# Patient Record
Sex: MALE | Race: WHITE | NOT HISPANIC OR LATINO | Employment: FULL TIME | URBAN - METROPOLITAN AREA
[De-identification: names, ages, dates, MRNs, and addresses within clinical notes are randomized per-mention and may not be internally consistent; named-entity substitution may affect disease eponyms.]

---
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
## 2018-03-17 ENCOUNTER — HOSPITAL ENCOUNTER (EMERGENCY)
Facility: CLINIC | Age: 18
Discharge: HOME | End: 2018-03-17
Attending: PHYSICIAN ASSISTANT | Admitting: PHYSICIAN ASSISTANT
Payer: COMMERCIAL

## 2018-03-17 VITALS
WEIGHT: 150 LBS | RESPIRATION RATE: 18 BRPM | DIASTOLIC BLOOD PRESSURE: 72 MMHG | BODY MASS INDEX: 21.22 KG/M2 | SYSTOLIC BLOOD PRESSURE: 123 MMHG | TEMPERATURE: 97.6 F | OXYGEN SATURATION: 100 %

## 2018-03-17 DIAGNOSIS — R51.9 NONINTRACTABLE HEADACHE, UNSPECIFIED CHRONICITY PATTERN, UNSPECIFIED HEADACHE TYPE: ICD-10-CM

## 2018-03-17 DIAGNOSIS — M25.50 MULTIPLE JOINT PAIN: ICD-10-CM

## 2018-03-17 DIAGNOSIS — B02.9 HERPES ZOSTER WITHOUT COMPLICATION: ICD-10-CM

## 2018-03-17 PROCEDURE — 86666 EHRLICHIA ANTIBODY: CPT | Performed by: PHYSICIAN ASSISTANT

## 2018-03-17 PROCEDURE — 86618 LYME DISEASE ANTIBODY: CPT | Performed by: PHYSICIAN ASSISTANT

## 2018-03-17 PROCEDURE — G0463 HOSPITAL OUTPT CLINIC VISIT: HCPCS

## 2018-03-17 PROCEDURE — 36415 COLL VENOUS BLD VENIPUNCTURE: CPT

## 2018-03-17 PROCEDURE — 99213 OFFICE O/P EST LOW 20 MIN: CPT | Performed by: PHYSICIAN ASSISTANT

## 2018-03-17 RX ORDER — VALACYCLOVIR HYDROCHLORIDE 1 G/1
1000 TABLET, FILM COATED ORAL 3 TIMES DAILY
Qty: 21 TABLET | Refills: 0 | Status: SHIPPED | OUTPATIENT
Start: 2018-03-17 | End: 2018-07-25

## 2018-03-17 NOTE — ED PROVIDER NOTES
Chief Complaint:     Chief Complaint   Patient presents with     Rash     rash on trunk of body with hot/cold sweats       HPI: Carlyle Archibald is a 17 year old male who presents with a rash on the upper back and trunk of body. The rash was first noticed  2 days ago, and has spread. The rash is itchy with a burning prickly sensation.       Associated Symptoms:headache and joint pains.  The headache comes and goes.    He denies any fever.  This is not the worse headache of his life.  No neck pain.  No nausea or vomiting.  No abdominal pain or diarrhea.  No muscle weakness.    ROS:  Further problem focused system review was otherwise negative.     Current Meds  Current Outpatient Prescriptions   Medication Sig Dispense Refill     FLUoxetine (PROZAC) 10 MG capsule Take 1 capsule (10 mg) by mouth 2 times daily 60 capsule 0     Acetaminophen (TYLENOL PO) Take 1,000 mg by mouth every 6 hours as needed for mild pain or fever       adalimumab (HUMIRA) 40 MG/0.8ML prefilled syringe kit Inject 40 mg Subcutaneous every 14 days          Allergies:  No Known Allergies     Immunizations  Immunization History   Administered Date(s) Administered     DTAP (<7y) 2000, 2000, 02/25/1999, 09/04/2003     HEPA 09/08/2006, 08/28/2007     HPV 09/10/2012, 12/16/2014, 03/31/2015     HepB 2000, 2000, 05/28/1999     Hib (PRP-T) 2000, 2000, 02/25/1999     Influenza (IIV3) PF 10/20/1999, 11/29/1999, 10/07/2002, 10/30/2003, 11/06/2006, 11/05/2007, 11/03/2008, 10/27/2011, 09/10/2012     Influenza Vaccine IM 3yrs+ 4 Valent IIV4 12/31/2013, 12/16/2014, 02/15/2016, 11/08/2016     MMR 08/27/1999, 09/04/2003, 03/28/2014     Meningococcal (Menactra ) 08/31/2009, 12/16/2014     Poliovirus, inactivated (IPV) 2000, 2000, 08/27/1999, 09/04/2003     TDAP Vaccine (Boostrix) 08/31/2009     TRIHIBIT (DTAP/HIB, <7y) 11/29/1999     Varicella 08/27/1999     Varicella Pt Report Hx of Varicella/Chicken Pox 10/01/2002            OBJECTIVE:     Vital signs reviewed by Leonides Padilla  /72  Temp 97.6  F (36.4  C) (Temporal)  Resp 18  Wt 68 kg (150 lb)  SpO2 100%  BMI 21.22 kg/m2     PEFR:  General appearance: healthy, alert and no distress  Skin: vesicles and macules on an erythematous base in a dermatomal distribution of the upper chest, side and back on the R side.  He also has a 4cm by 4 cm in diameter area of light erythema with a very mild central lightening in the R axillary area.  Ears: R TM - normal: no effusions, no erythema, and normal landmarks, L TM - normal: no effusions, no erythema, and normal landmarks  Eyes: R normal, L normal  Nose: normal  Oropharynx: normal  Neck: supple and no adenopathy  Lungs: normal and clear to auscultation  Heart: S1, S2 normal, no murmur, click, rub or gallop, regular rate and rhythm  Abdomen: Abdomen soft, non-tender without masses or organomegaly  Neuro: negative findings: speech normal, mental status intact, cranial nerves 2-12 intact, gait, including heel, toe, and tandem walking normal, muscle tone normal, muscle strength normal, rapid alternating movements normal, finger to nose normal, reflexes normal and symmetric      ASSESSMENT:     (B02.9) Herpes zoster without complication  Plan: valACYclovir (VALTREX) 1000 mg tablet    (R51) Nonintractable headache, unspecified chronicity pattern, unspecified headache type    (M25.50) Multiple joint pain       PLAN:    Rx for Valtrex sent in.  With symptoms, I am concerned for tick born illness also.  Lab work for this ordered today.  Instructed patient to follow up with his PCP this week for lab results, and re-evaluation.  Patient verbalized understanding and agreed with this plan.     Leonides Marsh PA-C  03/17/18 [id number]

## 2018-03-17 NOTE — ED AVS SNAPSHOT
[facility] Emergency Department    [address] [address]    Phone:  [phone]    Fax:  [phone]                                       Carlyle Archibald   MRN: [id number]    Department:  [facility] Emergency Department   Date of Visit:  3/17/2018           Patient Information     Date Of Birth          2000        Your diagnoses for this visit were:     Herpes zoster without complication     Nonintractable headache, unspecified chronicity pattern, unspecified headache type     Multiple joint pain        You were seen by Leonides Padilla PA-C.        Discharge Instructions       Please follow up with your regular doctor next week for lab results.      Discharge References/Attachments     SHINGLES (HERPES ZOSTER) (ENGLISH)      24 Hour Appointment Hotline       To make an appointment at any [facility] clinic, call [phone]). If you don't have a family doctor or clinic, we will help you find one. [facility] clinics are conveniently located to serve the needs of you and your family.             Review of your medicines      START taking        Dose / Directions Last dose taken    valACYclovir 1000 mg tablet   Commonly known as:  VALTREX   Dose:  1000 mg   Quantity:  21 tablet        Take 1 tablet (1,000 mg) by mouth 3 times daily for 7 days   Refills:  0          Our records show that you are taking the medicines listed below. If these are incorrect, please call your family doctor or clinic.        Dose / Directions Last dose taken    adalimumab 40 MG/0.8ML prefilled syringe kit   Commonly known as:  HUMIRA   Dose:  40 mg        Inject 40 mg Subcutaneous every 14 days   Refills:  0        FLUoxetine 10 MG capsule   Commonly known as:  PROzac   Dose:  10 mg   Quantity:  60 capsule        Take 1 capsule (10 mg) by mouth 2 times daily   Refills:  0        TYLENOL PO   Dose:  1000 mg        Take 1,000 mg by mouth every 6 hours as needed for mild pain or fever    Refills:  0                Prescriptions were sent or printed at these locations (1 Prescription)                   [facility] - [facility] [place] - [address] [address]   [address] [address] [address]    Telephone:  [phone]   Fax:  [phone]   Hours:                  E-Prescribed (1 of 1)         valACYclovir (VALTREX) 1000 mg tablet                Procedures and tests performed during your visit     Anaplasma phagocytoph antibody IgG IgM    Lyme Disease Marya with reflex to WB Serum      Orders Needing Specimen Collection     None      Pending Results     No orders found from 3/15/2018 to 3/18/2018.            Pending Culture Results     No orders found from 3/15/2018 to 3/18/2018.            Pending Results Instructions     If you had any lab results that were not finalized at the time of your Discharge, you can call the ED Lab Result RN at [phone]. You will be contacted by this team for any positive Lab results or changes in treatment. The nurses are available 7 days a week from 10A to 6:30P.  You can leave a message 24 hours per day and they will return your call.        Test Results From Your Hospital Stay               Thank you for choosing [facility]       Thank you for choosing [facility] for your care. Our goal is always to provide you with excellent care. Hearing back from our patients is one way we can continue to improve our services. Please take a few minutes to complete the written survey that you may receive in the mail after you visit with us. Thank you!        [employer] Information     [employer] gives you secure access to your electronic health record. If you see a primary care provider, you can also send messages to your care team and make appointments. If you have questions, please call your primary care clinic.  If you do not have a primary care provider, please call [phone] and they will assist you.        Care EveryWhere ID     This is your Care EveryWhere ID. This  could be used by other organizations to access your [facility] medical records  [id number]        Equal Access to Services     GARRET SALINAS : Hadii garry Castellano, marvin dawson, tra krishnamurthy. So [facility] [phone].    ATENCIÓN: Si habla español, tiene a rivas disposición servicios gratuitos de asistencia lingüística. Llame al [phone].    We comply with applicable federal civil rights laws and [place] laws. We do not discriminate on the basis of race, color, national origin, age, disability, sex, sexual orientation, or gender identity.            After Visit Summary       This is your record. Keep this with you and show to your community pharmacist(s) and doctor(s) at your next visit.

## 2018-03-17 NOTE — ED AVS SNAPSHOT
[facility] Emergency Department    [address] [address]    Phone:  [phone]    Fax:  [phone]                                       Carlyle Archibald   MRN: [id number]    Department:  [facility] Emergency Department   Date of Visit:  3/17/2018           After Visit Summary Signature Page     I have received my discharge instructions, and my questions have been answered. I have discussed any challenges I see with this plan with the nurse or doctor.    ..........................................................................................................................................  Patient/Patient Representative Signature      ..........................................................................................................................................  Patient Representative Print Name and Relationship to Patient    ..................................................               ................................................  Date                                            Time    ..........................................................................................................................................  Reviewed by Signature/Title    ...................................................              ..............................................  Date                                                            Time

## 2018-03-18 ENCOUNTER — HOSPITAL ENCOUNTER (EMERGENCY)
Facility: CLINIC | Age: 18
Discharge: HOME | End: 2018-03-18
Attending: FAMILY MEDICINE | Admitting: FAMILY MEDICINE
Payer: COMMERCIAL

## 2018-03-18 VITALS
OXYGEN SATURATION: 98 % | SYSTOLIC BLOOD PRESSURE: 131 MMHG | HEIGHT: 73 IN | BODY MASS INDEX: 19.88 KG/M2 | RESPIRATION RATE: 16 BRPM | WEIGHT: 150 LBS | TEMPERATURE: 97.6 F | DIASTOLIC BLOOD PRESSURE: 82 MMHG

## 2018-03-18 DIAGNOSIS — B02.9 HERPES ZOSTER WITHOUT COMPLICATION: ICD-10-CM

## 2018-03-18 LAB — B BURGDOR IGG+IGM SER QL: 0.03 (ref 0–0.89)

## 2018-03-18 PROCEDURE — 99284 EMERGENCY DEPT VISIT MOD MDM: CPT | Mod: Z6 | Performed by: FAMILY MEDICINE

## 2018-03-18 PROCEDURE — 99282 EMERGENCY DEPT VISIT SF MDM: CPT | Performed by: FAMILY MEDICINE

## 2018-03-18 RX ORDER — HYDROCODONE BITARTRATE AND ACETAMINOPHEN 5; 325 MG/1; MG/1
2 TABLET ORAL ONCE
Status: DISCONTINUED | OUTPATIENT
Start: 2018-03-18 | End: 2018-03-18 | Stop reason: HOSPADM

## 2018-03-18 RX ORDER — HYDROCODONE BITARTRATE AND ACETAMINOPHEN 5; 325 MG/1; MG/1
1-2 TABLET ORAL EVERY 4 HOURS PRN
Qty: 30 TABLET | Refills: 0 | Status: SHIPPED | OUTPATIENT
Start: 2018-03-18 | End: 2018-07-25

## 2018-03-18 RX ORDER — HYDROCODONE BITARTRATE AND ACETAMINOPHEN 5; 325 MG/1; MG/1
TABLET ORAL
Status: DISCONTINUED
Start: 2018-03-18 | End: 2018-03-18 | Stop reason: HOSPADM

## 2018-03-18 NOTE — ED AVS SNAPSHOT
[facility] Emergency Department    [address] [address]    Phone:  [phone]    Fax:  [phone]                                       Carlyle Archibald   MRN: [id number]    Department:  [facility] Emergency Department   Date of Visit:  3/18/2018           After Visit Summary Signature Page     I have received my discharge instructions, and my questions have been answered. I have discussed any challenges I see with this plan with the nurse or doctor.    ..........................................................................................................................................  Patient/Patient Representative Signature      ..........................................................................................................................................  Patient Representative Print Name and Relationship to Patient    ..................................................               ................................................  Date                                            Time    ..........................................................................................................................................  Reviewed by Signature/Title    ...................................................              ..............................................  Date                                                            Time

## 2018-03-18 NOTE — ED AVS SNAPSHOT
[facility] Emergency Department    [address] [address]    Phone:  [phone]    Fax:  [phone]                                       Carlyle Archibald   MRN: [id number]    Department:  [facility] Emergency Department   Date of Visit:  3/18/2018           Patient Information     Date Of Birth          2000        Your diagnoses for this visit were:     Herpes zoster without complication        You were seen by Cliff Goff MD.        Discharge Instructions       Do not take your next Humira injection until you talk to her gastroenterologist.  You should probably hold off on this until the shingles is clearly improving.  You need to return to the emergency department if the rash starts to show up in other areas of your body or if you develop fevers, severe headache, or other new concerning symptoms.  Use ibuprofen 400-600 mg up to 4 times per day if needed for pain. Stop if it is causing nausea or abdominal pain.   Add Norco 5-325 (hydrocodone-acetaminophen) 1-2 pills up to every 4 hours if needed for pain.  Try to use this only at night to help with sleep.  Do not use alcohol, operate machinery, drive, or climb on ladders for 8 hours after taking Norco. Use docusate (100mg) 2 times a day to prevent constipation while on narcotics.      24 Hour Appointment Hotline       To make an appointment at any [facility] clinic, call [phone]). If you don't have a family doctor or clinic, we will help you find one. [facility] clinics are conveniently located to serve the needs of you and your family.             Review of your medicines      START taking        Dose / Directions Last dose taken    HYDROcodone-acetaminophen 5-325 MG per tablet   Commonly known as:  NORCO   Dose:  1-2 tablet   Quantity:  30 tablet        Take 1-2 tablets by mouth every 4 hours as needed for moderate to severe pain   Refills:  0          Our records show that you are taking the  medicines listed below. If these are incorrect, please call your family doctor or clinic.        Dose / Directions Last dose taken    adalimumab 40 MG/0.8ML prefilled syringe kit   Commonly known as:  HUMIRA   Dose:  40 mg        Inject 40 mg Subcutaneous every 14 days   Refills:  0        TYLENOL PO   Dose:  1000 mg        Take 1,000 mg by mouth every 6 hours as needed for mild pain or fever   Refills:  0        valACYclovir 1000 mg tablet   Commonly known as:  VALTREX   Dose:  1000 mg   Quantity:  21 tablet        Take 1 tablet (1,000 mg) by mouth 3 times daily for 7 days   Refills:  0                Prescriptions were sent or printed at these locations (1 Prescription)                   Other Prescriptions                Printed at Department/Unit printer (1 of 1)         HYDROcodone-acetaminophen (NORCO) 5-325 MG per tablet                Orders Needing Specimen Collection     None      Pending Results     Date and Time Order Name Status Description    3/17/2018 1306 Anaplasma phagocytoph antibody IgG IgM In process     3/17/2018 1306 Lyme Disease Marya with reflex to WB Serum In process             Pending Culture Results     No orders found from 3/16/2018 to 3/19/2018.            Pending Results Instructions     If you had any lab results that were not finalized at the time of your Discharge, you can call the ED Lab Result RN at [phone]. You will be contacted by this team for any positive Lab results or changes in treatment. The nurses are available 7 days a week from 10A to 6:30P.  You can leave a message 24 hours per day and they will return your call.        Test Results From Your Hospital Stay               Thank you for choosing [facility]       Thank you for choosing [facility] for your care. Our goal is always to provide you with excellent care. Hearing back from our patients is one way we can continue to improve our services. Please take a few minutes to complete the written survey that you may receive  in the mail after you visit with us. Thank you!        [employer] Information     [employer] gives you secure access to your electronic health record. If you see a primary care provider, you can also send messages to your care team and make appointments. If you have questions, please call your primary care clinic.  If you do not have a primary care provider, please call [phone] and they will assist you.        Care EveryWhere ID     This is your Care EveryWhere ID. This could be used by other organizations to access your [facility] medical records  [id number]        Equal Access to Services     GARRET [facility] : Asia Castellano, marvin dawson, nathan oliver, tra garcia . So [facility] [phone].    ATENCIÓN: Si habla español, tiene a rivas disposición servicios gratuitos de asistencia lingüística. Llame al [phone].    We comply with applicable federal civil rights laws and [place] laws. We do not discriminate on the basis of race, color, national origin, age, disability, sex, sexual orientation, or gender identity.            After Visit Summary       This is your record. Keep this with you and show to your community pharmacist(s) and doctor(s) at your next visit.

## 2018-03-18 NOTE — DISCHARGE INSTRUCTIONS
Do not take your next Humira injection until you talk to her gastroenterologist.  You should probably hold off on this until the shingles is clearly improving.  You need to return to the emergency department if the rash starts to show up in other areas of your body or if you develop fevers, severe headache, or other new concerning symptoms.  Use ibuprofen 400-600 mg up to 4 times per day if needed for pain. Stop if it is causing nausea or abdominal pain.   Add Norco 5-325 (hydrocodone-acetaminophen) 1-2 pills up to every 4 hours if needed for pain.  Try to use this only at night to help with sleep.  Do not use alcohol, operate machinery, drive, or climb on ladders for 8 hours after taking Norco. Use docusate (100mg) 2 times a day to prevent constipation while on narcotics.

## 2018-03-18 NOTE — ED PROVIDER NOTES
History     Chief Complaint   Patient presents with     Shingles     Rt thorax and Rt back     HPI  Carlyle Archibald is a 17 year old male who presents with a rash on the right side of his trunk.  He was seen in urgent care today and diagnosed with shingles.  He comes into now because he has pain with his ability to sleep.  He is treated with Humira for Crohn's disease.  He is due for his next injection on Tuesday in 3 days.  He is not having any new lesions develop outside of the original area of involvement and denies headache, fever, mental confusion, or other new concerning symptoms.    Problem List:    Patient Active Problem List    Diagnosis Date Noted     Anxiety 03/27/2017     Priority: Medium     Substance abuse 11/08/2016     Priority: Medium     Major depressive disorder, single episode, mild (H) 11/08/2016     Priority: Medium     Perianal abscess 02/04/2014     Priority: Medium     Crohn's disease (H) 01/25/2014     Priority: Medium     - Diagnosed: 1/2014, at age: 13 years     - Location (visual):    Ileocolonic (L3)                                                                 Unknown (EGD was not performed):                                         Upper GI disease proximal to ligament of Treitz (L4a)                               Upper GI disease distal to ligament of Treitz  and proximal to distal 1/3 ileum (L4b)    - Extent (histopath): TI, Entire Colon    - Behavior:     Non-stricturing, non-penetrating (B1)                            Perianal (p)    - Growth:       No evidence of growth delay (G0)                            - TPMT phenotype:     Normal 27.1  [id number]  - IBD7:                            Not done    - PPD status:      2/2014: pending    - Extraintestinal manifestations: None (erythema nodosum, pyoderma gangrenosum, arthritis, arthralgia, fevers, PSC, others.)    - Previous IBD-related medications and reasons for their discontinuation:    - Immunization status: Fully Immunized  for age  - Varicella, measles, mumps IgG status: Unknown, Positive titers: 2/2014: pending    - Eye exam:                date and result    - Previous abdominal surgeries: dates  - Previous admissions for IBD: dates       GERD (gastroesophageal reflux disease) 01/07/2014     Priority: Medium     Osgood-Schlatter's disease 09/10/2012     Priority: Medium     Left knee       Personal history of disease 09/08/2006     Priority: Medium     Problem list name updated by automated process. Provider to review          Past Medical History:    Past Medical History:   Diagnosis Date     Closed fracture of unspecified part of humerus 8/2005     GN IgG mesangial       Meningitis, unspecified(322.9)        Past Surgical History:    Past Surgical History:   Procedure Laterality Date     COLONOSCOPY  1/17/2014    Procedure: COMBINED COLONOSCOPY, SINGLE BIOPSY/POLYPECTOMY BY BIOPSY;  Colonoscopy;  Surgeon: Una Alcazar MD;  Location: [facility]     TONSILLECTOMY & ADENOIDECTOMY  about 1 1/3 yo       Family History:    Family History   Problem Relation Age of Onset     Allergies Mother      Lipids Mother      Allergies Father      GASTROINTESTINAL DISEASE Father      Crohn's disease     Lipids Maternal Grandmother      CANCER Maternal Grandmother      Lipids Maternal Grandfather      CANCER Maternal Grandfather      DIABETES Paternal Grandfather      CANCER Paternal Grandmother      Anxiety Disorder Sister        Social History:  Marital Status:  Single [1]  Social History   Substance Use Topics     Smoking status: Never Smoker     Smokeless tobacco: Never Used     Alcohol use No        Medications:      HYDROcodone-acetaminophen (NORCO) 5-325 MG per tablet   valACYclovir (VALTREX) 1000 mg tablet   adalimumab (HUMIRA) 40 MG/0.8ML prefilled syringe kit   Acetaminophen (TYLENOL PO)         Review of Systems  Further problem focused system review negative.    Physical Exam   BP: 131/82  Heart Rate: 64  Temp: 97.6  F (36.4  " C)  Resp: 16  Height: 185.4 cm (6' 1\")  Weight: 68 kg (150 lb)  SpO2: 98 %      Physical Exam  Nursing note and vitals were reviewed.  Constitutional: Awake and alert, adequately nourished and developed appearing 17-year-old in moderate discomfort, who does not appear acutely ill, and who answers questions appropriately and cooperates with examination.    Cardiovascular: Cardiac examination reveals normal heart rate and regular rhythm without murmur.  Pulmonary/Chest: Breathing is unlabored.    Musculoskeletal: Extremities are warm and well-perfused and without edema  Neurological: Alert, oriented, thought content logical, coherent   Skin: Warm, dry, there is a vesicular rash in a dermatomal distribution on the right trunk which was photographed as below.  This is typical of zoster.  No other lesions outside of this area.  Psychiatric: Affect broad and appropriate.                  ED Course     ED Course     Procedures               Critical Care time:  none               No results found for this or any previous visit (from the past 24 hour(s)).    Medications - No data to display    Assessments & Plan (with Medical Decision Making)     17-year-old male presents with shingles.  Symptoms have been present for 3 days.  He was seen earlier today and prior prescribed Valtrex which he is taking.  He reports returns because of poor pain control.  I have advised use of a combination of acetaminophen, ibuprofen, and hydrocodone for breakthrough pain.  I also discussed with him my concern for his use of Humira with this infection that he could develop disseminated zoster.  I recommended he not take his next injection and call and speak to his gastroenterologist.  I think he should avoid using this until he is clearly showing evidence of recovery.  I also discussed with him that if he starts to show evidence of lesions showing up in other areas of his body or systemic illness he needs to return to the emergency department " immediately.    I have reviewed the nursing notes.    I have reviewed the findings, diagnosis, plan and need for follow up with the patient.       Discharge Medication List as of 3/18/2018 12:58 AM      START taking these medications    Details   HYDROcodone-acetaminophen (NORCO) 5-325 MG per tablet Take 1-2 tablets by mouth every 4 hours as needed for moderate to severe pain, Disp-30 tablet, R-0, Local Print             Final diagnoses:   Herpes zoster without complication       3/18/2018   [facility] EMERGENCY DEPARTMENT     Cliff Goff MD  03/18/18 1716

## 2018-03-18 NOTE — ED NOTES
Pt states that he dwveloped  Blisters on his rt chest and rt mid back area 2 days a ago and it has increased in size and pain since then. He has had chicken pox. He also has Crohns disease which he is on Humira for.

## 2018-03-20 LAB
A PHAGOCYTOPH IGG TITR SER IF: NORMAL {TITER}
A PHAGOCYTOPH IGM TITR SER IF: NORMAL {TITER}

## 2018-06-15 ENCOUNTER — TRANSFERRED RECORDS (OUTPATIENT)
Dept: HEALTH INFORMATION MANAGEMENT | Facility: CLINIC | Age: 18
End: 2018-06-15

## 2018-07-25 ENCOUNTER — OFFICE VISIT (OUTPATIENT)
Dept: FAMILY MEDICINE | Facility: CLINIC | Age: 18
End: 2018-07-25
Payer: COMMERCIAL

## 2018-07-25 VITALS
HEIGHT: 73 IN | HEART RATE: 72 BPM | SYSTOLIC BLOOD PRESSURE: 110 MMHG | BODY MASS INDEX: 18.82 KG/M2 | OXYGEN SATURATION: 99 % | WEIGHT: 142 LBS | DIASTOLIC BLOOD PRESSURE: 78 MMHG | TEMPERATURE: 97 F

## 2018-07-25 DIAGNOSIS — K50.918 CROHN'S DISEASE WITH OTHER COMPLICATION, UNSPECIFIED GASTROINTESTINAL TRACT LOCATION (H): Primary | ICD-10-CM

## 2018-07-25 DIAGNOSIS — F19.10 SUBSTANCE ABUSE (H): ICD-10-CM

## 2018-07-25 DIAGNOSIS — F41.1 GAD (GENERALIZED ANXIETY DISORDER): ICD-10-CM

## 2018-07-25 DIAGNOSIS — F32.0 MAJOR DEPRESSIVE DISORDER, SINGLE EPISODE, MILD (H): ICD-10-CM

## 2018-07-25 PROCEDURE — 99215 OFFICE O/P EST HI 40 MIN: CPT | Performed by: NURSE PRACTITIONER

## 2018-07-25 ASSESSMENT — ANXIETY QUESTIONNAIRES
7. FEELING AFRAID AS IF SOMETHING AWFUL MIGHT HAPPEN: NEARLY EVERY DAY
IF YOU CHECKED OFF ANY PROBLEMS ON THIS QUESTIONNAIRE, HOW DIFFICULT HAVE THESE PROBLEMS MADE IT FOR YOU TO DO YOUR WORK, TAKE CARE OF THINGS AT HOME, OR GET ALONG WITH OTHER PEOPLE: VERY DIFFICULT
2. NOT BEING ABLE TO STOP OR CONTROL WORRYING: NEARLY EVERY DAY
3. WORRYING TOO MUCH ABOUT DIFFERENT THINGS: NEARLY EVERY DAY
5. BEING SO RESTLESS THAT IT IS HARD TO SIT STILL: SEVERAL DAYS
6. BECOMING EASILY ANNOYED OR IRRITABLE: NEARLY EVERY DAY
GAD7 TOTAL SCORE: 17
1. FEELING NERVOUS, ANXIOUS, OR ON EDGE: MORE THAN HALF THE DAYS

## 2018-07-25 ASSESSMENT — PATIENT HEALTH QUESTIONNAIRE - PHQ9: 5. POOR APPETITE OR OVEREATING: MORE THAN HALF THE DAYS

## 2018-07-25 NOTE — MR AVS SNAPSHOT
After Visit Summary   7/25/2018    Carlyle Archibald    MRN: [id number]           Patient Information     Date Of Birth          2000        Visit Information        Provider Department      7/25/2018 8:40 AM Smiley Rivera NP [facility]        Today's Diagnoses     Crohn's disease with other complication, unspecified gastrointestinal tract location (H)    -  1    Major depressive disorder, single episode, mild (H)        Substance abuse        CLARITZA (generalized anxiety disorder)          Care Instructions    Possible side effects of antidepressants/anxiety meds, including but not limited to GI upset, disrupted sleep, loss of libido, worsening of mood or even possible risk of increased suicidal thoughts.   Often some of these things if not severe will improve after 1-2 weeks on medications but some may not see effects for 3-4 weeks,  if tolerable patients should continue meds and see if there is improvement.  If symptoms are intolerable or for any suicidal thoughts the medication should be stopped immediately and contact the clinic.      These medications should be used for 6-9 months before stopping, to avoid rebound symptoms.   Contact the clinic if having any problems tolerating these medications.  Take the medication daily and do not stop the medication abruptly.    Follow up in 2-3 weeks either e-visit, telephone visit or in clinic to discuss improvement and whether or not we need to change meds or increase dose.  Follow up sooner if problems.    Referral to Psychiatry - they will call you to make appointment.    Please plan to contact the clinic or 24 hour nurse line at any time if you are having any thoughts of self harm.    GUNNAR Reilly              Follow-ups after your visit        Additional Services     MENTAL HEALTH REFERRAL  - Adult; Psychiatry and Medication Management; Psychiatry; [facility] [place]: Collaborative Care Psychiatry Service [phone].  Medication  management & future refills will be returned to [facility] PCP upon completion of evaluation; We jose...       All scheduling is subject to the client's specific insurance plan & benefits, provider/location availability, and provider clinical specialities.  Please arrive 15 minutes early for your first appointment and bring your completed paperwork.    Please be aware that coverage of these services is subject to the terms and limitations of your health insurance plan.  Call member services at your health plan with any benefit or coverage questions.                            Who to contact     If you have questions or need follow up information about today's clinic visit or your schedule please contact [facility] directly at [phone].  Normal or non-critical lab and imaging results will be communicated to you by MyChart, letter or phone within 4 business days after the clinic has received the results. If you do not hear from us within 7 days, please contact the clinic through [employer] or phone. If you have a critical or abnormal lab result, we will notify you by phone as soon as possible.  Submit refill requests through [employer] or call your pharmacy and they will forward the refill request to us. Please allow 3 business days for your refill to be completed.          Additional Information About Your Visit        [employer] Client Match Information     [employer] gives you secure access to your electronic health record. If you see a primary care provider, you can also send messages to your care team and make appointments. If you have questions, please call your primary care clinic.  If you do not have a primary care provider, please call [phone] and they will assist you.        Care EveryWhere ID     This is your Care EveryWhere ID. This could be used by other organizations to access your [facility] medical records  [id number]        Your Vitals Were     Pulse Temperature Height Pulse Oximetry BMI (Body Mass Index)        "72 97  F (36.1  C) (Tympanic) 6' 1\" (1.854 m) 99% 18.73 kg/m2        Blood Pressure from Last 3 Encounters:   07/25/18 110/78   03/18/18 131/82   03/17/18 123/72    Weight from Last 3 Encounters:   07/25/18 142 lb (64.4 kg) (28 %)*   03/18/18 150 lb (68 kg) (43 %)*   03/17/18 150 lb (68 kg) (43 %)*     * Growth percentiles are based on [facility] 2-20 Years data.              We Performed the Following     MENTAL HEALTH REFERRAL  - Adult; Psychiatry and Medication Management; Psychiatry; [facility] [place]: [facility] Psychiatry Service [phone].  Medication management & future refills will be returned to [facility] PCP upon completion of evaluation; We jose...          Today's Medication Changes          These changes are accurate as of 7/25/18  9:29 AM.  If you have any questions, ask your nurse or doctor.               Start taking these medicines.        Dose/Directions    FLUoxetine 20 MG capsule   Commonly known as:  PROzac   Used for:  CLARITZA (generalized anxiety disorder), Substance abuse, Major depressive disorder, single episode, mild (H)   Started by:  Smiley Rivera NP        Dose:  20 mg   Take 1 capsule (20 mg) by mouth daily   Quantity:  30 capsule   Refills:  1            Where to get your medicines      These medications were sent to [facility] [place] - [address] [place] [address]     Phone:  [phone]     FLUoxetine 20 MG capsule                Primary Care Provider Office Phone # Fax #    Smiley Rivera [phone] [address] [facility] [address]        Equal Access to Services     GARRET SALINAS AH: Hadii garry Castellano, waaxda luqadaha, qaybta kaalmada adeegyada, tra duran. So [facility] [phone].    ATENCIÓN: Si habla español, tiene a rivas disposición servicios gratuitos de asistencia lingüística. Llame al [phone].    We comply with applicable federal civil rights laws and " [place] laws. We do not discriminate on the basis of race, color, national origin, age, disability, sex, sexual orientation, or gender identity.            Thank you!     Thank you for choosing [facility]  for your care. Our goal is always to provide you with excellent care. Hearing back from our patients is one way we can continue to improve our services. Please take a few minutes to complete the written survey that you may receive in the mail after your visit with us. Thank you!             Your Updated Medication List - Protect others around you: Learn how to safely use, store and throw away your medicines at www.disposemymeds.org.          This list is accurate as of 7/25/18  9:29 AM.  Always use your most recent med list.                   Brand Name Dispense Instructions for use Diagnosis    adalimumab 40 MG/0.8ML prefilled syringe kit    HUMIRA     Inject 40 mg Subcutaneous every 14 days        FLUoxetine 20 MG capsule    PROzac    30 capsule    Take 1 capsule (20 mg) by mouth daily    CLARITZA (generalized anxiety disorder), Substance abuse, Major depressive disorder, single episode, mild (H)       TYLENOL PO      Take 1,000 mg by mouth every 6 hours as needed for mild pain or fever

## 2018-07-25 NOTE — PATIENT INSTRUCTIONS
Possible side effects of antidepressants/anxiety meds, including but not limited to GI upset, disrupted sleep, loss of libido, worsening of mood or even possible risk of increased suicidal thoughts.   Often some of these things if not severe will improve after 1-2 weeks on medications but some may not see effects for 3-4 weeks,  if tolerable patients should continue meds and see if there is improvement.  If symptoms are intolerable or for any suicidal thoughts the medication should be stopped immediately and contact the clinic.      These medications should be used for 6-9 months before stopping, to avoid rebound symptoms.   Contact the clinic if having any problems tolerating these medications.  Take the medication daily and do not stop the medication abruptly.    Follow up in 2-3 weeks either e-visit, telephone visit or in clinic to discuss improvement and whether or not we need to change meds or increase dose.  Follow up sooner if problems.    Referral to Psychiatry - they will call you to make appointment.    Please plan to contact the clinic or 24 hour nurse line at any time if you are having any thoughts of self harm.    GUNNAR Reilly

## 2018-07-25 NOTE — PROGRESS NOTES
SUBJECTIVE:   Carlyle Archibald is a 17 year old male who presents to clinic today for the following health issues:    Depression and Anxiety Follow-Up    Status since last visit: Worsened - he was taking prozac but has been off the medication for a while. He is also wanting to discuss bipolar. Pt states he has really good days where he feel confident and happy and then really bad days where he feels down.     Stopped 5 months ago. Never felt like the medication was helpful.    Other associated symptoms:None    Complicating factors:     Significant life event: Yes-  Stressors with school.      Current substance abuse: None    Reports sudden changes with moods, irritability, anger issues.      History of Crohn's - diagnosed in adolescence.  Has tried Fluoxetine and Sertraline in the past.    Family history - dad, sister with anxiety disorder.    PHQ-9 11/8/2016 2/23/2017 3/21/2017   Total Score 13 12 14   Q9: Suicide Ideation Not at all Not at all Several days     CLARITZA-7 SCORE 11/8/2016 2/23/2017 3/21/2017   Total Score - - -   Total Score 8 9 16     In the past two weeks have you had thoughts of suicide or self-harm?  Yes but not with plan, fleeting thoughts .    Do you have concerns about your personal safety or the safety of others?   No  PHQ-9  English  PHQ-9   Any Language  CLARITZA-7  Suicide Assessment Five-step Evaluation and Treatment (SAFE-T)    Amount of exercise or physical activity: None    Problems taking medications regularly: No    Medication side effects: none    Diet: regular (no restrictions)      Problem list and histories reviewed & adjusted, as indicated.  Additional history: as documented    Patient Active Problem List   Diagnosis     Personal history of disease     Osgood-Schlatter's disease     GERD (gastroesophageal reflux disease)     Crohn's disease (H)     Perianal abscess     Substance abuse     Major depressive disorder, single episode, mild (H)     Anxiety     Past Surgical History:    Procedure Laterality Date     COLONOSCOPY  1/17/2014    Procedure: COMBINED COLONOSCOPY, SINGLE BIOPSY/POLYPECTOMY BY BIOPSY;  Colonoscopy;  Surgeon: Una Alcazar MD;  Location: [facility]     TONSILLECTOMY & ADENOIDECTOMY  about 1 1/3 yo       Social History   Substance Use Topics     Smoking status: Never Smoker     Smokeless tobacco: Never Used     Alcohol use No     Family History   Problem Relation Age of Onset     Allergies Mother      Lipids Mother      Allergies Father      GASTROINTESTINAL DISEASE Father      Crohn's disease     Lipids Maternal Grandmother      Cancer Maternal Grandmother      Lipids Maternal Grandfather      Cancer Maternal Grandfather      Diabetes Paternal Grandfather      Cancer Paternal Grandmother      Anxiety Disorder Sister          Current Outpatient Prescriptions   Medication Sig Dispense Refill     Acetaminophen (TYLENOL PO) Take 1,000 mg by mouth every 6 hours as needed for mild pain or fever       adalimumab (HUMIRA) 40 MG/0.8ML prefilled syringe kit Inject 40 mg Subcutaneous every 14 days       No Known Allergies  Recent Labs   Lab Test  04/11/16   1720 02/15/16  01/10/16   2045   02/04/14   1051   ALT  27  53  46   < >  33   CR  0.87  0.99  0.95   < >  0.92   GFRESTIMATED  >90  Non  GFR Calc     --   >90  Non  GFR Calc     < >  GFR not calculated, patient <16 years old.   GFRESTBLACK  >90   GFR Calc     --   >90   GFR Calc     < >  GFR not calculated, patient <16 years old.   POTASSIUM  3.6  3.9  3.4   < >  4.7   TSH   --    --    --    --   1.78    < > = values in this interval not displayed.      BP Readings from Last 3 Encounters:   07/25/18 110/78   03/18/18 131/82   03/17/18 123/72    Wt Readings from Last 3 Encounters:   07/25/18 142 lb (64.4 kg) (28 %)*   03/18/18 150 lb (68 kg) (43 %)*   03/17/18 150 lb (68 kg) (43 %)*     * Growth percentiles are based on [facility] 2-20 Years data.                 "  Labs reviewed in EPIC    Reviewed and updated as needed this visit by clinical staff       Reviewed and updated as needed this visit by Provider         ROS:  Constitutional, HEENT, cardiovascular, pulmonary, GI, , musculoskeletal, neuro, skin, endocrine and psych systems are negative, except as otherwise noted.    OBJECTIVE:     /78  Pulse 72  Temp 97  F (36.1  C) (Tympanic)  Ht 6' 1\" (1.854 m)  Wt 142 lb (64.4 kg)  SpO2 99%  BMI 18.73 kg/m2  Body mass index is 18.73 kg/(m^2).  GENERAL: healthy, alert and no distress  PSYCH: mentation appears normal, affect normal/bright    Diagnostic Test Results:  none     ASSESSMENT/PLAN:     1. Major depressive disorder, single episode, mild (H)   The risks, benefits and treatment options of prescribed medications or other treatments have been discussed with the patient. The patient verbalized their understanding and should call or follow up if no improvement or if they develop further problems.    Discussed maintenance medications.  Mood disorder questionairre completed today - positive  Referral for Psychiatry - if not improving on serotonin specific reuptake inhibitor alone would add mood stabilizer     - FLUoxetine (PROZAC) 20 MG capsule; Take 1 capsule (20 mg) by mouth daily  Dispense: 30 capsule; Refill: 1  - MENTAL HEALTH REFERRAL  - Adult; Psychiatry and Medication Management; Psychiatry; [facility]: [facility] Psychiatry Service [phone].  Medication management & future refills will be returned to [facility] PCP upon completion of evaluation; We jose...    2. Substance abuse  Denies today.    - FLUoxetine (PROZAC) 20 MG capsule; Take 1 capsule (20 mg) by mouth daily  Dispense: 30 capsule; Refill: 1  - MENTAL HEALTH REFERRAL  - Adult; Psychiatry and Medication Management; Psychiatry; [facility]: [facility] Psychiatry Service [phone].  Medication management & future refills will be returned to [facility] PCP upon completion of evaluation; We jose...    3. " CLARITZA (generalized anxiety disorder)     - FLUoxetine (PROZAC) 20 MG capsule; Take 1 capsule (20 mg) by mouth daily  Dispense: 30 capsule; Refill: 1  - MENTAL HEALTH REFERRAL  - Adult; Psychiatry and Medication Management; Psychiatry; [facility] [facility] Psychiatry Service [phone].  Medication management & future refills will be returned to [facility] [place]CP upon completion of evaluation; We jose...    4. Crohn's disease with other complication, unspecified gastrointestinal tract location (H)   Stable - on Humira - no recent flares.      Patient Instructions   Possible side effects of antidepressants/anxiety meds, including but not limited to GI upset, disrupted sleep, loss of libido, worsening of mood or even possible risk of increased suicidal thoughts.   Often some of these things if not severe will improve after 1-2 weeks on medications but some may not see effects for 3-4 weeks,  if tolerable patients should continue meds and see if there is improvement.  If symptoms are intolerable or for any suicidal thoughts the medication should be stopped immediately and contact the clinic.      These medications should be used for 6-9 months before stopping, to avoid rebound symptoms.   Contact the clinic if having any problems tolerating these medications.  Take the medication daily and do not stop the medication abruptly.    Follow up in 2-3 weeks either e-visit, telephone visit or in clinic to discuss improvement and whether or not we need to change meds or increase dose.  Follow up sooner if problems.    Referral to Psychiatry - they will call you to make appointment.    Please plan to contact the clinic or 24 hour nurse line at any time if you are having any thoughts of self harm.    GUNNAR Reilly NP  [facility]      Total times spent with patient 40 minutes of which > 50% of the time was spent counseling and coordination of care discussed anxiety, depression, mood  disorder, medications, SE, recommendations, treatment plan, follow up and referral.

## 2018-07-25 NOTE — NURSING NOTE
"Initial /78  Pulse 72  Temp 97  F (36.1  C) (Tympanic)  Ht 6' 1\" (1.854 m)  Wt 142 lb (64.4 kg)  SpO2 99%  BMI 18.73 kg/m2 Estimated body mass index is 18.73 kg/(m^2) as calculated from the following:    Height as of this encounter: 6' 1\" (1.854 m).    Weight as of this encounter: 142 lb (64.4 kg). .    Yulia Boyd CMA ([facility])  "

## 2018-07-26 ASSESSMENT — PATIENT HEALTH QUESTIONNAIRE - PHQ9: SUM OF ALL RESPONSES TO PHQ QUESTIONS 1-9: 21

## 2018-07-26 ASSESSMENT — ANXIETY QUESTIONNAIRES: GAD7 TOTAL SCORE: 17

## 2018-08-27 ENCOUNTER — OFFICE VISIT (OUTPATIENT)
Dept: FAMILY MEDICINE | Facility: CLINIC | Age: 18
End: 2018-08-27
Payer: COMMERCIAL

## 2018-08-27 VITALS
BODY MASS INDEX: 18.59 KG/M2 | SYSTOLIC BLOOD PRESSURE: 112 MMHG | HEIGHT: 73 IN | DIASTOLIC BLOOD PRESSURE: 68 MMHG | TEMPERATURE: 97.5 F | WEIGHT: 140.3 LBS | OXYGEN SATURATION: 97 % | HEART RATE: 86 BPM

## 2018-08-27 DIAGNOSIS — F41.1 GAD (GENERALIZED ANXIETY DISORDER): ICD-10-CM

## 2018-08-27 DIAGNOSIS — F32.0 MAJOR DEPRESSIVE DISORDER, SINGLE EPISODE, MILD (H): ICD-10-CM

## 2018-08-27 DIAGNOSIS — F19.10 SUBSTANCE ABUSE (H): ICD-10-CM

## 2018-08-27 PROCEDURE — 99214 OFFICE O/P EST MOD 30 MIN: CPT | Performed by: NURSE PRACTITIONER

## 2018-08-27 NOTE — NURSING NOTE
"Initial /68 (BP Location: Right arm, Patient Position: Chair, Cuff Size: Adult Regular)  Pulse 86  Temp 97.5  F (36.4  C) (Tympanic)  Ht 6' 1\" (1.854 m)  Wt 140 lb 4.8 oz (63.6 kg)  SpO2 97%  BMI 18.51 kg/m2 Estimated body mass index is 18.51 kg/(m^2) as calculated from the following:    Height as of this encounter: 6' 1\" (1.854 m).    Weight as of this encounter: 140 lb 4.8 oz (63.6 kg). .    Yulia Boyd CMA ([facility])  "

## 2018-08-27 NOTE — MR AVS SNAPSHOT
"              After Visit Summary   8/27/2018    Carlyle Archibald    MRN: [id number]           Patient Information     Date Of Birth          2000        Visit Information        Provider Department      8/27/2018 2:20 PM Smiley Rivera NP [facility]        Today's Diagnoses     Major depressive disorder, single episode, mild (H)        CLARITZA (generalized anxiety disorder)        Substance abuse          Care Instructions    Discussed increasing dose to 40 mg (2 X 20 mg) once daily and monitor symptoms over the next several weeks.  If you are still experiencing some \"wear off\" or worsening moods in the evenings - then could split dosing by taking 20 mg in the am and 20 mg at dinnertime.    Monitor sleep concerns.  And follow up if this gets worse or does not improve.  Short term can try over the counter Melatonin 2-5 mg at bedtime - take 1 hour prior to bedtime.    General recommendations for sleep problems (Insomnia)  Allow 2-4 weeks to see results   Establish a regular sleep schedule   Go to bed at same time each night   Get up at same time each day   Avoid sleeping-in on Sunday morning   Cut down time in bed (if not asleep, get up)   Avoid trying to force yourself to sleep   Use your bed only for sleep and sex   Do not read or watch Television in bed   Make the bedroom comfortable   Keep temperature in your bedroom comfortable   Keep bedroom quiet when sleeping   Consider ear plugs (silicon)   Keep bedroom dark enough   Use dark blinds or wear an eye mask if needed   Relax at bedtime   Relax each muscle group individually   Begin with your feet   Work toward your head   Deal with your worries before bedtime   Set aside a worry time for 30 minutes earlier   Listen to relaxation tapes   Classical Music or Nature sounds   Imagine a tranquil scene (e.g. waterfall or beach)   Back Massage   Gentle 5-minute back rub prior to bedtime   Perform measures to make you tired at bedtime   Get regular " Exercise each day (6 hours before bedtime)   Take medications only as directed   Eat a light bedtime snack or warm drink   Warm milk   Warm herbal tea (non-caffeinated)   Special Therapy Measures   Sleep Restriction Therapy   Limit time in bed for 15 minutes more than sleep   Do not set limit under 4 hours   Increase time by 15 minutes per effective sleep trial   Effective sleep suggests >90% of bedtime asleep   Stimulus Control   Do not lie awake for more than 30 minutes   Get out of bed and perform a quiet activity   Return to bed when sleepy   Repeat as many times per night as needed   No Napping during day   Things to avoid   Do not Exercise just before bedtime   No overstimulating activities just before bed   No competitive games before bedtime   No exciting Television programs before bedtime   Avoid caffeine after lunchtime   Avoid chocolate   Avoid coffee, tea, or soda   Do not use alcohol to induce sleep (worsens Insomnia)   Do not take someone else's sleeping pills   Do not look at the clock when awakening   Do not turn on light when getting up to use bathroom   Read the book Say God Night To Insomnia    GUNNAR Reilly            Follow-ups after your visit        Your next 10 appointments already scheduled     Sep 26, 2018  3:00 PM CDT   New Visit with Giovanni Rojas MD   [facility] ([facility])    [address] [facility] [address] [phone]              Who to contact     If you have questions or need follow up information about today's clinic visit or your schedule please contact [facility] directly at [phone].  Normal or non-critical lab and imaging results will be communicated to you by MyChart, letter or phone within 4 business days after the clinic has received the results. If you do not hear from us within 7 days, please contact the clinic through MyChart or phone. If you have a critical or  "abnormal lab result, we will notify you by phone as soon as possible.  Submit refill requests through [employer] or call your pharmacy and they will forward the refill request to us. Please allow 3 business days for your refill to be completed.          Additional Information About Your Visit        [facility] Information     [employer] gives you secure access to your electronic health record. If you see a primary care provider, you can also send messages to your care team and make appointments. If you have questions, please call your primary care clinic.  If you do not have a primary care provider, please call [phone] and they will assist you.        Care EveryWhere ID     This is your Care EveryWhere ID. This could be used by other organizations to access your [place] medical records  [id number]        Your Vitals Were     Pulse Temperature Height Pulse Oximetry BMI (Body Mass Index)       86 97.5  F (36.4  C) (Tympanic) 6' 1\" (1.854 m) 97% 18.51 kg/m2        Blood Pressure from Last 3 Encounters:   08/27/18 112/68   07/25/18 110/78   03/18/18 131/82    Weight from Last 3 Encounters:   08/27/18 140 lb 4.8 oz (63.6 kg)   07/25/18 142 lb (64.4 kg) (28 %)*   03/18/18 150 lb (68 kg) (43 %)*     * Growth percentiles are based on [facility] 2-20 Years data.              We Performed the Following     DEPRESSION ACTION PLAN (DAP)          Today's Medication Changes          These changes are accurate as of 8/27/18  2:47 PM.  If you have any questions, ask your nurse or doctor.               These medicines have changed or have updated prescriptions.        Dose/Directions    FLUoxetine 20 MG capsule   Commonly known as:  PROzac   This may have changed:  how much to take   Used for:  CLARITZA (generalized anxiety disorder), Substance abuse, Major depressive disorder, single episode, mild (H)   Changed by:  Smiley Rivera, NP        Dose:  40 mg   Take 2 capsules (40 mg) by mouth daily   Quantity:  180 capsule   Refills:  1    "         Where to get your medicines      [place] [facility] [place] [address] [facility]  [address] [facility] [address]     Phone:  [phone]     FLUoxetine 20 MG capsule                Primary Care Provider Office Phone # Fax #    Smiley Rivera [phone] [address] [facility] [address]        Equal Access to Services     GARRET SALINAS : Hadii aad ku hadasho Soomaali, waaxda luqadaha, qaybta kaalmada adeegyada, waxay idiin hayaan adeeg kharash laaguila . So [facility] [phone].    ATENCIÓN: Si habla español, tiene a rivas disposición servicios gratuitos de asistencia lingüística. Naomiame al [phone].    We comply with applicable federal civil rights laws and [place] laws. We do not discriminate on the basis of race, color, national origin, age, disability, sex, sexual orientation, or gender identity.            Thank you!     Thank you for choosing [facility]  for your care. Our goal is always to provide you with excellent care. Hearing back from our patients is one way we can continue to improve our services. Please take a few minutes to complete the written survey that you may receive in the mail after your visit with us. Thank you!             Your Updated Medication List - Protect others around you: Learn how to safely use, store and throw away your medicines at www.disposemymeds.org.          This list is accurate as of 8/27/18  2:47 PM.  Always use your most recent med list.                   Brand Name Dispense Instructions for use Diagnosis    adalimumab 40 MG/0.8ML prefilled syringe kit    HUMIRA     Inject 40 mg Subcutaneous every 14 days        FLUoxetine 20 MG capsule    PROzac    180 capsule    Take 2 capsules (40 mg) by mouth daily    CLARITZA (generalized anxiety disorder), Substance abuse, Major depressive disorder, single episode, mild (H)       TYLENOL PO      Take 1,000 mg by mouth every 6 hours as  needed for mild pain or fever

## 2018-08-27 NOTE — PATIENT INSTRUCTIONS
"Discussed increasing dose to 40 mg (2 X 20 mg) once daily and monitor symptoms over the next several weeks.  If you are still experiencing some \"wear off\" or worsening moods in the evenings - then could split dosing by taking 20 mg in the am and 20 mg at dinnertime.    Monitor sleep concerns.  And follow up if this gets worse or does not improve.  Short term can try over the counter Melatonin 2-5 mg at bedtime - take 1 hour prior to bedtime.    General recommendations for sleep problems (Insomnia)  Allow 2-4 weeks to see results   Establish a regular sleep schedule   Go to bed at same time each night   Get up at same time each day   Avoid sleeping-in on Sunday morning   Cut down time in bed (if not asleep, get up)   Avoid trying to force yourself to sleep   Use your bed only for sleep and sex   Do not read or watch Television in bed   Make the bedroom comfortable   Keep temperature in your bedroom comfortable   Keep bedroom quiet when sleeping   Consider ear plugs (silicon)   Keep bedroom dark enough   Use dark blinds or wear an eye mask if needed   Relax at bedtime   Relax each muscle group individually   Begin with your feet   Work toward your head   Deal with your worries before bedtime   Set aside a worry time for 30 minutes earlier   Listen to relaxation tapes   Classical Music or Nature sounds   Imagine a tranquil scene (e.g. waterfall or beach)   Back Massage   Gentle 5-minute back rub prior to bedtime   Perform measures to make you tired at bedtime   Get regular Exercise each day (6 hours before bedtime)   Take medications only as directed   Eat a light bedtime snack or warm drink   Warm milk   Warm herbal tea (non-caffeinated)   Special Therapy Measures   Sleep Restriction Therapy   Limit time in bed for 15 minutes more than sleep   Do not set limit under 4 hours   Increase time by 15 minutes per effective sleep trial   Effective sleep suggests >90% of bedtime asleep   Stimulus Control   Do not lie awake " for more than 30 minutes   Get out of bed and perform a quiet activity   Return to bed when sleepy   Repeat as many times per night as needed   No Napping during day   Things to avoid   Do not Exercise just before bedtime   No overstimulating activities just before bed   No competitive games before bedtime   No exciting Television programs before bedtime   Avoid caffeine after lunchtime   Avoid chocolate   Avoid coffee, tea, or soda   Do not use alcohol to induce sleep (worsens Insomnia)   Do not take someone else's sleeping pills   Do not look at the clock when awakening   Do not turn on light when getting up to use bathroom   Read the book Say God Night To Insomnia    Smiley Rivera, FNP

## 2018-08-27 NOTE — PROGRESS NOTES
SUBJECTIVE:   Carlyle Archibald is a 18 year old male who presents to clinic today for the following health issues:    Depression and Anxiety Follow-Up    Status since last visit: No change - he would like to discuss increasing the dose.     Other associated symptoms:None    Complicating factors:     Significant life event: Yes-  School stress      Current substance abuse: None    Started on Fluoxetine 20 mg with last clinic visit 1 month ago.  ? About diagnosis of bipolar at last visit.  Referral given for Psychiatry.    Reports some improvement in depressive symptoms and mood changes.  At first no change - then slowly noticed improvement.  PHQ-9 reduced to 8 today at visit.        PHQ-9 2/23/2017 3/21/2017 7/25/2018   Total Score 12 14 21   Q9: Suicide Ideation Not at all Several days More than half the days     CLARITZA-7 SCORE 2/23/2017 3/21/2017 7/25/2018   Total Score - - -   Total Score 9 16 17     In the past two weeks have you had thoughts of suicide or self-harm?  No  In the past two weeks have you thought of a plan or intent to harm yourself? No.  Do you have concerns about your personal safety or the safety of others?   No  PHQ-9  English  PHQ-9   Any Language  CLARITZA-7  Suicide Assessment Five-step Evaluation and Treatment (SAFE-T)        Amount of exercise or physical activity: 1-2 day/week for an average of 30-45 minutes    Problems taking medications regularly: No    Medication side effects: none    Diet: regular (no restrictions)    Having some sleep issues with staying asleep and frequent awakenings.    Problem list and histories reviewed & adjusted, as indicated.  Additional history: as documented    Patient Active Problem List   Diagnosis     Personal history of disease     Osgood-Schlatter's disease     GERD (gastroesophageal reflux disease)     Crohn's disease (H)     Perianal abscess     Substance abuse     Major depressive disorder, single episode, mild (H)     Anxiety     Past Surgical History:    Procedure Laterality Date     COLONOSCOPY  1/17/2014    Procedure: COMBINED COLONOSCOPY, SINGLE BIOPSY/POLYPECTOMY BY BIOPSY;  Colonoscopy;  Surgeon: Una Alcazar MD;  Location: [facility]     TONSILLECTOMY & ADENOIDECTOMY  about 1 1/3 yo       Social History   Substance Use Topics     Smoking status: Never Smoker     Smokeless tobacco: Never Used     Alcohol use No     Family History   Problem Relation Age of Onset     Allergies Mother      Lipids Mother      Allergies Father      GASTROINTESTINAL DISEASE Father      Crohn's disease     Lipids Maternal Grandmother      Cancer Maternal Grandmother      Lipids Maternal Grandfather      Cancer Maternal Grandfather      Diabetes Paternal Grandfather      Cancer Paternal Grandmother      Anxiety Disorder Sister          Current Outpatient Prescriptions   Medication Sig Dispense Refill     Acetaminophen (TYLENOL PO) Take 1,000 mg by mouth every 6 hours as needed for mild pain or fever       adalimumab (HUMIRA) 40 MG/0.8ML prefilled syringe kit Inject 40 mg Subcutaneous every 14 days       FLUoxetine (PROZAC) 20 MG capsule Take 2 capsules (40 mg) by mouth daily 180 capsule 1     [DISCONTINUED] FLUoxetine (PROZAC) 20 MG capsule Take 1 capsule (20 mg) by mouth daily 30 capsule 1     No Known Allergies  Recent Labs   Lab Test  04/11/16   1720 02/15/16  01/10/16   2045   02/04/14   1051   ALT  27  53  46   < >  33   CR  0.87  0.99  0.95   < >  0.92   GFRESTIMATED  >90  Non  GFR Calc     --   >90  Non  GFR Calc     < >  GFR not calculated, patient <16 years old.   GFRESTBLACK  >90   GFR Calc     --   >90   GFR Calc     < >  GFR not calculated, patient <16 years old.   POTASSIUM  3.6  3.9  3.4   < >  4.7   TSH   --    --    --    --   1.78    < > = values in this interval not displayed.      BP Readings from Last 3 Encounters:   08/27/18 112/68   07/25/18 110/78   03/18/18 131/82    Wt Readings from  "Last 3 Encounters:   08/27/18 140 lb 4.8 oz (63.6 kg)   07/25/18 142 lb (64.4 kg) (28 %)*   03/18/18 150 lb (68 kg) (43 %)*     * Growth percentiles are based on [facility] 2-20 Years data.                  Labs reviewed in EPIC    Reviewed and updated as needed this visit by clinical staff       Reviewed and updated as needed this visit by Provider         ROS:  Constitutional, HEENT, cardiovascular, pulmonary, GI, , musculoskeletal, neuro, skin, endocrine and psych systems are negative, except as otherwise noted.    OBJECTIVE:     /68 (BP Location: Right arm, Patient Position: Chair, Cuff Size: Adult Regular)  Pulse 86  Temp 97.5  F (36.4  C) (Tympanic)  Ht 6' 1\" (1.854 m)  Wt 140 lb 4.8 oz (63.6 kg)  SpO2 97%  BMI 18.51 kg/m2  Body mass index is 18.51 kg/(m^2).  GENERAL: healthy, alert and no distress  PSYCH: mentation appears normal, affect normal/bright    Diagnostic Test Results:  none     ASSESSMENT/PLAN:     1. Major depressive disorder, single episode, mild (H)   Some improvement - 50% reduction.    - FLUoxetine (PROZAC) 20 MG capsule; Take 2 capsules (40 mg) by mouth daily  Dispense: 180 capsule; Refill: 1    2. CLARITZA (generalized anxiety disorder)     - FLUoxetine (PROZAC) 20 MG capsule; Take 2 capsules (40 mg) by mouth daily  Dispense: 180 capsule; Refill: 1    3. Substance abuse     - FLUoxetine (PROZAC) 20 MG capsule; Take 2 capsules (40 mg) by mouth daily  Dispense: 180 capsule; Refill: 1    Patient Instructions   Discussed increasing dose to 40 mg (2 X 20 mg) once daily and monitor symptoms over the next several weeks.  If you are still experiencing some \"wear off\" or worsening moods in the evenings - then could split dosing by taking 20 mg in the am and 20 mg at dinnertime.    Monitor sleep concerns.  And follow up if this gets worse or does not improve.  Short term can try over the counter Melatonin 2-5 mg at bedtime - take 1 hour prior to bedtime.    General recommendations for sleep problems " (Insomnia)  Allow 2-4 weeks to see results   Establish a regular sleep schedule   Go to bed at same time each night   Get up at same time each day   Avoid sleeping-in on Sunday morning   Cut down time in bed (if not asleep, get up)   Avoid trying to force yourself to sleep   Use your bed only for sleep and sex   Do not read or watch Television in bed   Make the bedroom comfortable   Keep temperature in your bedroom comfortable   Keep bedroom quiet when sleeping   Consider ear plugs (silicon)   Keep bedroom dark enough   Use dark blinds or wear an eye mask if needed   Relax at bedtime   Relax each muscle group individually   Begin with your feet   Work toward your head   Deal with your worries before bedtime   Set aside a worry time for 30 minutes earlier   Listen to relaxation tapes   Classical Music or Nature sounds   Imagine a tranquil scene (e.g. waterfall or beach)   Back Massage   Gentle 5-minute back rub prior to bedtime   Perform measures to make you tired at bedtime   Get regular Exercise each day (6 hours before bedtime)   Take medications only as directed   Eat a light bedtime snack or warm drink   Warm milk   Warm herbal tea (non-caffeinated)   Special Therapy Measures   Sleep Restriction Therapy   Limit time in bed for 15 minutes more than sleep   Do not set limit under 4 hours   Increase time by 15 minutes per effective sleep trial   Effective sleep suggests >90% of bedtime asleep   Stimulus Control   Do not lie awake for more than 30 minutes   Get out of bed and perform a quiet activity   Return to bed when sleepy   Repeat as many times per night as needed   No Napping during day   Things to avoid   Do not Exercise just before bedtime   No overstimulating activities just before bed   No competitive games before bedtime   No exciting Television programs before bedtime   Avoid caffeine after lunchtime   Avoid chocolate   Avoid coffee, tea, or soda   Do not use alcohol to induce sleep (worsens Insomnia)    Do not take someone else's sleeping pills   Do not look at the clock when awakening   Do not turn on light when getting up to use bathroom   Read the book Say God Night To Insomnia    GUNNAR Reilly, FARZANEH  [facility]

## 2018-08-28 ASSESSMENT — PATIENT HEALTH QUESTIONNAIRE - PHQ9: SUM OF ALL RESPONSES TO PHQ QUESTIONS 1-9: 8

## 2018-12-03 ENCOUNTER — OFFICE VISIT (OUTPATIENT)
Dept: FAMILY MEDICINE | Facility: CLINIC | Age: 18
End: 2018-12-03
Payer: COMMERCIAL

## 2018-12-03 VITALS
WEIGHT: 144.5 LBS | BODY MASS INDEX: 19.15 KG/M2 | SYSTOLIC BLOOD PRESSURE: 110 MMHG | HEART RATE: 77 BPM | OXYGEN SATURATION: 99 % | DIASTOLIC BLOOD PRESSURE: 70 MMHG | TEMPERATURE: 98.2 F | RESPIRATION RATE: 16 BRPM | HEIGHT: 73 IN

## 2018-12-03 DIAGNOSIS — F19.10 SUBSTANCE ABUSE (H): ICD-10-CM

## 2018-12-03 DIAGNOSIS — F41.9 ANXIETY: ICD-10-CM

## 2018-12-03 DIAGNOSIS — K50.918 CROHN'S DISEASE WITH OTHER COMPLICATION, UNSPECIFIED GASTROINTESTINAL TRACT LOCATION (H): Primary | ICD-10-CM

## 2018-12-03 DIAGNOSIS — F32.0 MAJOR DEPRESSIVE DISORDER, SINGLE EPISODE, MILD (H): ICD-10-CM

## 2018-12-03 DIAGNOSIS — F41.1 GAD (GENERALIZED ANXIETY DISORDER): ICD-10-CM

## 2018-12-03 PROCEDURE — 99214 OFFICE O/P EST MOD 30 MIN: CPT | Performed by: NURSE PRACTITIONER

## 2018-12-03 ASSESSMENT — PATIENT HEALTH QUESTIONNAIRE - PHQ9
5. POOR APPETITE OR OVEREATING: MORE THAN HALF THE DAYS
SUM OF ALL RESPONSES TO PHQ QUESTIONS 1-9: 15

## 2018-12-03 ASSESSMENT — ANXIETY QUESTIONNAIRES
5. BEING SO RESTLESS THAT IT IS HARD TO SIT STILL: SEVERAL DAYS
IF YOU CHECKED OFF ANY PROBLEMS ON THIS QUESTIONNAIRE, HOW DIFFICULT HAVE THESE PROBLEMS MADE IT FOR YOU TO DO YOUR WORK, TAKE CARE OF THINGS AT HOME, OR GET ALONG WITH OTHER PEOPLE: SOMEWHAT DIFFICULT
GAD7 TOTAL SCORE: 13
3. WORRYING TOO MUCH ABOUT DIFFERENT THINGS: MORE THAN HALF THE DAYS
1. FEELING NERVOUS, ANXIOUS, OR ON EDGE: MORE THAN HALF THE DAYS
6. BECOMING EASILY ANNOYED OR IRRITABLE: MORE THAN HALF THE DAYS
2. NOT BEING ABLE TO STOP OR CONTROL WORRYING: MORE THAN HALF THE DAYS
7. FEELING AFRAID AS IF SOMETHING AWFUL MIGHT HAPPEN: MORE THAN HALF THE DAYS

## 2018-12-03 NOTE — NURSING NOTE
"Initial /70 (BP Location: Right arm, Patient Position: Sitting, Cuff Size: Adult Regular)  Pulse 77  Temp 98.2  F (36.8  C) (Tympanic)  Resp 16  Ht 6' 1\" (1.854 m)  Wt 144 lb 8 oz (65.5 kg)  SpO2 99%  BMI 19.06 kg/m2 Estimated body mass index is 19.06 kg/(m^2) as calculated from the following:    Height as of this encounter: 6' 1\" (1.854 m).    Weight as of this encounter: 144 lb 8 oz (65.5 kg). .    Yulia Boyd CMA ([facility])  "

## 2018-12-03 NOTE — PATIENT INSTRUCTIONS
Increase Fluoxetine slightly to 40 mg in am and 20 mg in pm.    Monitor depressive symptoms and notify me via [employer] over the next 2-3 weeks with update on this with increase in dosing and change in twice daily dosing.  Would continue this dosing and timing if symptoms improve over the next several weeks.    If no improvement in symptoms over the next 2-3 weeks, then would wean off and switch medications.    Follow up sooner if moods worsen or problems arise.    GUNNAR Reilly                   Major Depression  What is major depression?   Depression is a condition in which you feel sad, hopeless, and uninterested in daily life. Major depression is severe depression that lasts for at least 2 full weeks.   How does it occur?   Major depression may start after some event or it may not be caused by anything specific. You may have major depression after a period of having dysthymia. Dysthymia is being mildly depressed almost every day for 2 or more years. If major depression develops from dysthymia, you are more likely to have major depression in the future.   People are more likely to develop depression if they:   have family members who have had depression, bipolar disorder, or anxiety problems   are female. Women are twice as likely as men to have major depression   have a major medical problem such as heart disease or cancer   The chemicals in your nervous system and the way that brain cells communicate changes with major depression. Exactly how this works and what it means are not fully understood.   Major depression may start at any age. Teenagers and young adults, as well as older adults, are more likely to have this condition than middle-aged adults.   What are the symptoms?   Besides feeling very sad and uninterested in things you usually enjoy, you may also:   be irritable   have trouble falling asleep, wake up very early, or sleep too much   feel more anxiety or panic   notice changes in your appetite  and weight, either up or down   notice changes in your energy level, usually down but sometimes feeling overexcited   lose sexual desire and function   feel worthless and guilty   have trouble concentrating or remembering things   feel hopeless or just not care about anything   have unexplained physical symptoms   think often about death or suicide   Other symptoms may vary with age. If you are a teenager, you may be irritable, get angry, abuse substances, and cause trouble with parents and at school. If you are a young or middle-aged adult, you may abuse substances such as drugs or alcohol, have physical problems (like pain or stomach upsets), or feel nervous.   Depressed older people are more likely to complain of physical problems than that they are feeling sad, anxious, or hopeless. Tiredness, mood changes, sleepiness, and memory problems may be side effects of medicines rather than symptoms of depression. Other medical conditions, such as diabetes, heart disease, and Alzheimer's disease, can also cause similar symptoms.   How is it diagnosed?   Your healthcare provider or a mental health professional will ask about your symptoms and any drug or alcohol use. You may have lab tests to rule out medical problems such as hormone imbalances. There are no lab tests that directly diagnose depression.   How is it treated?   Do not try to overcome clinical depression by yourself. It can usually be successfully treated with psychotherapy, antidepressant medicine, or both. Discuss this with your healthcare provider or therapist.   Medicine  Several types of prescription medicines can help treat major depression. Your healthcare provider will work with you to carefully select the right medicine for you.   You must take these medicines daily for 3 to 6 weeks to get full benefit from them. Most people benefit from taking these medicines for at least 6 months.   No nonprescription medicines are effective to treat major  depression.   Psychotherapy   Seeing a mental health therapist can help with all forms of depression. You may need therapy for a short time or for many months. One very helpful form of psychotherapy is cognitive behavioral therapy (CBT). CBT helps you identify and change thought processes that can lead to depression. Replacing negative thoughts with more positive ones reduces depression. Interpersonal therapy has also been shown to work very well.   Diets rich in fruits and vegetables are recommended for people with depression. A multivitamin and mineral supplement may also be recommended.   Claims have been made that certain herbal and dietary products help control depression symptoms. Omega-3 fatty acids may help to reduce symptoms of depression. Sherri's wort may help mild symptoms of depression. It will not help severe cases of depression. It may worsen bipolar disorder. No herb or dietary supplement has been proven to consistently or completely relieve depression. Supplements are not tested or standardized and may vary in strengths and effects. They may have side effects and are not always safe.   Learning ways to relax may help. Yoga and meditation may also be helpful. You may want to talk with your healthcare provider about using these methods along with medicines and psychotherapy.   How long will the effects last?   Major depression usually improves within a few weeks. Some people have it only once, while others have many episodes. Major depression can be shortened, and possibly prevented, with treatment.   What can I do to help myself or my loved one?   Seeking treatment quickly is the best thing to do. Watch closely for the signs of depression. Get treatment before the symptoms become bad.   Certain medicines can add to the symptoms of depression. If you have had depression, tell all healthcare providers who treat you about all medicines you are taking, including nonprescription products and natural  remedies.   Maintaining a healthy lifestyle and social activities are most important. To help prevent depression:   Exercise for at least 30 minutes every day, for example a brisk walk.   Learn which activities make you feel better and do them often.   Talk to your family and friends.   Eat a healthy diet.   Avoid alcohol, caffeine, and nicotine.   Do not use drugs.   Learn ways to lower stress, such as breathing and muscle relaxation exercises.   When should I seek help?   If you are showing the signs of major depression, seek professional help quickly. Do not try to treat your depression by yourself. Professional treatment is necessary.   Most of the time, you will feel much better after a few weeks of treatment. Some people with untreated major depression commit suicide. Many more attempt suicide or try to hurt themselves. After treatment and feeling better, these same people usually cannot believe that once they felt so bad and wanted to die.   Get emergency care if you or a loved one has serious thoughts of suicide or harming others.   For more information, see:   Depression: Its Symptoms and Treatment  Adjustment Disorders with Depressed Mood  Cognitive Therapy    Published by [employer].  This content is reviewed periodically and is subject to change as new health information becomes available. The information is intended to inform and educate and is not a replacement for medical evaluation, advice, diagnosis or treatment by a healthcare professional.   Written by Raysa Blackwell, PhD, for [employer].   ? 2010 [employer] and/or its affiliates. All Rights Reserved.   Copyright   [employer] 2011  Adult Health Advisor

## 2018-12-03 NOTE — PROGRESS NOTES
SUBJECTIVE:   Carlyle Archibald is a 18 year old male who presents to clinic today for the following health issues:    Depression and Anxiety Follow-Up    Status since last visit: Worsened - he would like to discuss his medication. He takes 2 capsules in the AM and feels like they wear off too fasting during the day.     Other associated symptoms:None    Complicating factors:     Significant life event: No     Current substance abuse: None    PHQ 3/21/2017 7/25/2018 8/27/2018   PHQ-9 Total Score 14 21 8   Q9: Suicide Ideation Several days More than half the days Not at all     CLARITZA-7 SCORE 2/23/2017 3/21/2017 7/25/2018   Total Score - - -   Total Score 9 16 17     Started on Fluoxetine in July and then dose increased in August - had initial good improvement but now feels like he is back to where he was.    School is a big stressor.  No side effects.  ? About diagnosis of bipolar at last visit.  Referral given for Psychiatry.     Reports some improvement in depressive symptoms and mood changes.  At first no change - then slowly noticed improvement.  PHQ-9 reduced to 8 today at visit.    Using Humira for Crohn's.    Has tried Sertraline - low dose but stopped soon after.  Not sure why.    In the past two weeks have you had thoughts of suicide or self-harm?  No.    Do you have concerns about your personal safety or the safety of others?   No  PHQ-9  English  PHQ-9   Any Language  CLARITZA-7  Suicide Assessment Five-step Evaluation and Treatment (SAFE-T)    Amount of exercise or physical activity: None    Problems taking medications regularly: No    Medication side effects: none    Diet: regular (no restrictions)      Problem list and histories reviewed & adjusted, as indicated.  Additional history: as documented    Patient Active Problem List   Diagnosis     Personal history of disease     Osgood-Schlatter's disease     GERD (gastroesophageal reflux disease)     Crohn's disease (H)     Perianal abscess     Substance abuse  (H)     Major depressive disorder, single episode, mild (H)     Anxiety     Past Surgical History:   Procedure Laterality Date     COLONOSCOPY  1/17/2014    Procedure: COMBINED COLONOSCOPY, SINGLE BIOPSY/POLYPECTOMY BY BIOPSY;  Colonoscopy;  Surgeon: Una Alcazar MD;  Location: [facility]     TONSILLECTOMY & ADENOIDECTOMY  about 1 1/1 yo       Social History   Substance Use Topics     Smoking status: Never Smoker     Smokeless tobacco: Never Used     Alcohol use No     Family History   Problem Relation Age of Onset     Allergies Mother      Lipids Mother      Allergies Father      GASTROINTESTINAL DISEASE Father      Crohn's disease     Lipids Maternal Grandmother      Cancer Maternal Grandmother      Lipids Maternal Grandfather      Cancer Maternal Grandfather      Diabetes Paternal Grandfather      Cancer Paternal Grandmother      Anxiety Disorder Sister          Current Outpatient Prescriptions   Medication Sig Dispense Refill     Acetaminophen (TYLENOL PO) Take 1,000 mg by mouth every 6 hours as needed for mild pain or fever       adalimumab (HUMIRA) 40 MG/0.8ML prefilled syringe kit Inject 40 mg Subcutaneous every 14 days       FLUoxetine (PROZAC) 20 MG capsule Take 2 capsules (40 mg) by mouth daily 180 capsule 1     No Known Allergies  Recent Labs   Lab Test  04/11/16   1720 02/15/16  01/10/16   2045   02/04/14   1051   ALT  27  53  46   < >  33   CR  0.87  0.99  0.95   < >  0.92   GFRESTIMATED  >90  Non  GFR Calc     --   >90  Non  GFR Calc     < >  GFR not calculated, patient <16 years old.   GFRESTBLACK  >90   GFR Calc     --   >90   GFR Calc     < >  GFR not calculated, patient <16 years old.   POTASSIUM  3.6  3.9  3.4   < >  4.7   TSH   --    --    --    --   1.78    < > = values in this interval not displayed.      BP Readings from Last 3 Encounters:   12/03/18 110/70   08/27/18 112/68   07/25/18 110/78    Wt Readings from Last 3  "Encounters:   12/03/18 144 lb 8 oz (65.5 kg)   08/27/18 140 lb 4.8 oz (63.6 kg)   07/25/18 142 lb (64.4 kg) (28 %)*     * Growth percentiles are based on [facility] 2-20 Years data.                  Labs reviewed in EPIC    Reviewed and updated as needed this visit by clinical staff       Reviewed and updated as needed this visit by Provider         ROS:  Constitutional, HEENT, cardiovascular, pulmonary, GI, , musculoskeletal, neuro, skin, endocrine and psych systems are negative, except as otherwise noted.    OBJECTIVE:     /70 (BP Location: Right arm, Patient Position: Sitting, Cuff Size: Adult Regular)  Pulse 77  Temp 98.2  F (36.8  C) (Tympanic)  Resp 16  Ht 6' 1\" (1.854 m)  Wt 144 lb 8 oz (65.5 kg)  SpO2 99%  BMI 19.06 kg/m2  Body mass index is 19.06 kg/(m^2).  GENERAL: healthy, alert and no distress  PSYCH: mentation appears normal, affect normal/bright    Diagnostic Test Results:  none     ASSESSMENT/PLAN:     1. Anxiety   Worsening.    2. Major depressive disorder, single episode, mild (H)  Worsening - feels like he has swings with depressive symptoms. Lows  Split dosing as he feels this wears off in the evening.  If not effective would possible switch to Effexor or Sertraline.    - FLUoxetine (PROZAC) 20 MG capsule; Take 40 mg in the am and 20 mg in pm.  Dispense: 90 capsule; Refill: 1    3. Crohn's disease with other complication, unspecified gastrointestinal tract location (H)   Stable - on Humira.    4. Substance abuse (H)  Denies current use.    - FLUoxetine (PROZAC) 20 MG capsule; Take 40 mg in the am and 20 mg in pm.  Dispense: 90 capsule; Refill: 1    5. CLARITZA (generalized anxiety disorder)     - FLUoxetine (PROZAC) 20 MG capsule; Take 40 mg in the am and 20 mg in pm.  Dispense: 90 capsule; Refill: 1      Patient Instructions   Increase Fluoxetine slightly to 40 mg in am and 20 mg in pm.    Monitor depressive symptoms and notify me via [employer] over the next 2-3 weeks with update on this with " increase in dosing and change in twice daily dosing.  Would continue this dosing and timing if symptoms improve over the next several weeks.    If no improvement in symptoms over the next 2-3 weeks, then would wean off and switch medications.    Follow up sooner if moods worsen or problems arise.    GUNNAR Reilly                   Major Depression  What is major depression?   Depression is a condition in which you feel sad, hopeless, and uninterested in daily life. Major depression is severe depression that lasts for at least 2 full weeks.   How does it occur?   Major depression may start after some event or it may not be caused by anything specific. You may have major depression after a period of having dysthymia. Dysthymia is being mildly depressed almost every day for 2 or more years. If major depression develops from dysthymia, you are more likely to have major depression in the future.   People are more likely to develop depression if they:   have family members who have had depression, bipolar disorder, or anxiety problems   are female. Women are twice as likely as men to have major depression   have a major medical problem such as heart disease or cancer   The chemicals in your nervous system and the way that brain cells communicate changes with major depression. Exactly how this works and what it means are not fully understood.   Major depression may start at any age. Teenagers and young adults, as well as older adults, are more likely to have this condition than middle-aged adults.   What are the symptoms?   Besides feeling very sad and uninterested in things you usually enjoy, you may also:   be irritable   have trouble falling asleep, wake up very early, or sleep too much   feel more anxiety or panic   notice changes in your appetite and weight, either up or down   notice changes in your energy level, usually down but sometimes feeling overexcited   lose sexual desire and function   feel worthless  and guilty   have trouble concentrating or remembering things   feel hopeless or just not care about anything   have unexplained physical symptoms   think often about death or suicide   Other symptoms may vary with age. If you are a teenager, you may be irritable, get angry, abuse substances, and cause trouble with parents and at school. If you are a young or middle-aged adult, you may abuse substances such as drugs or alcohol, have physical problems (like pain or stomach upsets), or feel nervous.   Depressed older people are more likely to complain of physical problems than that they are feeling sad, anxious, or hopeless. Tiredness, mood changes, sleepiness, and memory problems may be side effects of medicines rather than symptoms of depression. Other medical conditions, such as diabetes, heart disease, and Alzheimer's disease, can also cause similar symptoms.   How is it diagnosed?   Your healthcare provider or a mental health professional will ask about your symptoms and any drug or alcohol use. You may have lab tests to rule out medical problems such as hormone imbalances. There are no lab tests that directly diagnose depression.   How is it treated?   Do not try to overcome clinical depression by yourself. It can usually be successfully treated with psychotherapy, antidepressant medicine, or both. Discuss this with your healthcare provider or therapist.   Medicine  Several types of prescription medicines can help treat major depression. Your healthcare provider will work with you to carefully select the right medicine for you.   You must take these medicines daily for 3 to 6 weeks to get full benefit from them. Most people benefit from taking these medicines for at least 6 months.   No nonprescription medicines are effective to treat major depression.   Psychotherapy   Seeing a mental health therapist can help with all forms of depression. You may need therapy for a short time or for many months. One very  helpful form of psychotherapy is cognitive behavioral therapy (CBT). CBT helps you identify and change thought processes that can lead to depression. Replacing negative thoughts with more positive ones reduces depression. Interpersonal therapy has also been shown to work very well.   Diets rich in fruits and vegetables are recommended for people with depression. A multivitamin and mineral supplement may also be recommended.   Claims have been made that certain herbal and dietary products help control depression symptoms. Omega-3 fatty acids may help to reduce symptoms of depression. Ballplay's wort may help mild symptoms of depression. It will not help severe cases of depression. It may worsen bipolar disorder. No herb or dietary supplement has been proven to consistently or completely relieve depression. Supplements are not tested or standardized and may vary in strengths and effects. They may have side effects and are not always safe.   Learning ways to relax may help. Yoga and meditation may also be helpful. You may want to talk with your healthcare provider about using these methods along with medicines and psychotherapy.   How long will the effects last?   Major depression usually improves within a few weeks. Some people have it only once, while others have many episodes. Major depression can be shortened, and possibly prevented, with treatment.   What can I do to help myself or my loved one?   Seeking treatment quickly is the best thing to do. Watch closely for the signs of depression. Get treatment before the symptoms become bad.   Certain medicines can add to the symptoms of depression. If you have had depression, tell all healthcare providers who treat you about all medicines you are taking, including nonprescription products and natural remedies.   Maintaining a healthy lifestyle and social activities are most important. To help prevent depression:   Exercise for at least 30 minutes every day, for example  a brisk walk.   Learn which activities make you feel better and do them often.   Talk to your family and friends.   Eat a healthy diet.   Avoid alcohol, caffeine, and nicotine.   Do not use drugs.   Learn ways to lower stress, such as breathing and muscle relaxation exercises.   When should I seek help?   If you are showing the signs of major depression, seek professional help quickly. Do not try to treat your depression by yourself. Professional treatment is necessary.   Most of the time, you will feel much better after a few weeks of treatment. Some people with untreated major depression commit suicide. Many more attempt suicide or try to hurt themselves. After treatment and feeling better, these same people usually cannot believe that once they felt so bad and wanted to die.   Get emergency care if you or a loved one has serious thoughts of suicide or harming others.   For more information, see:   Depression: Its Symptoms and Treatment  Adjustment Disorders with Depressed Mood  Cognitive Therapy    Published by [employer].  This content is reviewed periodically and is subject to change as new health information becomes available. The information is intended to inform and educate and is not a replacement for medical evaluation, advice, diagnosis or treatment by a healthcare professional.   Written by Raysa Blackwell, PhD, for [employer].   ? 2010 [employer] and/or its affiliates. All Rights Reserved.   Copyright   [employer] 2011  Adult Health Advisor            Smiley Rivera NP  [facility]

## 2018-12-03 NOTE — MR AVS SNAPSHOT
After Visit Summary   12/3/2018    Carlyle Archibald    MRN: [id number]           Patient Information     Date Of Birth          2000        Visit Information        Provider Department      12/3/2018 8:20 AM Smiley Rivera NP [facility]        Today's Diagnoses     Crohn's disease with other complication, unspecified gastrointestinal tract location (H)    -  1    Anxiety        Major depressive disorder, single episode, mild (H)        Substance abuse (H)        CLARITZA (generalized anxiety disorder)          Care Instructions    Increase Fluoxetine slightly to 40 mg in am and 20 mg in pm.    Monitor depressive symptoms and notify me via [employer] over the next 2-3 weeks with update on this with increase in dosing and change in twice daily dosing.  Would continue this dosing and timing if symptoms improve over the next several weeks.    If no improvement in symptoms over the next 2-3 weeks, then would wean off and switch medications.    Follow up sooner if moods worsen or problems arise.    GUNNAR Reilly                   Major Depression  What is major depression?   Depression is a condition in which you feel sad, hopeless, and uninterested in daily life. Major depression is severe depression that lasts for at least 2 full weeks.   How does it occur?   Major depression may start after some event or it may not be caused by anything specific. You may have major depression after a period of having dysthymia. Dysthymia is being mildly depressed almost every day for 2 or more years. If major depression develops from dysthymia, you are more likely to have major depression in the future.   People are more likely to develop depression if they:   have family members who have had depression, bipolar disorder, or anxiety problems   are female. Women are twice as likely as men to have major depression   have a major medical problem such as heart disease or cancer   The chemicals in your  nervous system and the way that brain cells communicate changes with major depression. Exactly how this works and what it means are not fully understood.   Major depression may start at any age. Teenagers and young adults, as well as older adults, are more likely to have this condition than middle-aged adults.   What are the symptoms?   Besides feeling very sad and uninterested in things you usually enjoy, you may also:   be irritable   have trouble falling asleep, wake up very early, or sleep too much   feel more anxiety or panic   notice changes in your appetite and weight, either up or down   notice changes in your energy level, usually down but sometimes feeling overexcited   lose sexual desire and function   feel worthless and guilty   have trouble concentrating or remembering things   feel hopeless or just not care about anything   have unexplained physical symptoms   think often about death or suicide   Other symptoms may vary with age. If you are a teenager, you may be irritable, get angry, abuse substances, and cause trouble with parents and at school. If you are a young or middle-aged adult, you may abuse substances such as drugs or alcohol, have physical problems (like pain or stomach upsets), or feel nervous.   Depressed older people are more likely to complain of physical problems than that they are feeling sad, anxious, or hopeless. Tiredness, mood changes, sleepiness, and memory problems may be side effects of medicines rather than symptoms of depression. Other medical conditions, such as diabetes, heart disease, and Alzheimer's disease, can also cause similar symptoms.   How is it diagnosed?   Your healthcare provider or a mental health professional will ask about your symptoms and any drug or alcohol use. You may have lab tests to rule out medical problems such as hormone imbalances. There are no lab tests that directly diagnose depression.   How is it treated?   Do not try to overcome clinical  depression by yourself. It can usually be successfully treated with psychotherapy, antidepressant medicine, or both. Discuss this with your healthcare provider or therapist.   Medicine  Several types of prescription medicines can help treat major depression. Your healthcare provider will work with you to carefully select the right medicine for you.   You must take these medicines daily for 3 to 6 weeks to get full benefit from them. Most people benefit from taking these medicines for at least 6 months.   No nonprescription medicines are effective to treat major depression.   Psychotherapy   Seeing a mental health therapist can help with all forms of depression. You may need therapy for a short time or for many months. One very helpful form of psychotherapy is cognitive behavioral therapy (CBT). CBT helps you identify and change thought processes that can lead to depression. Replacing negative thoughts with more positive ones reduces depression. Interpersonal therapy has also been shown to work very well.   Diets rich in fruits and vegetables are recommended for people with depression. A multivitamin and mineral supplement may also be recommended.   Claims have been made that certain herbal and dietary products help control depression symptoms. Omega-3 fatty acids may help to reduce symptoms of depression. Sherri's wort may help mild symptoms of depression. It will not help severe cases of depression. It may worsen bipolar disorder. No herb or dietary supplement has been proven to consistently or completely relieve depression. Supplements are not tested or standardized and may vary in strengths and effects. They may have side effects and are not always safe.   Learning ways to relax may help. Yoga and meditation may also be helpful. You may want to talk with your healthcare provider about using these methods along with medicines and psychotherapy.   How long will the effects last?   Major depression usually  improves within a few weeks. Some people have it only once, while others have many episodes. Major depression can be shortened, and possibly prevented, with treatment.   What can I do to help myself or my loved one?   Seeking treatment quickly is the best thing to do. Watch closely for the signs of depression. Get treatment before the symptoms become bad.   Certain medicines can add to the symptoms of depression. If you have had depression, tell all healthcare providers who treat you about all medicines you are taking, including nonprescription products and natural remedies.   Maintaining a healthy lifestyle and social activities are most important. To help prevent depression:   Exercise for at least 30 minutes every day, for example a brisk walk.   Learn which activities make you feel better and do them often.   Talk to your family and friends.   Eat a healthy diet.   Avoid alcohol, caffeine, and nicotine.   Do not use drugs.   Learn ways to lower stress, such as breathing and muscle relaxation exercises.   When should I seek help?   If you are showing the signs of major depression, seek professional help quickly. Do not try to treat your depression by yourself. Professional treatment is necessary.   Most of the time, you will feel much better after a few weeks of treatment. Some people with untreated major depression commit suicide. Many more attempt suicide or try to hurt themselves. After treatment and feeling better, these same people usually cannot believe that once they felt so bad and wanted to die.   Get emergency care if you or a loved one has serious thoughts of suicide or harming others.   For more information, see:   Depression: Its Symptoms and Treatment  Adjustment Disorders with Depressed Mood  Cognitive Therapy    Published by [facility].  This content is reviewed periodically and is subject to change as new health information becomes available. The information is intended to inform and educate and  "is not a replacement for medical evaluation, advice, diagnosis or treatment by a healthcare professional.   Written by Raysa Blackwell, PhD, for [employer].   ? 2010 [employer] and/or its affiliates. All Rights Reserved.   Copyright   [employer] 2011  Adult Health Advisor                Follow-ups after your visit        Who to contact     If you have questions or need follow up information about today's clinic visit or your schedule please contact [facility] directly at [phone].  Normal or non-critical lab and imaging results will be communicated to you by [employer], letter or phone within 4 business days after the clinic has received the results. If you do not hear from us within 7 days, please contact the clinic through [employer] or phone. If you have a critical or abnormal lab result, we will notify you by phone as soon as possible.  Submit refill requests through [employer] or call your pharmacy and they will forward the refill request to us. Please allow 3 business days for your refill to be completed.          Additional Information About Your Visit        [employer] Information     [employer] gives you secure access to your electronic health record. If you see a primary care provider, you can also send messages to your care team and make appointments. If you have questions, please call your primary care clinic.  If you do not have a primary care provider, please call [phone] and they will assist you.        Care EveryWhere ID     This is your Care EveryWhere ID. This could be used by other organizations to access your [facility] medical records  [id number]        Your Vitals Were     Pulse Temperature Respirations Height Pulse Oximetry BMI (Body Mass Index)    77 98.2  F (36.8  C) (Tympanic) 16 6' 1\" (1.854 m) 99% 19.06 kg/m2       Blood Pressure from Last 3 Encounters:   12/03/18 110/70   08/27/18 112/68   07/25/18 110/78    Weight from Last 3 Encounters:   12/03/18 144 lb 8 oz " (65.5 kg)   08/27/18 140 lb 4.8 oz (63.6 kg)   07/25/18 142 lb (64.4 kg) (28 %)*     * Growth percentiles are based on [facility] 2-20 Years data.              Today, you had the following     No orders found for display         Today's Medication Changes          These changes are accurate as of 12/3/18  9:06 AM.  If you have any questions, ask your nurse or doctor.               These medicines have changed or have updated prescriptions.        Dose/Directions    FLUoxetine 20 MG capsule   Commonly known as:  PROzac   This may have changed:    - how much to take  - how to take this  - when to take this  - additional instructions   Used for:  CLARITZA (generalized anxiety disorder), Substance abuse (H), Major depressive disorder, single episode, mild (H)   Changed by:  Smiley Rivera NP        Take 40 mg in the am and 20 mg in pm.   Quantity:  90 capsule   Refills:  1            Where to get your medicines      These medications were sent to [facility] - [place], [place] - [address] [address]     Phone:  [phone]     FLUoxetine 20 MG capsule                Primary Care Provider Office Phone # Fax #    Smiley Rivera [phone] [address] [facility] [address]        Equal Access to Services     GARRET SALINAS AH: Hadii garry ku hadasho Soomaali, waaxda luqadaha, qaybta kaalmada adeegyada, waxay michaelin hayleann duran. So [facility] [phone].    ATENCIÓN: Si habla español, tiene a rivas disposición servicios gratuitos de asistencia lingüística. Llame al [phone].    We comply with applicable federal civil rights laws and [place] laws. We do not discriminate on the basis of race, color, national origin, age, disability, sex, sexual orientation, or gender identity.            Thank you!     Thank you for choosing [facility]  for your care. Our goal is always to provide you with excellent care. Hearing back from our  patients is one way we can continue to improve our services. Please take a few minutes to complete the written survey that you may receive in the mail after your visit with us. Thank you!             Your Updated Medication List - Protect others around you: Learn how to safely use, store and throw away your medicines at www.disposemymeds.org.          This list is accurate as of 12/3/18  9:06 AM.  Always use your most recent med list.                   Brand Name Dispense Instructions for use Diagnosis    adalimumab 40 MG/0.8ML prefilled syringe kit    HUMIRA     Inject 40 mg Subcutaneous every 14 days        FLUoxetine 20 MG capsule    PROzac    90 capsule    Take 40 mg in the am and 20 mg in pm.    CLARITZA (generalized anxiety disorder), Substance abuse (H), Major depressive disorder, single episode, mild (H)       TYLENOL PO      Take 1,000 mg by mouth every 6 hours as needed for mild pain or fever

## 2018-12-04 ASSESSMENT — ANXIETY QUESTIONNAIRES: GAD7 TOTAL SCORE: 13

## 2019-03-25 ENCOUNTER — TRANSFERRED RECORDS (OUTPATIENT)
Dept: HEALTH INFORMATION MANAGEMENT | Facility: CLINIC | Age: 19
End: 2019-03-25

## 2019-06-12 ENCOUNTER — TELEPHONE (OUTPATIENT)
Dept: FAMILY MEDICINE | Facility: CLINIC | Age: 19
End: 2019-06-12

## 2019-06-12 DIAGNOSIS — F41.1 GAD (GENERALIZED ANXIETY DISORDER): ICD-10-CM

## 2019-06-12 DIAGNOSIS — F32.0 MAJOR DEPRESSIVE DISORDER, SINGLE EPISODE, MILD (H): ICD-10-CM

## 2019-06-12 DIAGNOSIS — F19.10 SUBSTANCE ABUSE (H): ICD-10-CM

## 2019-06-12 NOTE — TELEPHONE ENCOUNTER
"Requested Prescriptions   Pending Prescriptions Disp Refills     FLUoxetine (PROZAC) 20 MG capsule 90 capsule 1     Sig: Take 40 mg in the am and 20 mg in pm.       SSRIs Protocol Failed - 6/12/2019 11:29 AM        Failed - PHQ-9 score less than 5 in past 6 months     Please review last PHQ-9 score.           Failed - Recent (6 mo) or future (30 days) visit within the authorizing provider's specialty     Patient had office visit in the last 6 months or has a visit in the next 30 days with authorizing provider or within the authorizing provider's specialty.  See \"Patient Info\" tab in inbasket, or \"Choose Columns\" in Meds & Orders section of the refill encounter.            Passed - Medication is active on med list        Passed - Patient is age 18 or older        Last Written Prescription Date:  12/3/18  Last Fill Quantity: 90,  # refills: 1   Last office visit: 12/3/2018 with prescribing provider:   Miguel  Future Office Visit:      "

## 2019-06-12 NOTE — TELEPHONE ENCOUNTER
**This refill requires provider completion and is not appropriate for RN review per RN refill guidelines.**  PH-Q9 needs to be less than 5 to approve medication on RN Refill protocol pt's score is 15.  Marizol Jj RN

## 2019-06-14 ASSESSMENT — ANXIETY QUESTIONNAIRES
2. NOT BEING ABLE TO STOP OR CONTROL WORRYING: MORE THAN HALF THE DAYS
IF YOU CHECKED OFF ANY PROBLEMS ON THIS QUESTIONNAIRE, HOW DIFFICULT HAVE THESE PROBLEMS MADE IT FOR YOU TO DO YOUR WORK, TAKE CARE OF THINGS AT HOME, OR GET ALONG WITH OTHER PEOPLE: SOMEWHAT DIFFICULT
5. BEING SO RESTLESS THAT IT IS HARD TO SIT STILL: SEVERAL DAYS
6. BECOMING EASILY ANNOYED OR IRRITABLE: SEVERAL DAYS
3. WORRYING TOO MUCH ABOUT DIFFERENT THINGS: MORE THAN HALF THE DAYS
GAD7 TOTAL SCORE: 8
7. FEELING AFRAID AS IF SOMETHING AWFUL MIGHT HAPPEN: NOT AT ALL
1. FEELING NERVOUS, ANXIOUS, OR ON EDGE: SEVERAL DAYS

## 2019-06-14 ASSESSMENT — PATIENT HEALTH QUESTIONNAIRE - PHQ9
SUM OF ALL RESPONSES TO PHQ QUESTIONS 1-9: 9
5. POOR APPETITE OR OVEREATING: SEVERAL DAYS

## 2019-06-14 NOTE — TELEPHONE ENCOUNTER
Patient notified.  Sridevi PEDRO RN      PHQ-9 SCORE 8/27/2018 12/3/2018 6/14/2019   PHQ-9 Total Score - - -   PHQ-9 Total Score MyChart - - -   PHQ-9 Total Score 8 15 9     CLARITZA-7 SCORE 7/25/2018 12/3/2018 6/14/2019   Total Score - - -   Total Score 17 13 8

## 2019-06-15 ASSESSMENT — ANXIETY QUESTIONNAIRES: GAD7 TOTAL SCORE: 8

## 2019-08-13 ENCOUNTER — TELEPHONE (OUTPATIENT)
Dept: BEHAVIORAL HEALTH | Facility: CLINIC | Age: 19
End: 2019-08-13

## 2019-08-14 ENCOUNTER — OFFICE VISIT (OUTPATIENT)
Dept: FAMILY MEDICINE | Facility: CLINIC | Age: 19
End: 2019-08-14
Payer: COMMERCIAL

## 2019-08-14 VITALS
DIASTOLIC BLOOD PRESSURE: 80 MMHG | RESPIRATION RATE: 12 BRPM | SYSTOLIC BLOOD PRESSURE: 110 MMHG | TEMPERATURE: 96.9 F | OXYGEN SATURATION: 98 % | WEIGHT: 140.3 LBS | HEART RATE: 85 BPM | BODY MASS INDEX: 18.59 KG/M2 | HEIGHT: 73 IN

## 2019-08-14 DIAGNOSIS — R45.4 IRRITABILITY AND ANGER: ICD-10-CM

## 2019-08-14 DIAGNOSIS — F41.9 ANXIETY: Primary | ICD-10-CM

## 2019-08-14 DIAGNOSIS — F32.0 MAJOR DEPRESSIVE DISORDER, SINGLE EPISODE, MILD (H): ICD-10-CM

## 2019-08-14 PROCEDURE — 99214 OFFICE O/P EST MOD 30 MIN: CPT | Performed by: NURSE PRACTITIONER

## 2019-08-14 RX ORDER — ESCITALOPRAM OXALATE 10 MG/1
TABLET ORAL
Qty: 30 TABLET | Refills: 1 | Status: SHIPPED | OUTPATIENT
Start: 2019-08-14 | End: 2021-02-03

## 2019-08-14 ASSESSMENT — ANXIETY QUESTIONNAIRES
GAD7 TOTAL SCORE: 10
3. WORRYING TOO MUCH ABOUT DIFFERENT THINGS: MORE THAN HALF THE DAYS
2. NOT BEING ABLE TO STOP OR CONTROL WORRYING: MORE THAN HALF THE DAYS
6. BECOMING EASILY ANNOYED OR IRRITABLE: NEARLY EVERY DAY
1. FEELING NERVOUS, ANXIOUS, OR ON EDGE: SEVERAL DAYS
7. FEELING AFRAID AS IF SOMETHING AWFUL MIGHT HAPPEN: SEVERAL DAYS
IF YOU CHECKED OFF ANY PROBLEMS ON THIS QUESTIONNAIRE, HOW DIFFICULT HAVE THESE PROBLEMS MADE IT FOR YOU TO DO YOUR WORK, TAKE CARE OF THINGS AT HOME, OR GET ALONG WITH OTHER PEOPLE: SOMEWHAT DIFFICULT
5. BEING SO RESTLESS THAT IT IS HARD TO SIT STILL: NOT AT ALL

## 2019-08-14 ASSESSMENT — MIFFLIN-ST. JEOR: SCORE: 1700.28

## 2019-08-14 ASSESSMENT — PATIENT HEALTH QUESTIONNAIRE - PHQ9
5. POOR APPETITE OR OVEREATING: SEVERAL DAYS
SUM OF ALL RESPONSES TO PHQ QUESTIONS 1-9: 17

## 2019-08-14 NOTE — NURSING NOTE
"Initial /80 (BP Location: Right arm, Patient Position: Sitting, Cuff Size: Adult Regular)   Pulse 85   Temp 96.9  F (36.1  C) (Tympanic)   Resp 12   Ht 1.854 m (6' 1\")   Wt 63.6 kg (140 lb 4.8 oz)   SpO2 98%   BMI 18.51 kg/m   Estimated body mass index is 18.51 kg/m  as calculated from the following:    Height as of this encounter: 1.854 m (6' 1\").    Weight as of this encounter: 63.6 kg (140 lb 4.8 oz). .    Aysha Membreno on 8/14/2019 at 10:30 AM    "

## 2019-08-14 NOTE — PROGRESS NOTES
"Subjective     Carlyle Archibald is a 18 year old male who presents to clinic today for the following health issues:  Chief Complaint   Patient presents with     Depression     follow up         HPI   Depression Followup    How are you doing with your depression since your last visit? No change. He has noticed he is more on edge, and little things irritate him.     Are you having other symptoms that might be associated with depression? YES - he is irritable.    Have you had a significant life event?  No     Are you feeling anxious or having panic attacks?   No    Do you have any concerns with your use of alcohol or other drugs? No    Denies worsened depressive symptoms.  More angry than usual  Less sad and depressed.  Fluoxetine prescribed as 40 mg in am and 20 mg in pm.  Missing doses due to \"work\"  Skips morning dose - then will \"catch\"   More than likely just taking it once daily if at all.    Routinely only taking 1 capsule once daily (20 mg).  Was seeing a counselor - but hasn't in the past few weeks.  Was helping some    Social History     Tobacco Use     Smoking status: Never Smoker     Smokeless tobacco: Never Used   Substance Use Topics     Alcohol use: No     Drug use: No     PHQ 8/27/2018 12/3/2018 6/14/2019   PHQ-9 Total Score 8 15 9   Q9: Thoughts of better off dead/self-harm past 2 weeks Not at all Several days Not at all     CALRITZA-7 SCORE 7/25/2018 12/3/2018 6/14/2019   Total Score - - -   Total Score 17 13 8     No flowsheet data found.  No flowsheet data found.  In the past two weeks have you had thoughts of suicide or self-harm?  No.    Do you have concerns about your personal safety or the safety of others?   No    Suicide Assessment Five-step Evaluation and Treatment (SAFE-T)    Patient Active Problem List   Diagnosis     Personal history of disease     Osgood-Schlatter's disease     GERD (gastroesophageal reflux disease)     Crohn's disease (H)     Perianal abscess     Substance abuse (H)     Major " depressive disorder, single episode, mild (H)     Anxiety     Past Surgical History:   Procedure Laterality Date     COLONOSCOPY  1/17/2014    Procedure: COMBINED COLONOSCOPY, SINGLE BIOPSY/POLYPECTOMY BY BIOPSY;  Colonoscopy;  Surgeon: Una Alcazar MD;  Location: [facility]     TONSILLECTOMY & ADENOIDECTOMY  about 1 1/1 yo       Social History     Tobacco Use     Smoking status: Never Smoker     Smokeless tobacco: Never Used   Substance Use Topics     Alcohol use: No     Family History   Problem Relation Age of Onset     Allergies Mother      Lipids Mother      Allergies Father      Gastrointestinal Disease Father         Crohn's disease     Lipids Maternal Grandmother      Cancer Maternal Grandmother      Lipids Maternal Grandfather      Cancer Maternal Grandfather      Diabetes Paternal Grandfather      Cancer Paternal Grandmother      Anxiety Disorder Sister          Current Outpatient Medications   Medication Sig Dispense Refill     Acetaminophen (TYLENOL PO) Take 1,000 mg by mouth every 6 hours as needed for mild pain or fever       adalimumab (HUMIRA) 40 MG/0.8ML prefilled syringe kit Inject 40 mg Subcutaneous every 14 days       escitalopram (LEXAPRO) 10 MG tablet Take 0.5 tablets (5 mg) by mouth daily for 14 days, THEN 1 tablet (10 mg) daily. 30 tablet 1     No Known Allergies  Recent Labs   Lab Test 04/11/16  1720 02/15/16 01/10/16  2045  02/04/14  1051   ALT 27 53 46   < > 33   CR 0.87 0.99 0.95   < > 0.92   GFRESTIMATED >90  Non  GFR Calc    --  >90  Non  GFR Calc     < > GFR not calculated, patient <16 years old.   GFRESTBLACK >90   GFR Calc    --  >90   GFR Calc     < > GFR not calculated, patient <16 years old.   POTASSIUM 3.6 3.9 3.4   < > 4.7   TSH  --   --   --   --  1.78    < > = values in this interval not displayed.      BP Readings from Last 3 Encounters:   08/14/19 110/80   12/03/18 110/70   08/27/18 112/68    Wt  "Readings from Last 3 Encounters:   08/14/19 63.6 kg (140 lb 4.8 oz)   12/03/18 65.5 kg (144 lb 8 oz)   08/27/18 63.6 kg (140 lb 4.8 oz)                    Reviewed and updated as needed this visit by Provider  Tobacco  Allergies  Meds  Problems  Med Hx  Surg Hx  Fam Hx         Review of Systems   ROS COMP: Constitutional, HEENT, cardiovascular, pulmonary, GI, , musculoskeletal, neuro, skin, endocrine and psych systems are negative, except as otherwise noted.      Objective    /80 (BP Location: Right arm, Patient Position: Sitting, Cuff Size: Adult Regular)   Pulse 85   Temp 96.9  F (36.1  C) (Tympanic)   Resp 12   Ht 1.854 m (6' 1\")   Wt 63.6 kg (140 lb 4.8 oz)   SpO2 98%   BMI 18.51 kg/m    Body mass index is 18.51 kg/m .  Physical Exam   GENERAL: healthy, alert and no distress  PSYCH: mentation appears normal, affect normal/bright, anxious and judgement and insight intact    Diagnostic Test Results:  Labs reviewed in Epic        Assessment & Plan     1. Anxiety  The risks, benefits and treatment options of prescribed medications or other treatments have been discussed with the patient. The patient verbalized their understanding and should call or follow up if no improvement or if they develop further problems.    - escitalopram (LEXAPRO) 10 MG tablet; Take 0.5 tablets (5 mg) by mouth daily for 14 days, THEN 1 tablet (10 mg) daily.  Dispense: 30 tablet; Refill: 1    2. Major depressive disorder, single episode, mild (H)   Worsened.   Stop Fluoxetine - discussed that patient has had difficulty taking this twice daily and does not feel that this has been helpful.    Recommended counseling as well - given Ale SULLIVAN [facility]'s information.    - escitalopram (LEXAPRO) 10 MG tablet; Take 0.5 tablets (5 mg) by mouth daily for 14 days, THEN 1 tablet (10 mg) daily.  Dispense: 30 tablet; Refill: 1    3. Irritability and anger     - escitalopram (LEXAPRO) 10 MG tablet; Take 0.5 tablets (5 mg) by mouth daily " for 14 days, THEN 1 tablet (10 mg) daily.  Dispense: 30 tablet; Refill: 1       Patient Instructions   Possible side effects of antidepressants/anxiety meds, including but not limited to GI upset, disrupted sleep, loss of libido, worsening of mood or even possible risk of increased suicidal thoughts.   Often some of these things if not severe will improve after 1-2 weeks on medications but some may not see effects for 3-4 weeks,  if tolerable patients should continue meds and see if there is improvement.  If symptoms are intolerable or for any suicidal thoughts the medication should be stopped immediately and contact the clinic.      These medications should be used for 6-9 months before stopping, to avoid rebound symptoms.   Contact the clinic if having any problems tolerating these medications.  Take the medication daily and do not stop the medication abruptly.    Follow up in one month to discuss improvement and whether or not we need to change meds or increase dose.  Follow up sooner if problems.      Please plan to contact the clinic or 24 hour nurse line at any time if you are having any thoughts of self harm.    GUNNAR Reilly        Return in about 4 weeks (around 9/11/2019) for Medication Follow up, Recheck symptoms.    Smiley Rivera NP  [facility]

## 2019-08-15 ASSESSMENT — ANXIETY QUESTIONNAIRES: GAD7 TOTAL SCORE: 10

## 2019-11-27 ENCOUNTER — OFFICE VISIT (OUTPATIENT)
Dept: FAMILY MEDICINE | Facility: CLINIC | Age: 19
End: 2019-11-27
Payer: COMMERCIAL

## 2019-11-27 VITALS
RESPIRATION RATE: 16 BRPM | OXYGEN SATURATION: 98 % | HEART RATE: 98 BPM | SYSTOLIC BLOOD PRESSURE: 118 MMHG | TEMPERATURE: 97.2 F | DIASTOLIC BLOOD PRESSURE: 76 MMHG | BODY MASS INDEX: 19.18 KG/M2 | WEIGHT: 145.4 LBS

## 2019-11-27 DIAGNOSIS — J01.90 ACUTE SINUSITIS WITH SYMPTOMS > 10 DAYS: Primary | ICD-10-CM

## 2019-11-27 PROCEDURE — 99203 OFFICE O/P NEW LOW 30 MIN: CPT | Performed by: INTERNAL MEDICINE

## 2019-11-27 NOTE — LETTER
[facility]  [address] [address]  Phone: [phone]    November 27, 2019        Carlyle Archibald  [address] [facility] [address]          To whom it may concern:    RE: Carlyle Archibald    Patient was seen and treated today at our clinic and missed work from 11/18/19-11/27/19.  Patient may return to work Monday, December 2nd, 2019 with the following:  No restrictions    Please contact me for questions or concerns.      Sincerely,        Bello Torres MD

## 2019-11-27 NOTE — PROGRESS NOTES
Subjective     Carlyle Archibald is a 19 year old male who presents to clinic today for the following health issues:    HPI   Acute Illness   Acute illness concerns: fever, cough, weakness, etc. Patient needs a work note. 11/18 through 11/27.  Onset: x 1.5 week.    Fever: YES- 100.9 highest earlier this week    Chills/Sweats: YES    Headache (location?): YES- little bit at the beginning.    Sinus Pressure:YES- little bit, maxillary more so but some frontal as well    Conjunctivitis:  YES: bilateral- burning    Ear Pain: no    Rhinorrhea: YES    Congestion: YES    Sore Throat: YES- scratchy, can swallow fine     Cough: YES-productive of green sputum    Wheeze: YES- little bit    Decreased Appetite: YES    Nausea: YES    Vomiting: YES- last last week.    Diarrhea:  YES- off and on for most of last week    Dysuria/Freq.: no    Fatigue/Achiness: YES- both    Sick/Strep Exposure: YES- possibly work. Not sure.     Therapies Tried and outcome: ibuprofen and cough drops- not helping.    He is on Humira for Crohn's       Patient Active Problem List   Diagnosis     Personal history of disease     Osgood-Schlatter's disease     GERD (gastroesophageal reflux disease)     Crohn's disease (H)     Perianal abscess     Substance abuse (H)     Major depressive disorder, single episode, mild (H)     Anxiety     Past Surgical History:   Procedure Laterality Date     COLONOSCOPY  1/17/2014    Procedure: COMBINED COLONOSCOPY, SINGLE BIOPSY/POLYPECTOMY BY BIOPSY;  Colonoscopy;  Surgeon: Una Alcazar MD;  Location: [facility]     TONSILLECTOMY & ADENOIDECTOMY  about 1 1/1 yo       Social History     Tobacco Use     Smoking status: Never Smoker     Smokeless tobacco: Never Used   Substance Use Topics     Alcohol use: No     Family History   Problem Relation Age of Onset     Allergies Mother      Lipids Mother      Allergies Father      Gastrointestinal Disease Father         Crohn's disease     Lipids Maternal Grandmother       Cancer Maternal Grandmother      Lipids Maternal Grandfather      Cancer Maternal Grandfather      Diabetes Paternal Grandfather      Cancer Paternal Grandmother      Anxiety Disorder Sister          Current Outpatient Medications   Medication Sig Dispense Refill     adalimumab (HUMIRA) 40 MG/0.8ML prefilled syringe kit Inject 40 mg Subcutaneous every 14 days       amoxicillin-clavulanate (AUGMENTIN) 875-125 MG tablet Take 1 tablet by mouth 2 times daily for 7 days 14 tablet 0     escitalopram (LEXAPRO) 10 MG tablet Take 0.5 tablets (5 mg) by mouth daily for 14 days, THEN 1 tablet (10 mg) daily. 30 tablet 1     Acetaminophen (TYLENOL PO) Take 1,000 mg by mouth every 6 hours as needed for mild pain or fever       No Known Allergies    Reviewed and updated as needed this visit by Provider         Review of Systems   ROS COMP: Constitutional, HEENT, pulmonary systems are negative, except as otherwise noted.      Objective    /76   Pulse 98   Temp 97.2  F (36.2  C) (Tympanic)   Resp 16   Wt 66 kg (145 lb 6.4 oz)   SpO2 98%   BMI 19.18 kg/m    Body mass index is 19.18 kg/m .  Physical Exam     GENERAL: alert and no distress, appears fatigued  EYES: Eyes grossly normal to inspection, PERRL and conjunctivae and sclerae normal  HENT: ear canals and TMs normal, bilateral maxillary sinuses are tender to percussion, nose and mouth without ulcers or lesions, oropharynx normal  NECK: no adenopathy, no asymmetry, masses, or scars  RESP: lungs clear to auscultation - no rales, rhonchi or wheezes  CV: regular rate and rhythm, normal S1 S2, no S3 or S4, no murmur, click or rub     Diagnostic Test Results:  Labs reviewed in Epic  No results found for this or any previous visit (from the past 24 hour(s)).        Assessment & Plan     1. Acute sinusitis with symptoms > 10 days    Carlyle presents with 10 days of both URI and GI symptoms, though the latter is improving.  He has pain to percussion of the maxillary sinuses on  exam today concerning for sinusitis, possibly bacterial given the duration. Will treat with Augmentin- he has tolerated this in the past from what he recalls.  Continue OTCs.  Follow-up as needed if not improving in a week or new/worsening symptoms develop.       - amoxicillin-clavulanate (AUGMENTIN) 875-125 MG tablet; Take 1 tablet by mouth 2 times daily for 7 days  Dispense: 14 tablet; Refill: 0           Return in about 1 week (around 12/4/2019) for if not improving.    Bello Torres MD  [facility]

## 2019-12-05 ENCOUNTER — TELEPHONE (OUTPATIENT)
Dept: FAMILY MEDICINE | Facility: CLINIC | Age: 19
End: 2019-12-05

## 2019-12-05 DIAGNOSIS — F32.0 MAJOR DEPRESSIVE DISORDER, SINGLE EPISODE, MILD (H): ICD-10-CM

## 2019-12-05 DIAGNOSIS — F19.10 SUBSTANCE ABUSE (H): Primary | ICD-10-CM

## 2019-12-05 NOTE — TELEPHONE ENCOUNTER
Smiley,    Mother of the patient is asking for a mental health referral.  I have pended for your approval Baylee LARA RN

## 2019-12-06 NOTE — TELEPHONE ENCOUNTER
Message given to patient with good understanding.  Gave phone number of [facility].  Ana Rosa Mcdaniels on 12/6/2019 at 8:19 AM

## 2019-12-19 ENCOUNTER — OFFICE VISIT (OUTPATIENT)
Dept: BEHAVIORAL HEALTH | Facility: CLINIC | Age: 19
End: 2019-12-19
Payer: COMMERCIAL

## 2019-12-19 DIAGNOSIS — F32.0 MAJOR DEPRESSIVE DISORDER, SINGLE EPISODE, MILD (H): ICD-10-CM

## 2019-12-19 DIAGNOSIS — F41.1 GAD (GENERALIZED ANXIETY DISORDER): Primary | ICD-10-CM

## 2019-12-19 PROCEDURE — 90834 PSYTX W PT 45 MINUTES: CPT | Performed by: SOCIAL WORKER

## 2019-12-19 ASSESSMENT — ANXIETY QUESTIONNAIRES
5. BEING SO RESTLESS THAT IT IS HARD TO SIT STILL: MORE THAN HALF THE DAYS
GAD7 TOTAL SCORE: 11
6. BECOMING EASILY ANNOYED OR IRRITABLE: NEARLY EVERY DAY
IF YOU CHECKED OFF ANY PROBLEMS ON THIS QUESTIONNAIRE, HOW DIFFICULT HAVE THESE PROBLEMS MADE IT FOR YOU TO DO YOUR WORK, TAKE CARE OF THINGS AT HOME, OR GET ALONG WITH OTHER PEOPLE: VERY DIFFICULT
1. FEELING NERVOUS, ANXIOUS, OR ON EDGE: SEVERAL DAYS
7. FEELING AFRAID AS IF SOMETHING AWFUL MIGHT HAPPEN: NOT AT ALL
2. NOT BEING ABLE TO STOP OR CONTROL WORRYING: MORE THAN HALF THE DAYS
3. WORRYING TOO MUCH ABOUT DIFFERENT THINGS: MORE THAN HALF THE DAYS

## 2019-12-19 ASSESSMENT — PATIENT HEALTH QUESTIONNAIRE - PHQ9
SUM OF ALL RESPONSES TO PHQ QUESTIONS 1-9: 18
5. POOR APPETITE OR OVEREATING: SEVERAL DAYS

## 2019-12-20 ASSESSMENT — ANXIETY QUESTIONNAIRES: GAD7 TOTAL SCORE: 11

## 2019-12-29 NOTE — PROGRESS NOTES
[facility] Primary Care: Integrated Behavioral Health  December 19, 2019      Behavioral Health Clinician Progress Note    Patient Name: Carlyle Archibald           Service Type: Individual      Service Location:  in clinic      Session Start Time: 2:40 PM  session End Time: 3:30 PM      Session Length: 38 - 52      Attendees: Patient    Visit Activities (Refresh list every visit): NEW and [facility] Only    Diagnostic Assessment Date: Not completed  Treatment Plan Review Date: Not completed  See Flowsheets for today's PHQ-9 and CLARITZA-7 results  Previous PHQ-9:   PHQ-9 SCORE 6/14/2019 8/14/2019 12/19/2019   PHQ-9 Total Score - - -   PHQ-9 Total Score MyChart - - -   PHQ-9 Total Score 9 17 18     Previous CLARITZA-7:   CLARITZA-7 SCORE 6/14/2019 8/14/2019 12/19/2019   Total Score - - -   Total Score 8 10 11       SHREYA LEVEL:  No flowsheet data found.    DATA  Extended Session (60+ minutes): No  Interactive Complexity: No  Crisis: No    Treatment Objective(s) Addressed in This Session:  Target Behavior(s): disease management/lifestyle changes Decrease depression and anxiety    Depressed Mood: Increase interest, engagement, and pleasure in doing things  Decrease frequency and intensity of feeling down, depressed, hopeless  Improve quantity and quality of night time sleep / decrease daytime naps  Feel less tired and more energy during the day   Improve diet, appetite, mindful eating, and / or meal planning  Identify negative self-talk and behaviors: challenge core beliefs, myths, and actions  Improve concentration, focus, and mindfulness in daily activities   Anxiety: will experience a reduction in anxiety, will develop more effective coping skills to manage anxiety symptoms, will develop healthy cognitive patterns and beliefs and will increase ability to function adaptively  Functional Impairment: will effectively address problems that interfere with adaptive functioning    Current Stressors / Issues:  First session with  patient.  He was referred by primary care provider and his father.  Patient reports increased depression with increased anxiety due to a loss of the relationship.  Patient reports having therapy for this about 6 months ago.  Patient reports his ex-girlfriend has started to contact him and he does not know if this means she wants to get back together.  Discussed boundaries-obtaining and maintaining boundaries.  Did not complete DA due to time constraints.  Patient will continue medication compliance and see this writer as needed.      Progress on Treatment Objective(s) / Homework:  None established    Motivational Interviewing    MI Intervention: Expressed Empathy/Understanding, Supported Autonomy, Collaboration, Evocation, Open-ended questions, Reflections: simple and complex, Change talk (evoked) and Reframe     Change Talk Expressed by the Patient: Desire to change Reasons to change Need to change    Provider Response to Change Talk: E - Evoked more info from patient about behavior change, A - Affirmed patient's thoughts, decisions, or attempts at behavior change, R - Reflected patient's change talk and S - Summarized patient's change talk statements      Care Plan review completed: No    Medication Review:  No changes to current psychiatric medication(s)    Medication Compliance:  Yes    Changes in Health Issues:   None reported    Chemical Use Review:   Substance Use: Chemical use reviewed, no active concerns identified      Tobacco Use: No current tobacco use.      Assessment: Current Emotional / Mental Status (status of significant symptoms):  Risk status (Self / Other harm or suicidal ideation)  Patient denies a history of suicidal ideation, suicide attempts, self-injurious behavior, homicidal ideation, homicidal behavior and and other safety concerns  Patient denies current fears or concerns for personal safety.  Patient denies current or recent suicidal ideation or behaviors.  Patient denies current or  recent homicidal ideation or behaviors.  Patient denies current or recent self injurious behavior or ideation.  Patient denies other safety concerns.  A safety and risk management plan has not been developed at this time, however patient was encouraged to call [place] [phone] should there be a change in any of these risk factors.    Appearance:   Appropriate   Eye Contact:   Fair   Psychomotor Behavior: Normal  Restless   Attitude:   Cooperative   Orientation:   All  Speech   Rate / Production: Normal    Volume:  Normal   Mood:    Anxious  Normal Sad   Affect:    Appropriate  Subdued   Thought Content:  Clear   Thought Form:  Coherent  Logical   Insight:    Good     Diagnoses:  1. CLARITZA (generalized anxiety disorder)    2. Major depressive disorder, single episode, mild (H)        Collateral Reports Completed:  Communicated with: Primary care provider as needed    Plan: (Homework, other):  Patient was given information about behavioral services and encouraged to schedule a follow up appointment with the clinic [facility] as needed.  He was also given information about mental health symptoms and treatment options .  CD Recommendations: No indications of CD issues.  VANESSA Cox (Mindy),[facility]

## 2020-01-11 ENCOUNTER — HOSPITAL ENCOUNTER (EMERGENCY)
Facility: CLINIC | Age: 20
Discharge: HOME | End: 2020-01-11
Attending: FAMILY MEDICINE | Admitting: FAMILY MEDICINE
Payer: COMMERCIAL

## 2020-01-11 VITALS
HEART RATE: 112 BPM | DIASTOLIC BLOOD PRESSURE: 78 MMHG | TEMPERATURE: 101.7 F | BODY MASS INDEX: 18.96 KG/M2 | OXYGEN SATURATION: 95 % | RESPIRATION RATE: 16 BRPM | SYSTOLIC BLOOD PRESSURE: 118 MMHG | WEIGHT: 140 LBS | HEIGHT: 72 IN

## 2020-01-11 DIAGNOSIS — J02.9 PHARYNGITIS, UNSPECIFIED ETIOLOGY: ICD-10-CM

## 2020-01-11 LAB
DEPRECATED S PYO AG THROAT QL EIA: NORMAL
SPECIMEN SOURCE: NORMAL

## 2020-01-11 PROCEDURE — 99283 EMERGENCY DEPT VISIT LOW MDM: CPT

## 2020-01-11 PROCEDURE — 25000132 ZZH RX MED GY IP 250 OP 250 PS 637: Performed by: FAMILY MEDICINE

## 2020-01-11 PROCEDURE — 87880 STREP A ASSAY W/OPTIC: CPT | Performed by: FAMILY MEDICINE

## 2020-01-11 PROCEDURE — 99284 EMERGENCY DEPT VISIT MOD MDM: CPT | Mod: Z6 | Performed by: FAMILY MEDICINE

## 2020-01-11 PROCEDURE — 87081 CULTURE SCREEN ONLY: CPT | Performed by: FAMILY MEDICINE

## 2020-01-11 PROCEDURE — 25000125 ZZHC RX 250: Performed by: FAMILY MEDICINE

## 2020-01-11 RX ORDER — ACETAMINOPHEN 325 MG/1
975 TABLET ORAL ONCE
Status: COMPLETED | OUTPATIENT
Start: 2020-01-11 | End: 2020-01-11

## 2020-01-11 RX ORDER — DEXAMETHASONE SODIUM PHOSPHATE 4 MG/ML
10 VIAL (ML) INJECTION ONCE
Status: COMPLETED | OUTPATIENT
Start: 2020-01-11 | End: 2020-01-11

## 2020-01-11 RX ADMIN — ACETAMINOPHEN 975 MG: 325 TABLET, FILM COATED ORAL at 20:56

## 2020-01-11 RX ADMIN — DEXAMETHASONE SODIUM PHOSPHATE 10 MG: 4 INJECTION, SOLUTION INTRAMUSCULAR; INTRAVENOUS at 21:33

## 2020-01-11 ASSESSMENT — MIFFLIN-ST. JEOR: SCORE: 1678.04

## 2020-01-11 NOTE — ED AVS SNAPSHOT
[facility]  [address] [address]  Phone:  [phone]  Fax:  [phone]                                    Carlyle Archibald   MRN: [id number]    Department:  [facility]   Date of Visit:  1/11/2020           After Visit Summary Signature Page    I have received my discharge instructions, and my questions have been answered. I have discussed any challenges I see with this plan with the nurse or doctor.    ..........................................................................................................................................  Patient/Patient Representative Signature      ..........................................................................................................................................  Patient Representative Print Name and Relationship to Patient    ..................................................               ................................................  Date                                   Time    ..........................................................................................................................................  Reviewed by Signature/Title    ...................................................              ..............................................  Date                                               Time          22EPIC Rev 08/18

## 2020-01-12 NOTE — ED PROVIDER NOTES
HPI   The patient is a 19-year-old male presenting with sore throat.  Symptoms began this yesterday.  His pain is severe.  It is exacerbated with swallowing.  He denies any significant coughing.  He denies rhinorrhea or congestion.  No chest congestion.  No shortness of breath or chest pain.  No nausea or vomiting.  He has had a fever since yesterday as well.  No ear pain.  No dental pain.  No neck pain or stiffness.  No headache.  No facial pain or pressure.        Allergies:  No Known Allergies  Problem List:    Patient Active Problem List    Diagnosis Date Noted     Anxiety 03/27/2017     Priority: Medium     Substance abuse (H) 11/08/2016     Priority: Medium     Major depressive disorder, single episode, mild (H) 11/08/2016     Priority: Medium     Perianal abscess 02/04/2014     Priority: Medium     Crohn's disease (H) 01/25/2014     Priority: Medium     - Diagnosed: 1/2014, at age: 13 years     - Location (visual):    Ileocolonic (L3)                                                                 Unknown (EGD was not performed):                                         Upper GI disease proximal to ligament of Treitz (L4a)                               Upper GI disease distal to ligament of Treitz  and proximal to distal 1/3 ileum (L4b)    - Extent (histopath): TI, Entire Colon    - Behavior:     Non-stricturing, non-penetrating (B1)                            Perianal (p)    - Growth:       No evidence of growth delay (G0)                            - TPMT phenotype:     Normal 27.1  [id number]  - IBD7:                            Not done    - PPD status:      2/2014: pending    - Extraintestinal manifestations: None (erythema nodosum, pyoderma gangrenosum, arthritis, arthralgia, fevers, PSC, others.)    - Previous IBD-related medications and reasons for their discontinuation:    - Immunization status: Fully Immunized for age  - Varicella, measles, mumps IgG status: Unknown, Positive titers: 2/2014:  pending    - Eye exam:                date and result    - Previous abdominal surgeries: dates  - Previous admissions for IBD: dates       GERD (gastroesophageal reflux disease) 01/07/2014     Priority: Medium     Osgood-Schlatter's disease 09/10/2012     Priority: Medium     Left knee       Personal history of disease 09/08/2006     Priority: Medium     Problem list name updated by automated process. Provider to review        Past Medical History:    Past Medical History:   Diagnosis Date     Closed fracture of unspecified part of humerus 8/2005     GN IgG mesangial       Meningitis, unspecified(322.9)      Past Surgical History:    Past Surgical History:   Procedure Laterality Date     COLONOSCOPY  1/17/2014    Procedure: COMBINED COLONOSCOPY, SINGLE BIOPSY/POLYPECTOMY BY BIOPSY;  Colonoscopy;  Surgeon: Una Alcazar MD;  Location: [facility]     TONSILLECTOMY & ADENOIDECTOMY  about 1 1/1 yo     Family History:    Family History   Problem Relation Age of Onset     Allergies Mother      Lipids Mother      Allergies Father      Gastrointestinal Disease Father         Crohn's disease     Lipids Maternal Grandmother      Cancer Maternal Grandmother      Lipids Maternal Grandfather      Cancer Maternal Grandfather      Diabetes Paternal Grandfather      Cancer Paternal Grandmother      Anxiety Disorder Sister      Social History:  Marital Status:  Single [1]  Social History     Tobacco Use     Smoking status: Never Smoker     Smokeless tobacco: Never Used   Substance Use Topics     Alcohol use: No     Drug use: No      Medications:    Acetaminophen (TYLENOL PO)  adalimumab (HUMIRA) 40 MG/0.8ML prefilled syringe kit  escitalopram (LEXAPRO) 10 MG tablet      Review of Systems   All other systems reviewed and are negative.      PE   BP: 116/82  Heart Rate: 117  Temp: 101.7  F (38.7  C)  Resp: 16  Height: 182.9 cm (6')  Weight: 63.5 kg (140 lb)  SpO2: 97 %  Physical Exam  Vitals signs and nursing note reviewed.    Constitutional:       General: He is in acute distress.      Appearance: He is not diaphoretic.      Comments: The patient has obvious discomfort.  He will grimace when swallowing.  He talks without difficulty.  Her voice sounds normal.  No drooling.  Handling secretions well.   HENT:      Head: Atraumatic.      Mouth/Throat:      Comments: The posterior oropharynx is erythematous.  The uvula is midline.  The peritonsillar pillars are normal.  He may have some exudate on the left within the palatine tonsil space.  Eyes:      General: No scleral icterus.     Pupils: Pupils are equal, round, and reactive to light.   Neck:      Musculoskeletal: Normal range of motion.   Cardiovascular:      Rate and Rhythm: Normal rate.   Pulmonary:      Effort: Pulmonary effort is normal. No respiratory distress.   Abdominal:      Palpations: Abdomen is soft.   Musculoskeletal: Normal range of motion.         General: No tenderness.   Skin:     General: Skin is warm.      Findings: No rash.   Neurological:      Mental Status: He is alert and oriented to person, place, and time.   Psychiatric:         Behavior: Behavior normal.         ED COURSE and [facility]   2114.  The patient has a sore throat since yesterday.  Low concern for influenza-like illness as he is without other typical symptoms.  This appears to be pharyngitis of some etiology.  Strep test pending.  If positive, antibiotics will be given.  If negative, ibuprofen and Tylenol will be recommended.    LABS  Labs Ordered and Resulted from Time of ED Arrival Up to the Time of Departure from the ED   RAPID STREP SCREEN   BETA STREP GROUP A CULTURE       IMAGING  Images reviewed by me.  Radiology report also reviewed.  No orders to display       Procedures    Medications   dexamethasone (DECADRON) oral solution (inj used orally) 10 mg (has no administration in time range)   acetaminophen (TYLENOL) tablet 975 mg (975 mg Oral Given 1/11/20 2056)         IMPRESSION       ICD-10-CM     1. Pharyngitis, unspecified etiology J02.9             Medication List      ASK your doctor about these medications    amoxicillin-clavulanate 875-125 MG tablet  Commonly known as:  Augmentin  1 tablet, Oral, 2 TIMES DAILY  Ask about: Should I take this medication?                          Bobo Zabala MD  01/11/20 [id number]

## 2020-01-12 NOTE — DISCHARGE INSTRUCTIONS
Return to the Emergency Room if the following occurs:     Severely worsened pain, dehydration, or for any concern at anytime.    Or, follow-up with the following provider as we discussed:     Return to your primary doctor as needed, or if not improved over the next 7 days.    Medications discussed:    Ibuprofen 600 mg every six hours for pain (7 days duration).  Tylenol 1000 mg every six hours for pain (7 days duration).  Therefore, you can alternate these every three hours and do it safely.    If you received pain-relieving or sedating medication during your time in the ER, avoid alcohol, driving automobiles, or working with machinery.  Also, a responsible adult must stay with you.        Call the Nurse Advice Line at [phone] or [phone] for any concern at anytime.

## 2020-01-12 NOTE — ED NOTES
Pt presents to ED with complaints of fever, sore throat, cough and generalized weakness.  Pt did not take any medication for fever today.  RN administered tylenol per protocol. Pt VSS.  Pt not in any respiratory distress. Reports current ecig use.  A&Ox4.

## 2020-01-14 LAB
BACTERIA SPEC CULT: NORMAL
Lab: NORMAL
SPECIMEN SOURCE: NORMAL

## 2020-03-05 ENCOUNTER — TRANSFERRED RECORDS (OUTPATIENT)
Dept: HEALTH INFORMATION MANAGEMENT | Facility: CLINIC | Age: 20
End: 2020-03-05

## 2020-03-05 LAB
ALT SERPL-CCNC: 19 U/L (ref 0–78)
AST SERPL-CCNC: 17 U/L (ref 0–37)

## 2020-03-12 ENCOUNTER — TRANSFERRED RECORDS (OUTPATIENT)
Dept: HEALTH INFORMATION MANAGEMENT | Facility: CLINIC | Age: 20
End: 2020-03-12

## 2020-05-11 ENCOUNTER — TRANSFERRED RECORDS (OUTPATIENT)
Dept: HEALTH INFORMATION MANAGEMENT | Facility: CLINIC | Age: 20
End: 2020-05-11

## 2020-08-06 ENCOUNTER — MEDICAL CORRESPONDENCE (OUTPATIENT)
Dept: HEALTH INFORMATION MANAGEMENT | Facility: CLINIC | Age: 20
End: 2020-08-06

## 2020-08-12 DIAGNOSIS — K50.80 REGIONAL ENTERITIS OF SMALL INTESTINE WITH LARGE INTESTINE (H): ICD-10-CM

## 2020-08-12 DIAGNOSIS — K50.80 CROHN'S DISEASE OF SMALL AND LARGE INTESTINES (H): Primary | ICD-10-CM

## 2020-08-28 ENCOUNTER — APPOINTMENT (OUTPATIENT)
Dept: CT IMAGING | Facility: CLINIC | Age: 20
End: 2020-08-28
Attending: EMERGENCY MEDICINE
Payer: COMMERCIAL

## 2020-08-28 ENCOUNTER — HOSPITAL ENCOUNTER (EMERGENCY)
Facility: CLINIC | Age: 20
Discharge: HOME | End: 2020-08-29
Attending: EMERGENCY MEDICINE | Admitting: EMERGENCY MEDICINE
Payer: COMMERCIAL

## 2020-08-28 DIAGNOSIS — L03.818 CELLULITIS OF OTHER SPECIFIED SITE: ICD-10-CM

## 2020-08-28 LAB
ALBUMIN SERPL-MCNC: 3.5 G/DL (ref 3.4–5)
ALP SERPL-CCNC: 61 U/L (ref 40–150)
ALT SERPL W P-5'-P-CCNC: 15 U/L (ref 0–70)
ANION GAP SERPL CALCULATED.3IONS-SCNC: 4 MMOL/L (ref 3–14)
AST SERPL W P-5'-P-CCNC: 13 U/L (ref 0–45)
BASOPHILS # BLD AUTO: 0.1 10E9/L (ref 0–0.2)
BASOPHILS NFR BLD AUTO: 0.4 %
BILIRUB SERPL-MCNC: 0.8 MG/DL (ref 0.2–1.3)
BUN SERPL-MCNC: 8 MG/DL (ref 7–30)
CALCIUM SERPL-MCNC: 9.3 MG/DL (ref 8.5–10.1)
CHLORIDE SERPL-SCNC: 105 MMOL/L (ref 94–109)
CO2 SERPL-SCNC: 29 MMOL/L (ref 20–32)
CREAT SERPL-MCNC: 1.02 MG/DL (ref 0.66–1.25)
DIFFERENTIAL METHOD BLD: ABNORMAL
EOSINOPHIL # BLD AUTO: 0.2 10E9/L (ref 0–0.7)
EOSINOPHIL NFR BLD AUTO: 1.3 %
ERYTHROCYTE [DISTWIDTH] IN BLOOD BY AUTOMATED COUNT: 17.9 % (ref 10–15)
GFR SERPL CREATININE-BSD FRML MDRD: >90 ML/MIN/{1.73_M2}
GLUCOSE SERPL-MCNC: 74 MG/DL (ref 70–99)
HCT VFR BLD AUTO: 52.2 % (ref 40–53)
HGB BLD-MCNC: 17 G/DL (ref 13.3–17.7)
IMM GRANULOCYTES # BLD: 0.1 10E9/L (ref 0–0.4)
IMM GRANULOCYTES NFR BLD: 0.4 %
LYMPHOCYTES # BLD AUTO: 2.6 10E9/L (ref 0.8–5.3)
LYMPHOCYTES NFR BLD AUTO: 19.3 %
MCH RBC QN AUTO: 28.9 PG (ref 26.5–33)
MCHC RBC AUTO-ENTMCNC: 32.6 G/DL (ref 31.5–36.5)
MCV RBC AUTO: 89 FL (ref 78–100)
MONOCYTES # BLD AUTO: 1.2 10E9/L (ref 0–1.3)
MONOCYTES NFR BLD AUTO: 9.1 %
NEUTROPHILS # BLD AUTO: 9.4 10E9/L (ref 1.6–8.3)
NEUTROPHILS NFR BLD AUTO: 69.5 %
NRBC # BLD AUTO: 0 10*3/UL
NRBC BLD AUTO-RTO: 0 /100
PLATELET # BLD AUTO: 275 10E9/L (ref 150–450)
POTASSIUM SERPL-SCNC: 3.9 MMOL/L (ref 3.4–5.3)
PROT SERPL-MCNC: 7.9 G/DL (ref 6.8–8.8)
RBC # BLD AUTO: 5.88 10E12/L (ref 4.4–5.9)
SODIUM SERPL-SCNC: 138 MMOL/L (ref 133–144)
WBC # BLD AUTO: 13.5 10E9/L (ref 4–11)

## 2020-08-28 PROCEDURE — 25000128 H RX IP 250 OP 636: Performed by: EMERGENCY MEDICINE

## 2020-08-28 PROCEDURE — 85025 COMPLETE CBC W/AUTO DIFF WBC: CPT | Performed by: EMERGENCY MEDICINE

## 2020-08-28 PROCEDURE — 80053 COMPREHEN METABOLIC PANEL: CPT | Performed by: EMERGENCY MEDICINE

## 2020-08-28 PROCEDURE — 74177 CT ABD & PELVIS W/CONTRAST: CPT

## 2020-08-28 PROCEDURE — 99284 EMERGENCY DEPT VISIT MOD MDM: CPT | Mod: Z6 | Performed by: EMERGENCY MEDICINE

## 2020-08-28 PROCEDURE — 99285 EMERGENCY DEPT VISIT HI MDM: CPT | Mod: 25 | Performed by: EMERGENCY MEDICINE

## 2020-08-28 RX ORDER — IOPAMIDOL 755 MG/ML
71 INJECTION, SOLUTION INTRAVASCULAR ONCE
Status: COMPLETED | OUTPATIENT
Start: 2020-08-28 | End: 2020-08-28

## 2020-08-28 RX ADMIN — IOPAMIDOL 71 ML: 755 INJECTION, SOLUTION INTRAVENOUS at 23:47

## 2020-08-28 ASSESSMENT — MIFFLIN-ST. JEOR: SCORE: 1711.6

## 2020-08-28 NOTE — ED AVS SNAPSHOT
[facility]  [address] [facility] [address] [address]  Phone:  [phone]  Fax:  [phone]                                    Carlyle Archibald   MRN: [id number]    Department:  [facility]   Date of Visit:  8/28/2020           After Visit Summary Signature Page    I have received my discharge instructions, and my questions have been answered. I have discussed any challenges I see with this plan with the nurse or doctor.    ..........................................................................................................................................  Patient/Patient Representative Signature      ..........................................................................................................................................  Patient Representative Print Name and Relationship to Patient    ..................................................               ................................................  Date                                   Time    ..........................................................................................................................................  Reviewed by Signature/Title    ...................................................              ..............................................  Date                                               Time          22EPIC Rev 08/18

## 2020-08-29 VITALS
SYSTOLIC BLOOD PRESSURE: 127 MMHG | HEART RATE: 59 BPM | BODY MASS INDEX: 19.22 KG/M2 | TEMPERATURE: 98 F | WEIGHT: 145 LBS | DIASTOLIC BLOOD PRESSURE: 83 MMHG | RESPIRATION RATE: 16 BRPM | OXYGEN SATURATION: 98 % | HEIGHT: 73 IN

## 2020-08-29 PROCEDURE — 25000132 ZZH RX MED GY IP 250 OP 250 PS 637: Performed by: EMERGENCY MEDICINE

## 2020-08-29 RX ORDER — CLINDAMYCIN HCL 150 MG
450 CAPSULE ORAL ONCE
Status: COMPLETED | OUTPATIENT
Start: 2020-08-29 | End: 2020-08-29

## 2020-08-29 RX ORDER — CLINDAMYCIN HCL 150 MG
450 CAPSULE ORAL 3 TIMES DAILY
Qty: 90 CAPSULE | Refills: 0 | Status: SHIPPED | OUTPATIENT
Start: 2020-08-29 | End: 2020-09-08

## 2020-08-29 RX ADMIN — AMOXICILLIN AND CLAVULANATE POTASSIUM 1 TABLET: 875; 125 TABLET, FILM COATED ORAL at 00:44

## 2020-08-29 RX ADMIN — CLINDAMYCIN HYDROCHLORIDE 450 MG: 150 CAPSULE ORAL at 00:43

## 2020-08-29 ASSESSMENT — ENCOUNTER SYMPTOMS
SHORTNESS OF BREATH: 0
FATIGUE: 0
BACK PAIN: 0
ABDOMINAL PAIN: 0
WOUND: 1
CHEST TIGHTNESS: 0
FEVER: 0
DYSURIA: 0
COUGH: 0
HEADACHES: 0
CHILLS: 0
RECTAL PAIN: 1
LIGHT-HEADEDNESS: 0

## 2020-08-29 NOTE — ED PROVIDER NOTES
History     Chief Complaint   Patient presents with     Rectal Bleeding     fistula in rectum  HX of Crohns  has HX of Fistula      HPI  Carlyle Archibald is a 20 year old male with a history of Crohn's disease currently under control with Humira presenting for evaluation of swelling and pain around his rectum and posterior perineum concerning for possible fistula.  Patient reports he has had discomfort for about a week with progressive swelling.  Today developed some drainage from the area which he reports was severely malodorous and was of moderate volume.  He also had some bleeding from the area.  Denies any fever or chills.  Denies any change in bowel habits.  Denies change in appetite.  Denies abdominal pain symptoms.  Has a follow-up appointment with his gastroenterologist scheduled for next Wednesday but due to the increasing pain and drainage today, came in for further evaluation of his pain.    Allergies:  No Known Allergies    Problem List:    Patient Active Problem List    Diagnosis Date Noted     Anxiety 03/27/2017     Priority: Medium     Substance abuse (H) 11/08/2016     Priority: Medium     Major depressive disorder, single episode, mild (H) 11/08/2016     Priority: Medium     Perianal abscess 02/04/2014     Priority: Medium     Crohn's disease (H) 01/25/2014     Priority: Medium     - Diagnosed: 1/2014, at age: 13 years     - Location (visual):    Ileocolonic (L3)                                                                 Unknown (EGD was not performed):                                         Upper GI disease proximal to ligament of Treitz (L4a)                               Upper GI disease distal to ligament of Treitz  and proximal to distal 1/3 ileum (L4b)    - Extent (histopath): TI, Entire Colon    - Behavior:     Non-stricturing, non-penetrating (B1)                            Perianal (p)    - Growth:       No evidence of growth delay (G0)                            - TPMT phenotype:      Normal 27.1  [id number]  - IBD7:                            Not done    - PPD status:      2/2014: pending    - Extraintestinal manifestations: None (erythema nodosum, pyoderma gangrenosum, arthritis, arthralgia, fevers, PSC, others.)    - Previous IBD-related medications and reasons for their discontinuation:    - Immunization status: Fully Immunized for age  - Varicella, measles, mumps IgG status: Unknown, Positive titers: 2/2014: pending    - Eye exam:                date and result    - Previous abdominal surgeries: dates  - Previous admissions for IBD: dates       GERD (gastroesophageal reflux disease) 01/07/2014     Priority: Medium     Osgood-Schlatter's disease 09/10/2012     Priority: Medium     Left knee       Personal history of disease 09/08/2006     Priority: Medium     Problem list name updated by automated process. Provider to review          Past Medical History:    Past Medical History:   Diagnosis Date     Closed fracture of unspecified part of humerus 8/2005     GN IgG mesangial       Meningitis, unspecified(322.9)        Past Surgical History:    Past Surgical History:   Procedure Laterality Date     COLONOSCOPY  1/17/2014    Procedure: COMBINED COLONOSCOPY, SINGLE BIOPSY/POLYPECTOMY BY BIOPSY;  Colonoscopy;  Surgeon: Una Alcazar MD;  Location: [facility]     TONSILLECTOMY & ADENOIDECTOMY  about 1 1/3 yo       Family History:    Family History   Problem Relation Age of Onset     Allergies Mother      Lipids Mother      Allergies Father      Gastrointestinal Disease Father         Crohn's disease     Lipids Maternal Grandmother      Cancer Maternal Grandmother      Lipids Maternal Grandfather      Cancer Maternal Grandfather      Diabetes Paternal Grandfather      Cancer Paternal Grandmother      Anxiety Disorder Sister        Social History:  Marital Status:  Single [1]  Social History     Tobacco Use     Smoking status: Never Smoker     Smokeless tobacco: Never Used   Substance Use  "Topics     Alcohol use: No     Drug use: No        Medications:    amoxicillin-clavulanate (AUGMENTIN) 875-125 MG tablet  clindamycin (CLEOCIN) 150 MG capsule  Acetaminophen (TYLENOL PO)  adalimumab (HUMIRA) 40 MG/0.8ML prefilled syringe kit  escitalopram (LEXAPRO) 10 MG tablet          Review of Systems   Constitutional: Negative for chills, fatigue and fever.   HENT: Negative for congestion.    Respiratory: Negative for cough, chest tightness and shortness of breath.    Cardiovascular: Negative for chest pain.   Gastrointestinal: Positive for rectal pain. Negative for abdominal pain.   Genitourinary: Negative for decreased urine volume and dysuria.   Musculoskeletal: Negative for back pain.   Skin: Positive for wound. Negative for rash.   Neurological: Negative for light-headedness and headaches.   All other systems reviewed and are negative.      Physical Exam   BP: 131/85  Pulse: 70  Temp: 98  F (36.7  C)  Resp: 20  Height: 185.4 cm (6' 1\")  Weight: 65.8 kg (145 lb)  SpO2: 96 %      Physical Exam  Vitals signs and nursing note reviewed.   Constitutional:       Appearance: Normal appearance. He is not ill-appearing or diaphoretic.   HENT:      Head: Atraumatic.      Nose: Nose normal.      Mouth/Throat:      Mouth: Mucous membranes are moist.   Eyes:      Conjunctiva/sclera: Conjunctivae normal.   Cardiovascular:      Rate and Rhythm: Normal rate.      Pulses: Normal pulses.   Pulmonary:      Effort: Pulmonary effort is normal.   Abdominal:      General: Abdomen is flat.      Palpations: Abdomen is soft.      Tenderness: There is no abdominal tenderness.   Genitourinary:      Skin:     General: Skin is warm and dry.      Capillary Refill: Capillary refill takes less than 2 seconds.   Neurological:      Mental Status: He is alert and oriented to person, place, and time.   Psychiatric:         Mood and Affect: Mood normal.         ED Course        Procedures                   Results for orders placed or performed " during the hospital encounter of 08/28/20 (from the past 24 hour(s))   CBC with platelets differential   Result Value Ref Range    WBC 13.5 (H) 4.0 - 11.0 10e9/L    RBC Count 5.88 4.4 - 5.9 10e12/L    Hemoglobin 17.0 13.3 - 17.7 g/dL    Hematocrit 52.2 40.0 - 53.0 %    MCV 89 78 - 100 fl    MCH 28.9 26.5 - 33.0 pg    MCHC 32.6 31.5 - 36.5 g/dL    RDW 17.9 (H) 10.0 - 15.0 %    Platelet Count 275 150 - 450 10e9/L    Diff Method Automated Method     % Neutrophils 69.5 %    % Lymphocytes 19.3 %    % Monocytes 9.1 %    % Eosinophils 1.3 %    % Basophils 0.4 %    % Immature Granulocytes 0.4 %    Nucleated RBCs 0 0 /100    Absolute Neutrophil 9.4 (H) 1.6 - 8.3 10e9/L    Absolute Lymphocytes 2.6 0.8 - 5.3 10e9/L    Absolute Monocytes 1.2 0.0 - 1.3 10e9/L    Absolute Eosinophils 0.2 0.0 - 0.7 10e9/L    Absolute Basophils 0.1 0.0 - 0.2 10e9/L    Abs Immature Granulocytes 0.1 0 - 0.4 10e9/L    Absolute Nucleated RBC 0.0    Comprehensive metabolic panel   Result Value Ref Range    Sodium 138 133 - 144 mmol/L    Potassium 3.9 3.4 - 5.3 mmol/L    Chloride 105 94 - 109 mmol/L    Carbon Dioxide 29 20 - 32 mmol/L    Anion Gap 4 3 - 14 mmol/L    Glucose 74 70 - 99 mg/dL    Urea Nitrogen 8 7 - 30 mg/dL    Creatinine 1.02 0.66 - 1.25 mg/dL    GFR Estimate >90 >60 mL/min/[1.73_m2]    GFR Estimate If Black >90 >60 mL/min/[1.73_m2]    Calcium 9.3 8.5 - 10.1 mg/dL    Bilirubin Total 0.8 0.2 - 1.3 mg/dL    Albumin 3.5 3.4 - 5.0 g/dL    Protein Total 7.9 6.8 - 8.8 g/dL    Alkaline Phosphatase 61 40 - 150 U/L    ALT 15 0 - 70 U/L    AST 13 0 - 45 U/L   CT Abdomen Pelvis w Contrast    Narrative    EXAM: CT ABDOMEN PELVIS W CONTRAST  LOCATION: [facility]  DATE/TIME: 8/28/2020 11:46 PM    INDICATION: Crohn's disease. Perirectal abscess or fistula.  COMPARISON: 04/21/2016.  TECHNIQUE: CT scan of the abdomen and pelvis was performed following injection of IV contrast. Multiplanar reformats were obtained. Dose reduction techniques  were used.  CONTRAST: 71 ml Isovue 370    FINDINGS:   LOWER CHEST: Normal.    HEPATOBILIARY: Normal.    PANCREAS: Normal.    SPLEEN: Normal.    ADRENAL GLANDS: Normal.    KIDNEYS/BLADDER: Normal.    BOWEL: Though imaging is not continued through the entire perineum, there is abnormal soft tissue and inflammation in the left anterior perianal fat measuring approximately 3.5 x 1.8 cm. No focal fluid collection is evident to suggest abscess. No   intrapelvic extension. There is wall thickening of the terminal ileum without surrounding inflammation. There is a 4.5 cm segment of small bowel intussusception in the left abdomen. No bowel obstruction. No free intraperitoneal gas or fluid.    LYMPH NODES: Normal.    VASCULATURE: Unremarkable.    PELVIC ORGANS: Normal.    MUSCULOSKELETAL: Normal.      Impression    IMPRESSION:   1.  Abnormal soft tissue and edema in the fat of the left anterior perianal space without focal fluid collection. The perineum is not imaged in its entirety. No intrapelvic extension or evidence of fistula.  2.  Wall thickening of the terminal ileum without surrounding inflammation is likely secondary to Crohn's disease.  3.  There is a short segment of small bowel intussusception in left abdomen. This is likely transient as there is no bowel obstruction.       Medications   iohexol (OMNIPAQUE) solution 25 mL (25 mLs Oral Given 8/28/20 2305)   iopamidol (ISOVUE-370) solution 71 mL (71 mLs Intravenous Given 8/28/20 2347)   sodium chloride (PF) 0.9% PF flush  mL (100 mLs Intravenous Given 8/28/20 2347)   amoxicillin-clavulanate (AUGMENTIN) 875-125 MG per tablet 1 tablet (1 tablet Oral Given 8/29/20 0044)   clindamycin (CLEOCIN) capsule 450 mg (450 mg Oral Given 8/29/20 0043)       Assessments & Plan (with Medical Decision Making)  20-year-old male with history of Crohn's disease controlled with Humira presenting for evaluation of perirectal pain, swelling, and drainage today.  Symptoms have been  progressively worsening over the past week   And included a significant amount of reported drainage of purulent, malodorous fluid today.  On exam he has a localized area of tenderness and erythema with notable induration but no fluctuance.  Area is most prominent around the perirectal area raising concern for possible fistula formation due to his Crohn's.  No active evidence of Dayday's gangrene at this time.  Patient nontoxic-appearing with normal vital signs.  Labs and CT imaging obtained.  Mild white count of 13.5 with 9.4 neutrophils.  Normal electrolytes.  CT showed no evidence of fistula formation but did show an area of cellulitis without focal fluid collection to suggest abscess.  Given the absence of fistula or abscess, recommend starting on broad-spectrum dual coverage antibiotics.  Patient has follow-up scheduled with his gastroenterologist next Wednesday.  Patient counseled on return precautions if he develops any increasing pain, swelling, fevers, chills, or any other concerning symptoms.     I have reviewed the nursing notes.    I have reviewed the findings, diagnosis, plan and need for follow up with the patient.       New Prescriptions    AMOXICILLIN-CLAVULANATE (AUGMENTIN) 875-125 MG TABLET    Take 1 tablet by mouth 2 times daily for 10 days    CLINDAMYCIN (CLEOCIN) 150 MG CAPSULE    Take 3 capsules (450 mg) by mouth 3 times daily for 10 days       Final diagnoses:   Cellulitis of other specified site - perirectal       8/28/2020   [facility] EMERGENCY DEPARTMENT     Christopher, Andrei Fleming MD  08/29/20 0054

## 2020-08-29 NOTE — ED NOTES
Pt presents to ED with complaints of rectal bleeding.  Hx of chrons and a fistula.  States that it ruptured and that is why he is here, as everything is more uncomfortable.  Pt is afebrile.  A&Ox4.  VSS.

## 2020-09-02 ENCOUNTER — TRANSFERRED RECORDS (OUTPATIENT)
Dept: HEALTH INFORMATION MANAGEMENT | Facility: CLINIC | Age: 20
End: 2020-09-02

## 2020-09-15 ENCOUNTER — HOSPITAL ENCOUNTER (OUTPATIENT)
Dept: MRI IMAGING | Facility: CLINIC | Age: 20
Discharge: HOME | End: 2020-09-15
Attending: PHYSICIAN ASSISTANT | Admitting: PHYSICIAN ASSISTANT
Payer: COMMERCIAL

## 2020-09-15 DIAGNOSIS — K50.813 CROHN'S DISEASE OF BOTH SMALL AND LARGE INTESTINE WITH FISTULA (H): ICD-10-CM

## 2020-09-15 PROCEDURE — 25500064 ZZH RX 255 OP 636: Performed by: PHYSICIAN ASSISTANT

## 2020-09-15 PROCEDURE — A9585 GADOBUTROL INJECTION: HCPCS | Performed by: PHYSICIAN ASSISTANT

## 2020-09-15 PROCEDURE — 25800030 ZZH RX IP 258 OP 636: Performed by: PHYSICIAN ASSISTANT

## 2020-09-15 PROCEDURE — 72197 MRI PELVIS W/O & W/DYE: CPT

## 2020-09-15 RX ORDER — GADOBUTROL 604.72 MG/ML
6.5 INJECTION INTRAVENOUS ONCE
Status: COMPLETED | OUTPATIENT
Start: 2020-09-15 | End: 2020-09-15

## 2020-09-15 RX ADMIN — GADOBUTROL 6.5 ML: 604.72 INJECTION INTRAVENOUS at 18:02

## 2020-09-15 RX ADMIN — SODIUM CHLORIDE 50 ML: 9 INJECTION, SOLUTION INTRAVENOUS at 18:01

## 2020-09-23 ENCOUNTER — TRANSFERRED RECORDS (OUTPATIENT)
Dept: HEALTH INFORMATION MANAGEMENT | Facility: CLINIC | Age: 20
End: 2020-09-23

## 2020-10-20 ENCOUNTER — VIRTUAL VISIT (OUTPATIENT)
Dept: FAMILY MEDICINE | Facility: OTHER | Age: 20
End: 2020-10-20
Payer: COMMERCIAL

## 2020-10-20 PROCEDURE — 99421 OL DIG E/M SVC 5-10 MIN: CPT | Performed by: INTERNAL MEDICINE

## 2020-10-21 NOTE — PROGRESS NOTES
"Date: 10/20/2020 21:14:27  Clinician: Anne-Marie Soliman  Clinician NPI: [id number]  Patient: Carlyle Archibald  Patient : 2000  Patient Address: [address] [place] [address]  Patient Phone: [phone]  Visit Protocol: URI  Patient Summary:  Carlyle is a 20 year old ( : 2000 ) male who initiated a [facility] Visit for COVID-19 (Coronavirus) evaluation and screening. When asked the question \"Please sign me up to receive news, health information and promotions from [facility].\", Carlyle responded \"No\".    Carlyle states his symptoms started gradually 3-4 days ago.   His symptoms consist of rhinitis.   Symptom details   Nasal secretions: The color of his mucus is clear.   Carlyle denies having ear pain, headache, wheezing, fever, cough, nasal congestion, anosmia, vomiting, nausea, facial pain or pressure, myalgias, chills, malaise, sore throat, teeth pain, ageusia, and diarrhea. He also denies double sickening (worsening symptoms after initial improvement), taking antibiotic medication in the past month, and having recent facial or sinus surgery in the past 60 days. He is not experiencing dyspnea.    Pertinent COVID-19 (Coronavirus) information  In the past 14 days, Carlyle has not worked in a congregate living setting.   He does not work or volunteer as healthcare worker or a  and does not work or volunteer in a healthcare facility.   Carlyle also has not lived in a congregate living setting in the past 14 days. He does not live with a healthcare worker.   Carlyle has had a close contact with a laboratory-confirmed COVID-19 patient within 14 days of symptom onset. Additional information about contact with COVID-19 (Coronavirus) patient as reported by the patient (free text): Exposed to sister -   Since 2019, Carlyle and has not had upper respiratory infection or influenza-like illness. Has not been diagnosed with lab-confirmed COVID-19 test   Pertinent medical history  Carlyle needs " a return to work/school note.   Weight: 145 lbs   Carlyle does not smoke or use smokeless tobacco.   Additional information as reported by the patient (free text): Jun also says his eyes are sore   Weight: 145 lbs    MEDICATIONS: No current medications, ALLERGIES: NKDA  Clinician Response:  Dear Carlyle,   Your symptoms show that you may have coronavirus (COVID-19). This illness can cause fever, cough and trouble breathing. Many people get a mild case and get better on their own. Some people can get very sick.  What should I do?  We would like to test you for this virus.   1. Please call [phone] to schedule your visit. Explain that you were referred by [facility] to have a COVID-19 test. Be ready to share your [facility] visit ID number.  The following will serve as your written order for this COVID Test, ordered by me, for the indication of suspected COVID [Z20.828]: The test will be ordered in [place], our electronic health record, after you are scheduled. It will show as ordered and authorized by Joaquin Monsalve MD.  Order: COVID-19 (Coronavirus) PCR for SYMPTOMATIC testing from [facility].      2. When it's time for your COVID test:  Stay at least 6 feet away from others. (If someone will drive you to your test, stay in the backseat, as far away from the  as you can.)   Cover your mouth and nose with a mask, tissue or washcloth.  Go straight to the testing site. Don't make any stops on the way there or back.      3.Starting now: Stay home and away from others (self-isolate) until:   You've had no fever---and no medicine that reduces fever---for one full day (24 hours). And...   Your other symptoms have gotten better. For example, your cough or breathing has improved. And...   At least 10 days have passed since your symptoms started.       During this time, don't leave the house except for testing or medical care.   Stay in your own room, even for meals. Use your own bathroom if you can.   Stay away from others in your  "home. No hugging, kissing or shaking hands. No visitors.  Don't go to work, school or anywhere else.    Clean \"high touch\" surfaces often (doorknobs, counters, handles, etc.). Use a household cleaning spray or wipes. You'll find a full list of  on the EPA website: www.epa.gov/pesticide-registration/list-n-disinfectants-use-against-sars-cov-2.   Cover your mouth and nose with a mask, tissue or washcloth to avoid spreading germs.  Wash your hands and face often. Use soap and water.  Caregivers in these groups are at risk for severe illness due to COVID-19:  o People 65 years and older  o People who live in a nursing home or long-term care facility  o People with chronic disease (lung, heart, cancer, diabetes, kidney, liver, immunologic)  o People who have a weakened immune system, including those who:   Are in cancer treatment  Take medicine that weakens the immune system, such as corticosteroids  Had a bone marrow or organ transplant  Have an immune deficiency  Have poorly controlled HIV or AIDS  Are obese (body mass index of 40 or higher)  Smoke regularly   o Caregivers should wear gloves while washing dishes, handling laundry and cleaning bedrooms and bathrooms.  o Use caution when washing and drying laundry: Don't shake dirty laundry, and use the warmest water setting that you can.  o For more tips, go to www.cdc.gov/coronavirus/2019-ncov/downloads/10Things.pdf.  How can I take care of myself?   Get lots of rest. Drink extra fluids (unless a doctor has told you not to).   Take Tylenol (acetaminophen) for fever or pain. If you have liver or kidney problems, ask your family doctor if it's okay to take Tylenol.   Adults can take either:    650 mg (two 325 mg pills) every 4 to 6 hours, or...   1,000 mg (two 500 mg pills) every 8 hours as needed.    Note: Don't take more than 3,000 mg in one day. Acetaminophen is found in many medicines (both prescribed and over-the-counter medicines). Read all labels to be " sure you don't take too much.   For children, check the Tylenol bottle for the right dose. The dose is based on the child's age or weight.    If you have other health problems (like cancer, heart failure, an organ transplant or severe kidney disease): Call your specialty clinic if you don't feel better in the next 2 days.       Know when to call 911. Emergency warning signs include:    Trouble breathing or shortness of breath Pain or pressure in the chest that doesn't go away Feeling confused like you haven't felt before, or not being able to wake up Bluish-colored lips or face.  Where can I get more information?   [facility] -- About COVID-19: [email or web address]   CDC -- What to Do If You're Sick: www.cdc.gov/coronavirus/2019-ncov/about/steps-when-sick.html   [facility] -- Ending Home Isolation: www.cdc.gov/coronavirus/2019-ncov/hcp/disposition-in-home-patients.html   [facility] -- Caring for Someone: www.cdc.gov/coronavirus/2019-ncov/if-you-are-sick/care-for-someone.html   [facility] -- Interim Guidance for Hospital Discharge to Home: www.health.[facility].[place]/diseases/coronavirus/hcp/hospdischarge.pdf   [facility] clinical trials (COVID-19 research studies): clinicalaffairs.[facility]-clinical-trials    Below are the COVID-19 hotlines at the [facility] of [facility] ([facility]). Interpreters are available.    For health questions: Call 148-108-5640 or 1-489.972.9253 (7 a.m. to 7 p.m.) For questions about schools and childcare: Call 884-736-8683 or 1-530.653.7482 (7 a.m. to 7 p.m.)    Diagnosis: Contact with and (suspected) exposure to other viral communicable diseases  Diagnosis ICD: Z20.828

## 2020-10-25 ENCOUNTER — APPOINTMENT (OUTPATIENT)
Dept: FAMILY MEDICINE | Facility: CLINIC | Age: 20
End: 2020-10-25
Payer: COMMERCIAL

## 2021-01-13 ENCOUNTER — HOSPITAL ENCOUNTER (EMERGENCY)
Facility: CLINIC | Age: 21
Discharge: HOME | End: 2021-01-13
Attending: FAMILY MEDICINE | Admitting: FAMILY MEDICINE
Payer: COMMERCIAL

## 2021-01-13 ENCOUNTER — APPOINTMENT (OUTPATIENT)
Dept: CT IMAGING | Facility: CLINIC | Age: 21
End: 2021-01-13
Attending: FAMILY MEDICINE
Payer: COMMERCIAL

## 2021-01-13 VITALS
TEMPERATURE: 97.7 F | RESPIRATION RATE: 16 BRPM | HEART RATE: 68 BPM | DIASTOLIC BLOOD PRESSURE: 70 MMHG | WEIGHT: 148 LBS | BODY MASS INDEX: 19.61 KG/M2 | HEIGHT: 73 IN | OXYGEN SATURATION: 95 % | SYSTOLIC BLOOD PRESSURE: 113 MMHG

## 2021-01-13 DIAGNOSIS — K60.30 PERIANAL FISTULA DUE TO CROHN'S DISEASE (H): ICD-10-CM

## 2021-01-13 DIAGNOSIS — K50.913 PERIANAL FISTULA DUE TO CROHN'S DISEASE (H): ICD-10-CM

## 2021-01-13 DIAGNOSIS — K61.0 PERIANAL CELLULITIS: ICD-10-CM

## 2021-01-13 LAB
ALBUMIN SERPL-MCNC: 3.6 G/DL (ref 3.4–5)
ALP SERPL-CCNC: 42 U/L (ref 40–150)
ALT SERPL W P-5'-P-CCNC: 17 U/L (ref 0–70)
ANION GAP SERPL CALCULATED.3IONS-SCNC: 4 MMOL/L (ref 3–14)
AST SERPL W P-5'-P-CCNC: 19 U/L (ref 0–45)
BASOPHILS # BLD AUTO: 0 10E9/L (ref 0–0.2)
BASOPHILS NFR BLD AUTO: 0.4 %
BILIRUB SERPL-MCNC: 0.6 MG/DL (ref 0.2–1.3)
BUN SERPL-MCNC: 8 MG/DL (ref 7–30)
CALCIUM SERPL-MCNC: 9.5 MG/DL (ref 8.5–10.1)
CHLORIDE SERPL-SCNC: 105 MMOL/L (ref 94–109)
CO2 SERPL-SCNC: 27 MMOL/L (ref 20–32)
CREAT SERPL-MCNC: 1 MG/DL (ref 0.66–1.25)
CRP SERPL-MCNC: 4.3 MG/L (ref 0–8)
DIFFERENTIAL METHOD BLD: ABNORMAL
EOSINOPHIL # BLD AUTO: 0.1 10E9/L (ref 0–0.7)
EOSINOPHIL NFR BLD AUTO: 1.2 %
ERYTHROCYTE [DISTWIDTH] IN BLOOD BY AUTOMATED COUNT: 13.5 % (ref 10–15)
GFR SERPL CREATININE-BSD FRML MDRD: >90 ML/MIN/{1.73_M2}
GLUCOSE SERPL-MCNC: 85 MG/DL (ref 70–99)
HCT VFR BLD AUTO: 48.5 % (ref 40–53)
HGB BLD-MCNC: 16.4 G/DL (ref 13.3–17.7)
IMM GRANULOCYTES # BLD: 0 10E9/L (ref 0–0.4)
IMM GRANULOCYTES NFR BLD: 0.4 %
INR PPP: 0.99 (ref 0.86–1.14)
LACTATE BLD-SCNC: 1.5 MMOL/L (ref 0.7–2)
LIPASE SERPL-CCNC: 56 U/L (ref 73–393)
LYMPHOCYTES # BLD AUTO: 2.4 10E9/L (ref 0.8–5.3)
LYMPHOCYTES NFR BLD AUTO: 21.5 %
MCH RBC QN AUTO: 31.1 PG (ref 26.5–33)
MCHC RBC AUTO-ENTMCNC: 33.8 G/DL (ref 31.5–36.5)
MCV RBC AUTO: 92 FL (ref 78–100)
MONOCYTES # BLD AUTO: 1 10E9/L (ref 0–1.3)
MONOCYTES NFR BLD AUTO: 8.9 %
NEUTROPHILS # BLD AUTO: 7.6 10E9/L (ref 1.6–8.3)
NEUTROPHILS NFR BLD AUTO: 67.6 %
NRBC # BLD AUTO: 0 10*3/UL
NRBC BLD AUTO-RTO: 0 /100
PLATELET # BLD AUTO: 252 10E9/L (ref 150–450)
POTASSIUM SERPL-SCNC: 3.7 MMOL/L (ref 3.4–5.3)
PROT SERPL-MCNC: 7.5 G/DL (ref 6.8–8.8)
RBC # BLD AUTO: 5.28 10E12/L (ref 4.4–5.9)
SODIUM SERPL-SCNC: 136 MMOL/L (ref 133–144)
WBC # BLD AUTO: 11.2 10E9/L (ref 4–11)

## 2021-01-13 PROCEDURE — 74177 CT ABD & PELVIS W/CONTRAST: CPT

## 2021-01-13 PROCEDURE — 96375 TX/PRO/DX INJ NEW DRUG ADDON: CPT | Performed by: FAMILY MEDICINE

## 2021-01-13 PROCEDURE — 86140 C-REACTIVE PROTEIN: CPT | Performed by: FAMILY MEDICINE

## 2021-01-13 PROCEDURE — 99285 EMERGENCY DEPT VISIT HI MDM: CPT | Mod: 25 | Performed by: FAMILY MEDICINE

## 2021-01-13 PROCEDURE — 96367 TX/PROPH/DG ADDL SEQ IV INF: CPT | Performed by: FAMILY MEDICINE

## 2021-01-13 PROCEDURE — 99285 EMERGENCY DEPT VISIT HI MDM: CPT | Performed by: FAMILY MEDICINE

## 2021-01-13 PROCEDURE — 83605 ASSAY OF LACTIC ACID: CPT | Performed by: FAMILY MEDICINE

## 2021-01-13 PROCEDURE — 85610 PROTHROMBIN TIME: CPT | Performed by: FAMILY MEDICINE

## 2021-01-13 PROCEDURE — 250N000011 HC RX IP 250 OP 636: Performed by: FAMILY MEDICINE

## 2021-01-13 PROCEDURE — 80053 COMPREHEN METABOLIC PANEL: CPT | Performed by: FAMILY MEDICINE

## 2021-01-13 PROCEDURE — 250N000009 HC RX 250: Performed by: FAMILY MEDICINE

## 2021-01-13 PROCEDURE — 258N000003 HC RX IP 258 OP 636: Performed by: FAMILY MEDICINE

## 2021-01-13 PROCEDURE — 96365 THER/PROPH/DIAG IV INF INIT: CPT | Mod: 59 | Performed by: FAMILY MEDICINE

## 2021-01-13 PROCEDURE — 85025 COMPLETE CBC W/AUTO DIFF WBC: CPT | Performed by: FAMILY MEDICINE

## 2021-01-13 PROCEDURE — 83690 ASSAY OF LIPASE: CPT | Performed by: FAMILY MEDICINE

## 2021-01-13 RX ORDER — ONDANSETRON 2 MG/ML
4 INJECTION INTRAMUSCULAR; INTRAVENOUS ONCE
Status: COMPLETED | OUTPATIENT
Start: 2021-01-13 | End: 2021-01-13

## 2021-01-13 RX ORDER — IOPAMIDOL 755 MG/ML
72 INJECTION, SOLUTION INTRAVASCULAR ONCE
Status: COMPLETED | OUTPATIENT
Start: 2021-01-13 | End: 2021-01-13

## 2021-01-13 RX ORDER — CIPROFLOXACIN 2 MG/ML
400 INJECTION, SOLUTION INTRAVENOUS ONCE
Status: COMPLETED | OUTPATIENT
Start: 2021-01-13 | End: 2021-01-13

## 2021-01-13 RX ORDER — OXYCODONE HYDROCHLORIDE 5 MG/1
5 TABLET ORAL EVERY 6 HOURS PRN
Qty: 12 TABLET | Refills: 0 | Status: SHIPPED | OUTPATIENT
Start: 2021-01-13 | End: 2021-02-16

## 2021-01-13 RX ORDER — HYDROMORPHONE HYDROCHLORIDE 1 MG/ML
0.5 INJECTION, SOLUTION INTRAMUSCULAR; INTRAVENOUS; SUBCUTANEOUS
Status: DISCONTINUED | OUTPATIENT
Start: 2021-01-13 | End: 2021-01-14 | Stop reason: HOSPADM

## 2021-01-13 RX ORDER — SODIUM CHLORIDE, SODIUM LACTATE, POTASSIUM CHLORIDE, CALCIUM CHLORIDE 600; 310; 30; 20 MG/100ML; MG/100ML; MG/100ML; MG/100ML
INJECTION, SOLUTION INTRAVENOUS CONTINUOUS
Status: DISCONTINUED | OUTPATIENT
Start: 2021-01-13 | End: 2021-01-14 | Stop reason: HOSPADM

## 2021-01-13 RX ORDER — METRONIDAZOLE 500 MG/1
500 TABLET ORAL 4 TIMES DAILY
Qty: 40 TABLET | Refills: 0 | Status: SHIPPED | OUTPATIENT
Start: 2021-01-13 | End: 2021-02-03

## 2021-01-13 RX ORDER — AMPICILLIN AND SULBACTAM 2; 1 G/1; G/1
3 INJECTION, POWDER, FOR SOLUTION INTRAMUSCULAR; INTRAVENOUS ONCE
Status: COMPLETED | OUTPATIENT
Start: 2021-01-13 | End: 2021-01-13

## 2021-01-13 RX ADMIN — METRONIDAZOLE 500 MG: 500 INJECTION, SOLUTION INTRAVENOUS at 20:49

## 2021-01-13 RX ADMIN — ONDANSETRON 4 MG: 2 INJECTION INTRAMUSCULAR; INTRAVENOUS at 18:07

## 2021-01-13 RX ADMIN — HYDROMORPHONE HYDROCHLORIDE 0.5 MG: 1 INJECTION, SOLUTION INTRAMUSCULAR; INTRAVENOUS; SUBCUTANEOUS at 18:08

## 2021-01-13 RX ADMIN — AMPICILLIN SODIUM AND SULBACTAM SODIUM 3 G: 2; 1 INJECTION, POWDER, FOR SOLUTION INTRAMUSCULAR; INTRAVENOUS at 18:34

## 2021-01-13 RX ADMIN — SODIUM CHLORIDE 58 ML: 9 INJECTION, SOLUTION INTRAVENOUS at 18:46

## 2021-01-13 RX ADMIN — IOPAMIDOL 72 ML: 755 INJECTION, SOLUTION INTRAVENOUS at 18:46

## 2021-01-13 RX ADMIN — CIPROFLOXACIN 400 MG: 2 INJECTION, SOLUTION INTRAVENOUS at 19:54

## 2021-01-13 RX ADMIN — SODIUM CHLORIDE, POTASSIUM CHLORIDE, SODIUM LACTATE AND CALCIUM CHLORIDE 1000 ML: 600; 310; 30; 20 INJECTION, SOLUTION INTRAVENOUS at 18:00

## 2021-01-13 ASSESSMENT — MIFFLIN-ST. JEOR: SCORE: 1725.2

## 2021-01-13 NOTE — ED NOTES
Pt reports history of rectal fistula and today noticed small amount of bleeding after working today with pain to while sitting.

## 2021-01-13 NOTE — ED PROVIDER NOTES
"  History     Chief Complaint   Patient presents with     Rectal/perineal Pain     was seen in august for the same thing, dx with a cellulitis, got better with abx and never followed-up with GI. hx of fistual. pain in rectum started today, had some bleeding and pus from the area when he wiped     HPI  Carlyle Archibald is a 20 year old male, past medical history significant for anxiety, substance abuse, depression, perianal abscess, Crohn's disease diagnosed age 13, GERD, Osgood-Schlatter's disease, presents to emergency department with concerns of perianal pain, bleeding and purulent discharge.  History is obtained from the patient who states that he had an episode like that when he is having right now back in August when he was here (I reviewed this visit and his evaluation as well as the proposed follow-up outpatient with his GI medicine physician which he has not done) which was treated with antibiotics and improved.  Even with improvement he states he gets periodic blood or pus when wiping from bowel movements which are generally loose and have not changed in consistently recently.  Occasional cramping abdominal pain but nothing out of the ordinary for him.  He states that he does not really notice a fullness or pain but rather appreciates that once it \"seems to rupture\" releasing a large quantity of pus and blood as it did at about 4:00 this afternoon for about 2 hours prior to ED presentation.  He then feels a sense of relief and increased comfort.  He presents because he was concerned not so much with pain discomfort or the discharge but because when this happened last time it was infected and he required significant double coverage with antibiotics to resolve the issue.  He denied fever chills or sweats.  No nausea or vomiting.  He has not taken any pain medication.      Allergies:  No Known Allergies    Problem List:    Patient Active Problem List    Diagnosis Date Noted     Anxiety 03/27/2017     Priority: " Medium     Substance abuse (H) 11/08/2016     Priority: Medium     Major depressive disorder, single episode, mild (H) 11/08/2016     Priority: Medium     Perianal abscess 02/04/2014     Priority: Medium     Crohn's disease (H) 01/25/2014     Priority: Medium     - Diagnosed: 1/2014, at age: 13 years     - Location (visual):    Ileocolonic (L3)                                                                 Unknown (EGD was not performed):                                         Upper GI disease proximal to ligament of Treitz (L4a)                               Upper GI disease distal to ligament of Treitz  and proximal to distal 1/3 ileum (L4b)    - Extent (histopath): TI, Entire Colon    - Behavior:     Non-stricturing, non-penetrating (B1)                            Perianal (p)    - Growth:       No evidence of growth delay (G0)                            - TPMT phenotype:     Normal 27.1  [id number]  - IBD7:                            Not done    - PPD status:      2/2014: pending    - Extraintestinal manifestations: None (erythema nodosum, pyoderma gangrenosum, arthritis, arthralgia, fevers, PSC, others.)    - Previous IBD-related medications and reasons for their discontinuation:    - Immunization status: Fully Immunized for age  - Varicella, measles, mumps IgG status: Unknown, Positive titers: 2/2014: pending    - Eye exam:                date and result    - Previous abdominal surgeries: dates  - Previous admissions for IBD: dates       GERD (gastroesophageal reflux disease) 01/07/2014     Priority: Medium     Osgood-Schlatter's disease 09/10/2012     Priority: Medium     Left knee       Personal history of disease 09/08/2006     Priority: Medium     Problem list name updated by automated process. Provider to review          Past Medical History:    Past Medical History:   Diagnosis Date     Closed fracture of unspecified part of humerus 8/2005     GN IgG mesangial       Meningitis, unspecified(322.9)   "      Past Surgical History:    Past Surgical History:   Procedure Laterality Date     COLONOSCOPY  1/17/2014    Procedure: COMBINED COLONOSCOPY, SINGLE BIOPSY/POLYPECTOMY BY BIOPSY;  Colonoscopy;  Surgeon: Una Alcazar MD;  Location: [facility]     TONSILLECTOMY & ADENOIDECTOMY  about 1 1/1 yo       Family History:    Family History   Problem Relation Age of Onset     Allergies Mother      Lipids Mother      Allergies Father      Gastrointestinal Disease Father         Crohn's disease     Lipids Maternal Grandmother      Cancer Maternal Grandmother      Lipids Maternal Grandfather      Cancer Maternal Grandfather      Diabetes Paternal Grandfather      Cancer Paternal Grandmother      Anxiety Disorder Sister        Social History:  Marital Status:  Single [1]  Social History     Tobacco Use     Smoking status: Never Smoker     Smokeless tobacco: Never Used   Substance Use Topics     Alcohol use: No     Drug use: No        Medications:         Acetaminophen (TYLENOL PO)       adalimumab (HUMIRA) 40 MG/0.8ML prefilled syringe kit       escitalopram (LEXAPRO) 10 MG tablet          Review of Systems   All other systems reviewed and are negative.      Physical Exam   BP: 114/69  Pulse: 90  Temp: 97.7  F (36.5  C)  Resp: 16  Height: 185.4 cm (6' 1\")  Weight: 67.1 kg (148 lb)(stated)  SpO2: 96 %      Physical Exam  Vitals signs and nursing note reviewed.   Constitutional:       General: He is not in acute distress.     Appearance: Normal appearance. He is normal weight. He is not ill-appearing.   HENT:      Head: Normocephalic and atraumatic.      Right Ear: Tympanic membrane normal.      Left Ear: Tympanic membrane normal.      Nose: Nose normal.      Mouth/Throat:      Mouth: Mucous membranes are moist.      Pharynx: Oropharynx is clear.   Eyes:      Extraocular Movements: Extraocular movements intact.      Conjunctiva/sclera: Conjunctivae normal.      Pupils: Pupils are equal, round, and reactive to light. "   Neck:      Musculoskeletal: Normal range of motion and neck supple.   Cardiovascular:      Rate and Rhythm: Normal rate and regular rhythm.      Pulses: Normal pulses.      Heart sounds: Normal heart sounds.   Pulmonary:      Effort: Pulmonary effort is normal.      Breath sounds: Normal breath sounds.   Abdominal:      General: Bowel sounds are normal.      Palpations: Abdomen is soft.   Genitourinary:      Neurological:      Mental Status: He is alert.         ED Course        Procedures               Critical Care time:  none               Results for orders placed or performed during the hospital encounter of 01/13/21 (from the past 24 hour(s))   CBC with platelets differential   Result Value Ref Range    WBC 11.2 (H) 4.0 - 11.0 10e9/L    RBC Count 5.28 4.4 - 5.9 10e12/L    Hemoglobin 16.4 13.3 - 17.7 g/dL    Hematocrit 48.5 40.0 - 53.0 %    MCV 92 78 - 100 fl    MCH 31.1 26.5 - 33.0 pg    MCHC 33.8 31.5 - 36.5 g/dL    RDW 13.5 10.0 - 15.0 %    Platelet Count 252 150 - 450 10e9/L    Diff Method Automated Method     % Neutrophils 67.6 %    % Lymphocytes 21.5 %    % Monocytes 8.9 %    % Eosinophils 1.2 %    % Basophils 0.4 %    % Immature Granulocytes 0.4 %    Nucleated RBCs 0 0 /100    Absolute Neutrophil 7.6 1.6 - 8.3 10e9/L    Absolute Lymphocytes 2.4 0.8 - 5.3 10e9/L    Absolute Monocytes 1.0 0.0 - 1.3 10e9/L    Absolute Eosinophils 0.1 0.0 - 0.7 10e9/L    Absolute Basophils 0.0 0.0 - 0.2 10e9/L    Abs Immature Granulocytes 0.0 0 - 0.4 10e9/L    Absolute Nucleated RBC 0.0    CRP inflammation   Result Value Ref Range    CRP Inflammation 4.3 0.0 - 8.0 mg/L   INR   Result Value Ref Range    INR 0.99 0.86 - 1.14   Comprehensive metabolic panel   Result Value Ref Range    Sodium 136 133 - 144 mmol/L    Potassium 3.7 3.4 - 5.3 mmol/L    Chloride 105 94 - 109 mmol/L    Carbon Dioxide 27 20 - 32 mmol/L    Anion Gap 4 3 - 14 mmol/L    Glucose 85 70 - 99 mg/dL    Urea Nitrogen 8 7 - 30 mg/dL    Creatinine 1.00 0.66 -  1.25 mg/dL    GFR Estimate >90 >60 mL/min/[1.73_m2]    GFR Estimate If Black >90 >60 mL/min/[1.73_m2]    Calcium 9.5 8.5 - 10.1 mg/dL    Bilirubin Total 0.6 0.2 - 1.3 mg/dL    Albumin 3.6 3.4 - 5.0 g/dL    Protein Total 7.5 6.8 - 8.8 g/dL    Alkaline Phosphatase 42 40 - 150 U/L    ALT 17 0 - 70 U/L    AST 19 0 - 45 U/L   Lipase   Result Value Ref Range    Lipase 56 (L) 73 - 393 U/L   Lactic acid whole blood   Result Value Ref Range    Lactic Acid 1.5 0.7 - 2.0 mmol/L   CT Abdomen Pelvis w Contrast    Narrative    EXAM: CT ABDOMEN PELVIS W CONTRAST  [facility]: [facility]  DATE/TIME: 1/13/2021 6:45 PM    INDICATION: Perianal pain and swelling with bleeding and pus, history of Crohn's and perianal fistula.;  COMPARISON: Pelvic MRI 09/15/2020, CT abdomen pelvis 08/28/2020  TECHNIQUE: CT scan of the abdomen and pelvis was performed following injection of IV contrast. Multiplanar reformats were obtained. Dose reduction techniques were used.  CONTRAST: 72 ml Isovue 370    FINDINGS:   LOWER CHEST: Normal.    HEPATOBILIARY: Normal.    PANCREAS: Normal.    SPLEEN: Normal.    ADRENAL GLANDS: Normal.    KIDNEYS/BLADDER: Normal.    BOWEL: There is mild terminal ileum wall thickening and enhancement similar to prior exam. No evidence for bowel obstruction. No free air, free fluid, or inflammatory change.    There is redemonstration of left perineal abscess which measures approximately 2 cm.    LYMPH NODES: Normal.    VASCULATURE: Unremarkable.    PELVIC ORGANS: Normal.    MUSCULOSKELETAL: Normal.      Impression    IMPRESSION:   1.  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 Overall size at approximately 2 cm is unchanged. No new abscess.  2.  Chronic recurrent mild terminal ileal inflammation.       Medications   lactated ringers BOLUS 1,000 mL (1,000 mLs Intravenous New Bag 1/13/21 1800)     Followed by   lactated ringers infusion (has no administration in  time range)   HYDROmorphone (PF) (DILAUDID) injection 0.5 mg (0.5 mg Intravenous Given 1/13/21 1808)   ciprofloxacin (CIPRO) infusion 400 mg (has no administration in time range)   metroNIDAZOLE (FLAGYL) infusion 500 mg (has no administration in time range)   ampicillin-sulbactam (UNASYN) 3 g vial to attach to  mL bag (0 g Intravenous Stopped 1/13/21 1940)   iopamidol (ISOVUE-370) solution 72 mL (72 mLs Intravenous Given 1/13/21 1846)   sodium chloride 0.9 % bag 500mL for CT scan flush use (58 mLs Intravenous Given 1/13/21 1846)   ondansetron (ZOFRAN) injection 4 mg (4 mg Intravenous Given 1/13/21 1807)   7:48 PM  I reviewed this patient after diagnostics and return with on-call general surgeon Dr. Ramesh Bales.  He felt disposition to home with Cipro and Flagyl would be reasonable and for follow-up in his clinic tomorrow.  Contact information given to the patient to call in the morning for an appointment tomorrow.  The patient is informed of the above discussion, verbalizes intent to comply and will also follow-up with his GI medicine physician as well.    8:57 PM  The patient developed hives in the arm of the IV after administration of Cipro and this infusion was stopped immediately by his nurse.  He did not develop any additional symptoms.  He has already received Unasyn after my initial exam and at this point I will not give him anything further other than the IV Flagyl.  I will change his discharge prescription from Cipro and Flagyl to Augmentin and Flagyl.    Assessments & Plan (with Medical Decision Making)   20-year-old male past medical history reviewed as above who presents for evaluation of discomfort in the perianal area with history of perianal fistula as discussed in the HPI.  On exam the areas around the anus is diffusely indurated likely fistula on the left side as diagrammed, no fluctuance.  Lab diagnostics revealed no significant elevation of white cell count, negative lactic acid and  negative CRP, CT imaging was obtained to evaluate for the possibility of significant abscess , redemonstration of left perianal abscess shown to be a perianal fistula on previous imaging study.  The patient was discussed with on-call general surgery and the recommendations followed as above.  The patient needs to follow-up in clinic normally with general surgery tomorrow as requested but also with his GI medicine physician in the near future.  Discharge to home on Augmentin and Flagyl with surgery follow-up in clinic tomorrow.      Disclaimer: This note consists of symbols derived from keyboarding, dictation and/or voice recognition software. As a result, there may be errors in the script that have gone undetected. Please consider this when interpreting information found in this chart.      I have reviewed the nursing notes.    I have reviewed the findings, diagnosis, plan and need for follow up with the patient.          New Prescriptions    No medications on file       Final diagnoses:   Perianal cellulitis   Perianal fistula due to Crohn's disease (H)       1/13/2021   [facility] EMERGENCY DEPT     Dao Nichole MD  01/16/21 [id number]

## 2021-01-13 NOTE — ED AVS SNAPSHOT
[facility]  [address] [address]  Phone: [phone]  Fax: [phone]                                    Carlyle Archibald   MRN: [id number]    Department: [facility]   Date of Visit: 1/13/2021           After Visit Summary Signature Page    I have received my discharge instructions, and my questions have been answered. I have discussed any challenges I see with this plan with the nurse or doctor.    ..........................................................................................................................................  Patient/Patient Representative Signature      ..........................................................................................................................................  Patient Representative Print Name and Relationship to Patient    ..................................................               ................................................  Date                                   Time    ..........................................................................................................................................  Reviewed by Signature/Title    ...................................................              ..............................................  Date                                               Time          22EPIC Rev 08/18

## 2021-01-14 ENCOUNTER — TELEPHONE (OUTPATIENT)
Dept: SURGERY | Facility: CLINIC | Age: 21
End: 2021-01-14

## 2021-01-14 NOTE — TELEPHONE ENCOUNTER
Reason for Call:  Other appointment    Detailed comments: Patient is calling stating that he was supposed to follow up with a surgen. He was seen in ER 1/13/2021 and has to follow up. Please call patient to schedule.     Phone Number Patient can be reached at: Home number on file [phone] (home)    Best Time: any    Can we leave a detailed message on this number? YES    Call taken on 1/14/2021 at 8:22 AM by Kaitlin Rice

## 2021-01-14 NOTE — DISCHARGE INSTRUCTIONS
Push fluids, rest.  Warm soapy tub soaks 2-3 times a day 20 minutes at a time next couple of days.  Augmentin 875 mg p.o. twice daily x10 days.  Flagyl 500 mg p.o. 4 times daily x10 days.  Call in the a.m. tomorrow morning to general surgery clinic at the number given for an appointment tomorrow with Dr. Ramesh Bales general surgery  Return to the emergency department if worse or changes.  You should have follow-up with your GI medicine physician.

## 2021-01-19 ENCOUNTER — OFFICE VISIT (OUTPATIENT)
Dept: SURGERY | Facility: CLINIC | Age: 21
End: 2021-01-19
Payer: COMMERCIAL

## 2021-01-19 VITALS
TEMPERATURE: 96.8 F | BODY MASS INDEX: 19.61 KG/M2 | SYSTOLIC BLOOD PRESSURE: 107 MMHG | DIASTOLIC BLOOD PRESSURE: 87 MMHG | HEART RATE: 123 BPM | HEIGHT: 73 IN | WEIGHT: 147.93 LBS

## 2021-01-19 DIAGNOSIS — K60.30 FISTULA-IN-ANO: Primary | ICD-10-CM

## 2021-01-19 DIAGNOSIS — K50.813 CROHN'S DISEASE OF BOTH SMALL AND LARGE INTESTINE WITH FISTULA (H): ICD-10-CM

## 2021-01-19 PROCEDURE — 99203 OFFICE O/P NEW LOW 30 MIN: CPT | Performed by: SURGERY

## 2021-01-19 ASSESSMENT — MIFFLIN-ST. JEOR: SCORE: 1724.88

## 2021-01-19 NOTE — LETTER
1/19/2021         RE: Carlyle Archibald  [address] [facility] [address]        Dear Colleague,    Thank you for referring your patient, Carlyle Archibald, to the [facility]. Please see a copy of my visit note below.    Surgical Consultation/History and Physical  [facility]    Carlyle is seen in consultation for perianal abscess, at the request of Smiley Rivera NP.    Chief Complaint:  Perianal abscess    History of Present Illness: Carlyle Archibald is a 20 year old male presents for follow-up after ED with perianal abscess and fistula.  Patient has a history of 2 similar episodes in the past; initial episode treated with I and D in the ED.  His last occurrence was managed with antibiotics alone.  He follows with [facility] for his Crohn's.  He has been on Humira and states he has not been as well controlled since that time.  He has not seen his gastroenterologist for this.     At present he complains of intermittent drainage from the area.  Denies fevers, chills, nausea, vomiting, chest pain or shortness of breath.  He denies any other abdominal pain at this time.  He is unsure of his last colonoscopy.    Patient Active Problem List   Diagnosis     Personal history of disease     Osgood-Schlatter's disease     GERD (gastroesophageal reflux disease)     Crohn's disease (H)     Perianal abscess     Substance abuse (H)     Major depressive disorder, single episode, mild (H)     Anxiety     Past Medical History:   Diagnosis Date     Closed fracture of unspecified part of humerus 8/2005    Broken Left Arm     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     Meningitis, unspecified(322.9)     at 2 mos old, no long term complications       Past Surgical History:   Procedure Laterality Date     COLONOSCOPY  1/17/2014    Procedure: COMBINED COLONOSCOPY, SINGLE BIOPSY/POLYPECTOMY BY BIOPSY;  Colonoscopy;  Surgeon:  "Una Alcazar MD;  Location: [facility]     TONSILLECTOMY & ADENOIDECTOMY  about 1 1/3 yo       Family History   Problem Relation Age of Onset     Allergies Mother      Lipids Mother      Allergies Father      Gastrointestinal Disease Father         Crohn's disease     Lipids Maternal Grandmother      Cancer Maternal Grandmother      Lipids Maternal Grandfather      Cancer Maternal Grandfather      Diabetes Paternal Grandfather      Cancer Paternal Grandmother      Anxiety Disorder Sister        Social History     Tobacco Use     Smoking status: Never Smoker     Smokeless tobacco: Never Used   Substance Use Topics     Alcohol use: No        History   Drug Use No       Current Outpatient Medications   Medication Sig Dispense Refill     Acetaminophen (TYLENOL PO) Take 1,000 mg by mouth every 6 hours as needed for mild pain or fever       adalimumab (HUMIRA) 40 MG/0.8ML prefilled syringe kit Inject 40 mg Subcutaneous every 14 days       amoxicillin-clavulanate (AUGMENTIN) 875-125 MG tablet Take 1 tablet by mouth 2 times daily for 10 days 20 tablet 0     escitalopram (LEXAPRO) 10 MG tablet Take 0.5 tablets (5 mg) by mouth daily for 14 days, THEN 1 tablet (10 mg) daily. 30 tablet 1     metroNIDAZOLE (FLAGYL) 500 MG tablet Take 1 tablet (500 mg) by mouth 4 times daily for 10 days 40 tablet 0     oxyCODONE (ROXICODONE) 5 MG tablet Take 1 tablet (5 mg) by mouth every 6 hours as needed for pain 12 tablet 0       No Known Allergies    Review of Systems:   10 point ROS otherwise negative    Physical Exam:  /87 (BP Location: Right arm, Patient Position: Sitting, Cuff Size: Adult Large)   Pulse 123   Temp 96.8  F (36  C) (Tympanic)   Ht 1.854 m (6' 1\")   Wt 67.1 kg (147 lb 14.9 oz)   BMI 19.52 kg/m      Constitutional- No acute distress, well nourished, non-toxic  Eyes: Anicteric, no injection.  PERRL  ENT:  Normocephalic, atraumatic, Nose midline, moist mucus membranes  Neck - supple, no LAD, Thyroid smooth, " symmetric, Carotids without bruits  Respiratory- Clear to auscultation bilaterally, good inspiratory effort  Cardiovascular - Heart RRR, no lift's, thrills, murmurs, rubs, or gallop.  No peripheral edema.  No clubbing.  Abdomen - Soft, non-tender, +BS, no hepatosplenomegaly, no palpable masses  Rectal - BETO deferred at this time due to discomfort; left lateral gluteal cleft there is chronic skin changes, no overt pore or fistula evident.  No fluctuance, mild erythema, no current drainage  Neuro - No focal neuro deficits, Alert and oriented x 3  Psych: Appropriate mood and affect  Musculoskeletal: Normal gait, symmetric strength.  FROM upper and lower extremities.  Skin: Warm, Dry    Lab Results   Component Value Date    WBC 11.2 01/13/2021     Lab Results   Component Value Date    RBC 5.28 01/13/2021     Lab Results   Component Value Date    HGB 16.4 01/13/2021     Lab Results   Component Value Date    HCT 48.5 01/13/2021     Lab Results   Component Value Date    MCV 92 01/13/2021     Lab Results   Component Value Date    MCH 31.1 01/13/2021     Lab Results   Component Value Date    MCHC 33.8 01/13/2021     Lab Results   Component Value Date    RDW 13.5 01/13/2021     Lab Results   Component Value Date     01/13/2021     Last Comprehensive Metabolic Panel:  Sodium   Date Value Ref Range Status   01/13/2021 136 133 - 144 mmol/L Final     Potassium   Date Value Ref Range Status   01/13/2021 3.7 3.4 - 5.3 mmol/L Final     Chloride   Date Value Ref Range Status   01/13/2021 105 94 - 109 mmol/L Final     Carbon Dioxide   Date Value Ref Range Status   01/13/2021 27 20 - 32 mmol/L Final     Anion Gap   Date Value Ref Range Status   01/13/2021 4 3 - 14 mmol/L Final     Glucose   Date Value Ref Range Status   01/13/2021 85 70 - 99 mg/dL Final     Urea Nitrogen   Date Value Ref Range Status   01/13/2021 8 7 - 30 mg/dL Final     Creatinine   Date Value Ref Range Status   01/13/2021 1.00 0.66 - 1.25 mg/dL Final     GFR  Estimate   Date Value Ref Range Status   01/13/2021 >90 >60 mL/min/[1.73_m2] Final     Comment:     Non  GFR Calc  Starting 12/18/2018, serum creatinine based estimated GFR (eGFR) will be   calculated using the Chronic Kidney Disease Epidemiology Collaboration   (CKD-EPI) equation.       Calcium   Date Value Ref Range Status   01/13/2021 9.5 8.5 - 10.1 mg/dL Final     Bilirubin Total   Date Value Ref Range Status   01/13/2021 0.6 0.2 - 1.3 mg/dL Final     Alkaline Phosphatase   Date Value Ref Range Status   01/13/2021 42 40 - 150 U/L Final     ALT   Date Value Ref Range Status   01/13/2021 17 0 - 70 U/L Final     AST   Date Value Ref Range Status   01/13/2021 19 0 - 45 U/L Final     CT Abdomen/Pelvis:  EXAM: CT ABDOMEN PELVIS W CONTRAST  LOCATION: [facility]  DATE/TIME: 1/13/2021 6:45 PM     INDICATION: Perianal pain and swelling with bleeding and pus, history of Crohn's and perianal fistula.;  COMPARISON: Pelvic MRI 09/15/2020, CT abdomen pelvis 08/28/2020  TECHNIQUE: CT scan of the abdomen and pelvis was performed following injection of IV contrast. Multiplanar reformats were obtained. Dose reduction techniques were used.  CONTRAST: 72 ml Isovue 370     FINDINGS:   LOWER CHEST: Normal.     HEPATOBILIARY: Normal.     PANCREAS: Normal.     SPLEEN: Normal.     ADRENAL GLANDS: Normal.     KIDNEYS/BLADDER: Normal.     BOWEL: There is mild terminal ileum wall thickening and enhancement similar to prior exam. No evidence for bowel obstruction. No free air, free fluid, or inflammatory change.     There is redemonstration of left perineal abscess which measures approximately 2 cm.     LYMPH NODES: Normal.     VASCULATURE: Unremarkable.     PELVIC ORGANS: Normal.     MUSCULOSKELETAL: Normal.                                                                      IMPRESSION:   1.  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 Overall size at  approximately 2 cm is unchanged. No new abscess.  2.  Chronic recurrent mild terminal ileal inflammation.    Assessment:  1. Fistula-in-ano    2. Crohn's disease of both small and large intestine with fistula (H)      Plan:   Mr. Archibald presents with complicated Crohn's.  At this time he has spontaneously drained twice and is currently on antibiotics.  I reviewed his imaging and previous GI notes, last 9/2020 at which time the recommendation was referral to colorectal surgery for management of known abscess at that time, he did not do this.  He has had 2 exacerbations since that time.  I advised that treatment of perianal crohn's involves managing any fluid collections and control of fistulas and optimal management of his Crohn's.  I advised him that I would be able to do a rectal exam under anesthesia, potential abscess drainage if one was found, and potential seton placement.  Alternatively, I advised he could see colorectal surgery for future definitive management of fistula.  I also advised that he would need to see his gastroenterologist on an urgent basis.  I do not have an indication for an emergent intervention at this time.  I provided business card should his drainage or pain worsen at which time I would perform a rectal exam under anesthesia.  He is agreeable with this plan.    Summary : Complete antibiotics, see his gastroenterologist, referral to colorectal surgery    Anil Bales DO on 1/19/2021 at 10:41 AM            Again, thank you for allowing me to participate in the care of your patient.        Sincerely,        Anil Bales DO

## 2021-01-19 NOTE — PROGRESS NOTES
Surgical Consultation/History and Physical  [facility]    Carlyle is seen in consultation for perianal abscess, at the request of Smiley Rivera NP.    Chief Complaint:  Perianal abscess    History of Present Illness: Carlyle Archibald is a 20 year old male presents for follow-up after ED with perianal abscess and fistula.  Patient has a history of 2 similar episodes in the past; initial episode treated with I and D in the ED.  His last occurrence was managed with antibiotics alone.  He follows with [facility] for his Crohn's.  He has been on Humira and states he has not been as well controlled since that time.  He has not seen his gastroenterologist for this.     At present he complains of intermittent drainage from the area.  Denies fevers, chills, nausea, vomiting, chest pain or shortness of breath.  He denies any other abdominal pain at this time.  He is unsure of his last colonoscopy.    Patient Active Problem List   Diagnosis     Personal history of disease     Osgood-Schlatter's disease     GERD (gastroesophageal reflux disease)     Crohn's disease (H)     Perianal abscess     Substance abuse (H)     Major depressive disorder, single episode, mild (H)     Anxiety     Past Medical History:   Diagnosis Date     Closed fracture of unspecified part of humerus 8/2005    Broken Left Arm     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     Meningitis, unspecified(322.9)     at 2 mos old, no long term complications       Past Surgical History:   Procedure Laterality Date     COLONOSCOPY  1/17/2014    Procedure: COMBINED COLONOSCOPY, SINGLE BIOPSY/POLYPECTOMY BY BIOPSY;  Colonoscopy;  Surgeon: Una Alcazar MD;  Location: [facility]     TONSILLECTOMY & ADENOIDECTOMY  about 1 1/1 yo       Family History   Problem Relation Age of Onset     Allergies Mother      Lipids Mother      Allergies Father      Gastrointestinal Disease Father   "       Crohn's disease     Lipids Maternal Grandmother      Cancer Maternal Grandmother      Lipids Maternal Grandfather      Cancer Maternal Grandfather      Diabetes Paternal Grandfather      Cancer Paternal Grandmother      Anxiety Disorder Sister        Social History     Tobacco Use     Smoking status: Never Smoker     Smokeless tobacco: Never Used   Substance Use Topics     Alcohol use: No        History   Drug Use No       Current Outpatient Medications   Medication Sig Dispense Refill     Acetaminophen (TYLENOL PO) Take 1,000 mg by mouth every 6 hours as needed for mild pain or fever       adalimumab (HUMIRA) 40 MG/0.8ML prefilled syringe kit Inject 40 mg Subcutaneous every 14 days       amoxicillin-clavulanate (AUGMENTIN) 875-125 MG tablet Take 1 tablet by mouth 2 times daily for 10 days 20 tablet 0     escitalopram (LEXAPRO) 10 MG tablet Take 0.5 tablets (5 mg) by mouth daily for 14 days, THEN 1 tablet (10 mg) daily. 30 tablet 1     metroNIDAZOLE (FLAGYL) 500 MG tablet Take 1 tablet (500 mg) by mouth 4 times daily for 10 days 40 tablet 0     oxyCODONE (ROXICODONE) 5 MG tablet Take 1 tablet (5 mg) by mouth every 6 hours as needed for pain 12 tablet 0       No Known Allergies    Review of Systems:   10 point ROS otherwise negative    Physical Exam:  /87 (BP Location: Right arm, Patient Position: Sitting, Cuff Size: Adult Large)   Pulse 123   Temp 96.8  F (36  C) (Tympanic)   Ht 1.854 m (6' 1\")   Wt 67.1 kg (147 lb 14.9 oz)   BMI 19.52 kg/m      Constitutional- No acute distress, well nourished, non-toxic  Eyes: Anicteric, no injection.  PERRL  ENT:  Normocephalic, atraumatic, Nose midline, moist mucus membranes  Neck - supple, no LAD, Thyroid smooth, symmetric, Carotids without bruits  Respiratory- Clear to auscultation bilaterally, good inspiratory effort  Cardiovascular - Heart RRR, no lift's, thrills, murmurs, rubs, or gallop.  No peripheral edema.  No clubbing.  Abdomen - Soft, non-tender, " +BS, no hepatosplenomegaly, no palpable masses  Rectal - BETO deferred at this time due to discomfort; left lateral gluteal cleft there is chronic skin changes, no overt pore or fistula evident.  No fluctuance, mild erythema, no current drainage  Neuro - No focal neuro deficits, Alert and oriented x 3  Psych: Appropriate mood and affect  Musculoskeletal: Normal gait, symmetric strength.  FROM upper and lower extremities.  Skin: Warm, Dry    Lab Results   Component Value Date    WBC 11.2 01/13/2021     Lab Results   Component Value Date    RBC 5.28 01/13/2021     Lab Results   Component Value Date    HGB 16.4 01/13/2021     Lab Results   Component Value Date    HCT 48.5 01/13/2021     Lab Results   Component Value Date    MCV 92 01/13/2021     Lab Results   Component Value Date    MCH 31.1 01/13/2021     Lab Results   Component Value Date    MCHC 33.8 01/13/2021     Lab Results   Component Value Date    RDW 13.5 01/13/2021     Lab Results   Component Value Date     01/13/2021     Last Comprehensive Metabolic Panel:  Sodium   Date Value Ref Range Status   01/13/2021 136 133 - 144 mmol/L Final     Potassium   Date Value Ref Range Status   01/13/2021 3.7 3.4 - 5.3 mmol/L Final     Chloride   Date Value Ref Range Status   01/13/2021 105 94 - 109 mmol/L Final     Carbon Dioxide   Date Value Ref Range Status   01/13/2021 27 20 - 32 mmol/L Final     Anion Gap   Date Value Ref Range Status   01/13/2021 4 3 - 14 mmol/L Final     Glucose   Date Value Ref Range Status   01/13/2021 85 70 - 99 mg/dL Final     Urea Nitrogen   Date Value Ref Range Status   01/13/2021 8 7 - 30 mg/dL Final     Creatinine   Date Value Ref Range Status   01/13/2021 1.00 0.66 - 1.25 mg/dL Final     GFR Estimate   Date Value Ref Range Status   01/13/2021 >90 >60 mL/min/[1.73_m2] Final     Comment:     Non  GFR Calc  Starting 12/18/2018, serum creatinine based estimated GFR (eGFR) will be   calculated using the Chronic Kidney Disease  Epidemiology Collaboration   (CKD-EPI) equation.       Calcium   Date Value Ref Range Status   01/13/2021 9.5 8.5 - 10.1 mg/dL Final     Bilirubin Total   Date Value Ref Range Status   01/13/2021 0.6 0.2 - 1.3 mg/dL Final     Alkaline Phosphatase   Date Value Ref Range Status   01/13/2021 42 40 - 150 U/L Final     ALT   Date Value Ref Range Status   01/13/2021 17 0 - 70 U/L Final     AST   Date Value Ref Range Status   01/13/2021 19 0 - 45 U/L Final     CT Abdomen/Pelvis:  EXAM: CT ABDOMEN PELVIS W CONTRAST  LOCATION: [facility]  DATE/TIME: 1/13/2021 6:45 PM     INDICATION: Perianal pain and swelling with bleeding and pus, history of Crohn's and perianal fistula.;  COMPARISON: Pelvic MRI 09/15/2020, CT abdomen pelvis 08/28/2020  TECHNIQUE: CT scan of the abdomen and pelvis was performed following injection of IV contrast. Multiplanar reformats were obtained. Dose reduction techniques were used.  CONTRAST: 72 ml Isovue 370     FINDINGS:   LOWER CHEST: Normal.     HEPATOBILIARY: Normal.     PANCREAS: Normal.     SPLEEN: Normal.     ADRENAL GLANDS: Normal.     KIDNEYS/BLADDER: Normal.     BOWEL: There is mild terminal ileum wall thickening and enhancement similar to prior exam. No evidence for bowel obstruction. No free air, free fluid, or inflammatory change.     There is redemonstration of left perineal abscess which measures approximately 2 cm.     LYMPH NODES: Normal.     VASCULATURE: Unremarkable.     PELVIC ORGANS: Normal.     MUSCULOSKELETAL: Normal.                                                                      IMPRESSION:   1.  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 Overall size at approximately 2 cm is unchanged. No new abscess.  2.  Chronic recurrent mild terminal ileal inflammation.    Assessment:  1. Fistula-in-ano    2. Crohn's disease of both small and large intestine with fistula (H)      Plan:   Mr. Archibald presents with  complicated Crohn's.  At this time he has spontaneously drained twice and is currently on antibiotics.  I reviewed his imaging and previous GI notes, last 9/2020 at which time the recommendation was referral to colorectal surgery for management of known abscess at that time, he did not do this.  He has had 2 exacerbations since that time.  I advised that treatment of perianal crohn's involves managing any fluid collections and control of fistulas and optimal management of his Crohn's.  I advised him that I would be able to do a rectal exam under anesthesia, potential abscess drainage if one was found, and potential seton placement.  Alternatively, I advised he could see colorectal surgery for future definitive management of fistula.  I also advised that he would need to see his gastroenterologist on an urgent basis.  I do not have an indication for an emergent intervention at this time.  I provided business card should his drainage or pain worsen at which time I would perform a rectal exam under anesthesia.  He is agreeable with this plan.    Summary : Complete antibiotics, see his gastroenterologist, referral to colorectal surgery    Anil Bales,  on 1/19/2021 at 10:41 AM

## 2021-01-19 NOTE — NURSING NOTE
"Initial /87 (BP Location: Right arm, Patient Position: Sitting, Cuff Size: Adult Large)   Pulse 123   Temp 96.8  F (36  C) (Tympanic)   Ht 1.854 m (6' 1\")   Wt 67.1 kg (147 lb 14.9 oz)   BMI 19.52 kg/m   Estimated body mass index is 19.52 kg/m  as calculated from the following:    Height as of this encounter: 1.854 m (6' 1\").    Weight as of this encounter: 67.1 kg (147 lb 14.9 oz). .    Anne-Marie Osborne MA    "

## 2021-01-20 NOTE — TELEPHONE ENCOUNTER
RECORDS RECEIVED FROM: Internal    DATE RECEIVED: 2/3/2021   NOTES STATUS DETAILS   OFFICE NOTE from referring provider  Internal Internal recs in epic

## 2021-01-26 ENCOUNTER — TELEPHONE (OUTPATIENT)
Dept: FAMILY MEDICINE | Facility: CLINIC | Age: 21
End: 2021-01-26

## 2021-01-26 NOTE — TELEPHONE ENCOUNTER
Patient Quality Outreach Summary      Summary:    Patient is due/failing the following:   PHQ-9 Needed    Type of outreach:    Phone, mailbox is full, unable to leave message    Questions for provider review:    None                                                                                                                    ELAINA No MA       Chart routed to Care Team.

## 2021-01-26 NOTE — LETTER
February 2, 2021      Carlyle Archibald  [address] [id number]        Dear Carlyle,     Your healthcare team cares about your health. To provide you with the best care,   we have reviewed your chart and based on our findings, we see that you are due for:     An update on the depression and anxiety questionnaires.  These tools help your provider to monitor and manage your symptoms and treatment plan.  Please complete the attached questionnaires and send back to the clinic.    Thank you for choosing [facility] for your healthcare needs. For any questions,   concerns, or to schedule an appointment please contact the clinic.       Healthy Regards,      Your [facility] Care Team

## 2021-02-01 NOTE — PROGRESS NOTES
"Colon and Rectal Surgery Consult Video Note    Date: 2021     Referring provider:  Anil Bales, DO  [address] [place] [address]     RE: Carlyle Archibald  : 2000  LOU: 2/3/2021    Carlyle Archibald is a 20 year old male who is being evaluated via a billable video visit.      The patient has been notified of following:     \"This video visit will be conducted via a call between you and your physician/provider. We have found that certain health care needs can be provided without the need for an in-person physical exam.  This service lets us provide the care you need with a video conversation.  If a prescription is necessary we can send it directly to your pharmacy.  If lab work is needed we can place an order for that and you can then stop by our lab to have the test done at a later time.    Video visits are billed at different rates depending on your insurance coverage.  Please reach out to your insurance provider with any questions.    If during the course of the call the physician/provider feels a video visit is not appropriate, you will not be charged for this service.\"    Patient has given verbal consent for Video visit? Yes    Video Start Time: 7:18 AM     Carlyle Archibald is a 20 year old male with Crohn's disease. He is now present for perianal disease, poorly controlled at this time. He reports two episodes of abscesses with spontaneous drainage. Follows on [facility] for his Crohn's disease, on Humira. Has not seen GI in about a year. Diagnosed with Crohn's 7 years ago. Reports moderate symptom controlled. Intermittent abdominal pain daily, having 2-3 BM daily, loose, intermittent blood in stool, most recently as yesterday, did have to strain. Reports 4-5 year history of the onset of perianal disease. Did have an abscess lanced 5 years ago. Has had abscesses recur 2-3x since then. No drainage at this time in between bowel movements. Primarily complains of discomfort     Colonoscopy in " 2014:   Impression:                 - Congested mucosa distal ileum. This was biopsied.   - Congested mucosa entire examined colon. This was biopsied.     MR Pelvis 9/14/20:  IMPRESSION: Small left-sided perianal abscess with fistulous  communication to the approximate 1-2:00 position of the anus.    Most recent CT 1/13/21:  IMPRESSION:   1.  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 Overall size at approximately 2 cm is unchanged. No new abscess.  2.  Chronic recurrent mild terminal ileal inflammation.    Assessment/Plan: 20 year old male with Crohn's disease with recurrent perirectal abscesses with MRI consistent with fistula as likely etiology for recurrent left abscesses.  -I agree with Dr. Bales's assessment. Recommend EUA with likely seton placement to obtain control of perirectal disease, followed by continued medical management with GI thereafter. This can be done with me or Dr. Bales. Prefers Dr. Bales given proximity, which is reasonable.  -I strongly recommended re-establishing cares with his gastroenterologist as well to update cares, re-evaluate utility of Humira in setting of ongoing symptoms. Following placement of seton, medication regimen will likely require changes to obtain better control of his Crohn's.  -He will contact my clinic with any questions or concerns.      Video-Visit Details    Type of service:  Video Visit    Video End Time (time video stopped): 7:44 AM    Originating Location (pt. Location): Home    Distant Location (provider location):  [facility] RECTAL SURGERY CLIN[facility] [place]     Mode of Communication:  Video Conference via doximity     Medical history:  Past Medical History:   Diagnosis Date     Closed fracture of unspecified part of humerus 8/2005    Broken Left Arm     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)     at 2 mos old, no long term complications       Surgical history:  Past Surgical History:   Procedure Laterality Date     COLONOSCOPY  1/17/2014    Procedure: COMBINED COLONOSCOPY, SINGLE BIOPSY/POLYPECTOMY BY BIOPSY;  Colonoscopy;  Surgeon: Una Alcazar MD;  Location: [facility]     TONSILLECTOMY & ADENOIDECTOMY  about 1 1/1 yo       Problem list:  Patient Active Problem List    Diagnosis Date Noted     Anxiety 03/27/2017     Priority: Medium     Substance abuse (H) 11/08/2016     Priority: Medium     Major depressive disorder, single episode, mild (H) 11/08/2016     Priority: Medium     Perianal abscess 02/04/2014     Priority: Medium     Crohn's disease (H) 01/25/2014     Priority: Medium     - Diagnosed: 1/2014, at age: 13 years     - Location (visual):    Ileocolonic (L3)                                                                 Unknown (EGD was not performed):                                         Upper GI disease proximal to ligament of Treitz (L4a)                               Upper GI disease distal to ligament of Treitz  and proximal to distal 1/3 ileum (L4b)    - Extent (histopath): TI, Entire Colon    - Behavior:     Non-stricturing, non-penetrating (B1)                            Perianal (p)    - Growth:       No evidence of growth delay (G0)                            - TPMT phenotype:     Normal 27.1  [id number]  - IBD7:                            Not done    - PPD status:      2/2014: pending    - Extraintestinal manifestations: None (erythema nodosum, pyoderma gangrenosum, arthritis, arthralgia, fevers, PSC, others.)    - Previous IBD-related medications and reasons for their discontinuation:    - Immunization status: Fully Immunized for age  - Varicella, measles, mumps IgG status: Unknown, Positive titers: 2/2014: pending    - Eye exam:                date and result    - Previous abdominal surgeries: dates  - Previous admissions for IBD: dates       GERD  (gastroesophageal reflux disease) 01/07/2014     Priority: Medium     Osgood-Schlatter's disease 09/10/2012     Priority: Medium     Left knee       Personal history of disease 09/08/2006     Priority: Medium     Problem list name updated by automated process. Provider to review         Medications:  Current Outpatient Medications   Medication Sig Dispense Refill     Acetaminophen (TYLENOL PO) Take 1,000 mg by mouth every 6 hours as needed for mild pain or fever       adalimumab (HUMIRA) 40 MG/0.8ML prefilled syringe kit Inject 40 mg Subcutaneous every 14 days       escitalopram (LEXAPRO) 10 MG tablet Take 0.5 tablets (5 mg) by mouth daily for 14 days, THEN 1 tablet (10 mg) daily. 30 tablet 1     oxyCODONE (ROXICODONE) 5 MG tablet Take 1 tablet (5 mg) by mouth every 6 hours as needed for pain 12 tablet 0       Allergies:  Allergies   Allergen Reactions     Ciprofloxacin Hives       Family history:  Family History   Problem Relation Age of Onset     Allergies Mother      Lipids Mother      Allergies Father      Gastrointestinal Disease Father         Crohn's disease     Lipids Maternal Grandmother      Cancer Maternal Grandmother      Lipids Maternal Grandfather      Cancer Maternal Grandfather      Diabetes Paternal Grandfather      Cancer Paternal Grandmother      Anxiety Disorder Sister        Social history:  Social History     Tobacco Use     Smoking status: Never Smoker     Smokeless tobacco: Never Used   Substance Use Topics     Alcohol use: Yes     Comment: social    Marital status: single.    Saul Ovalles MD  Division of Colon and Rectal Surgery  [facility]  [phone]

## 2021-02-03 ENCOUNTER — PRE VISIT (OUTPATIENT)
Dept: SURGERY | Facility: CLINIC | Age: 21
End: 2021-02-03

## 2021-02-03 ENCOUNTER — VIRTUAL VISIT (OUTPATIENT)
Dept: SURGERY | Facility: CLINIC | Age: 21
End: 2021-02-03
Attending: SURGERY
Payer: COMMERCIAL

## 2021-02-03 VITALS — HEIGHT: 73 IN | WEIGHT: 145 LBS | BODY MASS INDEX: 19.22 KG/M2

## 2021-02-03 DIAGNOSIS — K61.1 PERIRECTAL ABSCESS: ICD-10-CM

## 2021-02-03 DIAGNOSIS — K50.913 CROHN'S DISEASE WITH FISTULA, UNSPECIFIED GASTROINTESTINAL TRACT LOCATION (H): Primary | ICD-10-CM

## 2021-02-03 PROCEDURE — 99203 OFFICE O/P NEW LOW 30 MIN: CPT | Mod: 95 | Performed by: SURGERY

## 2021-02-03 RX ORDER — METRONIDAZOLE 500 MG/1
500 TABLET ORAL 4 TIMES DAILY
Qty: 40 TABLET | Refills: 0 | Status: SHIPPED | OUTPATIENT
Start: 2021-02-03 | End: 2021-02-16

## 2021-02-03 RX ORDER — AMOXICILLIN AND CLAVULANATE POTASSIUM 500; 125 MG/1; MG/1
1 TABLET, FILM COATED ORAL 3 TIMES DAILY
Qty: 30 TABLET | Refills: 0 | Status: SHIPPED | OUTPATIENT
Start: 2021-02-03 | End: 2021-02-16

## 2021-02-03 ASSESSMENT — MIFFLIN-ST. JEOR: SCORE: 1711.6

## 2021-02-03 ASSESSMENT — PAIN SCALES - GENERAL: PAINLEVEL: MODERATE PAIN (4)

## 2021-02-03 NOTE — CONFIDENTIAL NOTE
Requested Prescriptions   Pending Prescriptions Disp Refills     amoxicillin-clavulanate (AUGMENTIN) 500-125 MG tablet 30 tablet 0     Sig: Take 1 tablet by mouth 3 times daily       There is no refill protocol information for this order        metroNIDAZOLE (FLAGYL) 500 MG tablet 40 tablet 0     Sig: Take 1 tablet (500 mg) by mouth 4 times daily       There is no refill protocol information for this order        Last Written Prescription Date:    Last Fill Quantity: ,  # refills:    Last office visit: 1/19/2021 with prescribing provider:  Amairani   Future Office Visit:

## 2021-02-03 NOTE — NURSING NOTE
"Chief Complaint   Patient presents with     Consult     New patient consult. Video visit.        Vitals:    02/03/21 0656   Weight: 65.8 kg (145 lb)   Height: 1.854 m (6' 1\")       Body mass index is 19.13 kg/m .    Andrew Wise LPN      "

## 2021-02-03 NOTE — TELEPHONE ENCOUNTER
Per todays' visit with GI:    IMPRESSION:   1.  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 Overall size at approximately 2 cm is unchanged. No new abscess.  2.  Chronic recurrent mild terminal ileal inflammation.     Assessment/Plan: 20 year old male with Crohn's disease with recurrent perirectal abscesses with MRI consistent with fistula as likely etiology for recurrent left abscesses.  -I agree with Dr. Bales's assessment. Recommend EUA with likely seton placement to obtain control of perirectal disease, followed by continued medical management with GI thereafter. This can be done with me or Dr. Bales. Prefers Dr. Bales given proximity, which is reasonable.  -I strongly recommended re-establishing cares with his gastroenterologist as well to update cares, re-evaluate utility of Humira in setting of ongoing symptoms. Following placement of seton, medication regimen will likely require changes to obtain better control of his Crohn's.  -He will contact my clinic with any questions or concerns    Amber SHAH   Specialty Clinic RN

## 2021-02-03 NOTE — LETTER
"2/3/2021       RE: Carlyle Archiblad  [address] [facility] [address]     Dear Colleague,    Thank you for referring your patient, Carlyle Archibald, to the [facility] COLON AND RECTAL SURGERY CLINIC [place] at [facility]. Please see a copy of my visit note below.    Colon and Rectal Surgery Consult Video Note    Date: 2021     Referring provider:  Anil Bales, DO  [address] [place] [address]     RE: Carlyle Archibald  : 2000  LOU: 2/3/2021    Carlyle Archibald is a 20 year old male who is being evaluated via a billable video visit.      The patient has been notified of following:     \"This video visit will be conducted via a call between you and your physician/provider. We have found that certain health care needs can be provided without the need for an in-person physical exam.  This service lets us provide the care you need with a video conversation.  If a prescription is necessary we can send it directly to your pharmacy.  If lab work is needed we can place an order for that and you can then stop by our lab to have the test done at a later time.    Video visits are billed at different rates depending on your insurance coverage.  Please reach out to your insurance provider with any questions.    If during the course of the call the physician/provider feels a video visit is not appropriate, you will not be charged for this service.\"    Patient has given verbal consent for Video visit? Yes    Video Start Time: 7:18 AM     Carlyle Archibald is a 20 year old male with Crohn's disease. He is now present for perianal disease, poorly controlled at this time. He reports two episodes of abscesses with spontaneous drainage. Follows on [facility] for his Crohn's disease, on Humira. Has not seen GI in about a year. Diagnosed with Crohn's 7 years ago. Reports moderate symptom controlled. Intermittent abdominal pain daily, having 2-3 BM daily, loose, " intermittent blood in stool, most recently as yesterday, did have to strain. Reports 4-5 year history of the onset of perianal disease. Did have an abscess lanced 5 years ago. Has had abscesses recur 2-3x since then. No drainage at this time in between bowel movements. Primarily complains of discomfort     Colonoscopy in 2014:   Impression:                 - Congested mucosa distal ileum. This was biopsied.   - Congested mucosa entire examined colon. This was biopsied.     MR Pelvis 9/14/20:  IMPRESSION: Small left-sided perianal abscess with fistulous  communication to the approximate 1-2:00 position of the anus.    Most recent CT 1/13/21:  IMPRESSION:   1.  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 Overall size at approximately 2 cm is unchanged. No new abscess.  2.  Chronic recurrent mild terminal ileal inflammation.    Assessment/Plan: 20 year old male with Crohn's disease with recurrent perirectal abscesses with MRI consistent with fistula as likely etiology for recurrent left abscesses.  -I agree with Dr. Bales's assessment. Recommend EUA with likely seton placement to obtain control of perirectal disease, followed by continued medical management with GI thereafter. This can be done with me or Dr. Bales. Prefers Dr. Bales given proximity, which is reasonable.  -I strongly recommended re-establishing cares with his gastroenterologist as well to update cares, re-evaluate utility of Humira in setting of ongoing symptoms. Following placement of seton, medication regimen will likely require changes to obtain better control of his Crohn's.  -He will contact my clinic with any questions or concerns.      Video-Visit Details    Type of service:  Video Visit    Video End Time (time video stopped): 7:44 AM    Originating Location (pt. Location): Home    Distant Location (provider location):  [facility] COLON AND RECTAL SURGERY CLINIC [place]      Mode of Communication:  Video Conference via [employer]     Medical history:  Past Medical History:   Diagnosis Date     Closed fracture of unspecified part of humerus 8/2005    Broken Left Arm     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     Meningitis, unspecified(322.9)     at 2 mos old, no long term complications       Surgical history:  Past Surgical History:   Procedure Laterality Date     COLONOSCOPY  1/17/2014    Procedure: COMBINED COLONOSCOPY, SINGLE BIOPSY/POLYPECTOMY BY BIOPSY;  Colonoscopy;  Surgeon: Una Alcazar MD;  Location: [facility]     TONSILLECTOMY & ADENOIDECTOMY  about 1 1/21 yo       Problem list:  Patient Active Problem List    Diagnosis Date Noted     Anxiety 03/27/2017     Priority: Medium     Substance abuse (H) 11/08/2016     Priority: Medium     Major depressive disorder, single episode, mild (H) 11/08/2016     Priority: Medium     Perianal abscess 02/04/2014     Priority: Medium     Crohn's disease (H) 01/25/2014     Priority: Medium     - Diagnosed: 1/2014, at age: 13 years     - Location (visual):    Ileocolonic (L3)                                                                 Unknown (EGD was not performed):                                         Upper GI disease proximal to ligament of Treitz (L4a)                               Upper GI disease distal to ligament of Treitz  and proximal to distal 1/3 ileum (L4b)    - Extent (histopath): TI, Entire Colon    - Behavior:     Non-stricturing, non-penetrating (B1)                            Perianal (p)    - Growth:       No evidence of growth delay (G0)                            - TPMT phenotype:     Normal 27.1  [id number]  - IBD7:                            Not done    - PPD status:      2/2014: pending    - Extraintestinal manifestations: None (erythema nodosum, pyoderma gangrenosum, arthritis, arthralgia, fevers, PSC, others.)    - Previous IBD-related  medications and reasons for their discontinuation:    - Immunization status: Fully Immunized for age  - Varicella, measles, mumps IgG status: Unknown, Positive titers: 2/2014: pending    - Eye exam:                date and result    - Previous abdominal surgeries: dates  - Previous admissions for IBD: dates       GERD (gastroesophageal reflux disease) 01/07/2014     Priority: Medium     Osgood-Schlatter's disease 09/10/2012     Priority: Medium     Left knee       Personal history of disease 09/08/2006     Priority: Medium     Problem list name updated by automated process. Provider to review         Medications:  Current Outpatient Medications   Medication Sig Dispense Refill     Acetaminophen (TYLENOL PO) Take 1,000 mg by mouth every 6 hours as needed for mild pain or fever       adalimumab (HUMIRA) 40 MG/0.8ML prefilled syringe kit Inject 40 mg Subcutaneous every 14 days       escitalopram (LEXAPRO) 10 MG tablet Take 0.5 tablets (5 mg) by mouth daily for 14 days, THEN 1 tablet (10 mg) daily. 30 tablet 1     oxyCODONE (ROXICODONE) 5 MG tablet Take 1 tablet (5 mg) by mouth every 6 hours as needed for pain 12 tablet 0       Allergies:  Allergies   Allergen Reactions     Ciprofloxacin Hives       Family history:  Family History   Problem Relation Age of Onset     Allergies Mother      Lipids Mother      Allergies Father      Gastrointestinal Disease Father         Crohn's disease     Lipids Maternal Grandmother      Cancer Maternal Grandmother      Lipids Maternal Grandfather      Cancer Maternal Grandfather      Diabetes Paternal Grandfather      Cancer Paternal Grandmother      Anxiety Disorder Sister        Social history:  Social History     Tobacco Use     Smoking status: Never Smoker     Smokeless tobacco: Never Used   Substance Use Topics     Alcohol use: Yes     Comment: social    Marital status: single.    Saul Ovalles MD  Division of Colon and Rectal Surgery  [facility]  [place] [phone]

## 2021-02-04 ENCOUNTER — TELEPHONE (OUTPATIENT)
Dept: SURGERY | Facility: CLINIC | Age: 21
End: 2021-02-04

## 2021-02-04 DIAGNOSIS — K60.30 FISTULA-IN-ANO: Primary | ICD-10-CM

## 2021-02-04 NOTE — TELEPHONE ENCOUNTER
Reason for Call:  Other     Detailed comments: Pt was seen a couple weeks ago and had discussed a procedure about doing some kind of implants (seton placement) for his fistula. He got a second opinion, and that provider was in agreement. Pt would like to schedule this in [place] if possible - Please contact pt     Phone Number Patient can be reached at: Home number on file [phone] (home)    Best Time: Any    Can we leave a detailed message on this number? YES    Call taken on 2/4/2021 at 3:26 PM by Denise Behrendt

## 2021-02-08 ENCOUNTER — TELEPHONE (OUTPATIENT)
Dept: SURGERY | Facility: CLINIC | Age: 21
End: 2021-02-08

## 2021-02-08 DIAGNOSIS — Z11.59 ENCOUNTER FOR SCREENING FOR OTHER VIRAL DISEASES: ICD-10-CM

## 2021-02-08 PROBLEM — K60.30 FISTULA-IN-ANO: Status: ACTIVE | Noted: 2021-02-08

## 2021-02-08 LAB
SARS-COV-2 RNA RESP QL NAA+PROBE: NORMAL
SPECIMEN SOURCE: NORMAL

## 2021-02-08 PROCEDURE — U0003 INFECTIOUS AGENT DETECTION BY NUCLEIC ACID (DNA OR RNA); SEVERE ACUTE RESPIRATORY SYNDROME CORONAVIRUS 2 (SARS-COV-2) (CORONAVIRUS DISEASE [COVID-19]), AMPLIFIED PROBE TECHNIQUE, MAKING USE OF HIGH THROUGHPUT TECHNOLOGIES AS DESCRIBED BY CMS-2020-01-R: HCPCS | Performed by: SURGERY

## 2021-02-08 PROCEDURE — U0005 INFEC AGEN DETEC AMPLI PROBE: HCPCS | Performed by: SURGERY

## 2021-02-08 NOTE — TELEPHONE ENCOUNTER
Pt never returned call so surgery won't be on Wednesday.  Will most likely need to find a day next week now.  Per Dr. Bales, he is unavailable on Friday.      Juanis CRUZ CMA

## 2021-02-08 NOTE — TELEPHONE ENCOUNTER
Attempted to reach the pt multiple times, no answer and unable to leave VM.  I sent a [employer] message to the pt.  I also left a VM on the pt's father's number in attempt to schedule surgery for Wednesday 02/10.  Pt will need a STAT COVID test today, availability in [facility].      Juanis CRUZ CMA

## 2021-02-09 ENCOUNTER — TELEPHONE (OUTPATIENT)
Dept: LAB | Facility: CLINIC | Age: 21
End: 2021-02-09

## 2021-02-09 ENCOUNTER — MYC MEDICAL ADVICE (OUTPATIENT)
Dept: SURGERY | Facility: CLINIC | Age: 21
End: 2021-02-09

## 2021-02-09 ENCOUNTER — TELEPHONE (OUTPATIENT)
Dept: SURGERY | Facility: CLINIC | Age: 21
End: 2021-02-09

## 2021-02-09 LAB
LABORATORY COMMENT REPORT: ABNORMAL
SARS-COV-2 RNA RESP QL NAA+PROBE: POSITIVE
SPECIMEN SOURCE: ABNORMAL

## 2021-02-09 NOTE — TELEPHONE ENCOUNTER
Please call patient to inform him his COVID is positive.  We will do his surgery in 2 weeks after assuming he meets protocol.  His mailbox is full, attempted to call x1.     Thanks     Ramesh

## 2021-02-09 NOTE — TELEPHONE ENCOUNTER
Coronavirus (COVID-19) Notification    Reason for call  Notify of POSITIVE  COVID-19 lab result, assess symptoms,  review [facility] recommendations    Lab Result   Lab test for 2019-nCoV rRt-PCR or SARS-COV-2 PCR  Oropharyngeal AND/OR nasopharyngeal swabs were POSITIVE for 2019-nCoV RNA [OR] SARS-COV-2 RNA (COVID-19) RNA     We have been unable to reach Patient by phone at this time to notify of their Positive COVID-19 result.     POSITIVE COVID-19 Letter sent.    Attempted to contact pt, mailbox is full and cannot leave a msg.    Rebekah Lundberg

## 2021-02-10 NOTE — TELEPHONE ENCOUNTER
I spoke to Jnu today. I told him that his COVID is positive and so the surgery would be delayed. He said that last October he had been in close contact with someone with COVID and so he was tested at a [facility] and was positive then. His only symptom then was a runny nose and he has no symptoms now. I told him that I would send Dr Bales a message. Dr Bales how do you advise? Silva PRIETO Rn

## 2021-02-11 ENCOUNTER — MYC MEDICAL ADVICE (OUTPATIENT)
Dept: SURGERY | Facility: CLINIC | Age: 21
End: 2021-02-11

## 2021-02-11 NOTE — TELEPHONE ENCOUNTER
Pt tested positive for COVID.  Will wait to reschedule.  See MyChart message.    Juanis CRUZ, BRIANNA

## 2021-02-14 ENCOUNTER — MYC MEDICAL ADVICE (OUTPATIENT)
Dept: FAMILY MEDICINE | Facility: CLINIC | Age: 21
End: 2021-02-14

## 2021-02-15 ENCOUNTER — ANESTHESIA EVENT (OUTPATIENT)
Dept: SURGERY | Facility: CLINIC | Age: 21
End: 2021-02-15
Payer: COMMERCIAL

## 2021-02-17 ENCOUNTER — TRANSFERRED RECORDS (OUTPATIENT)
Dept: HEALTH INFORMATION MANAGEMENT | Facility: CLINIC | Age: 21
End: 2021-02-17

## 2021-02-17 ENCOUNTER — MYC MEDICAL ADVICE (OUTPATIENT)
Dept: SURGERY | Facility: CLINIC | Age: 21
End: 2021-02-17

## 2021-02-19 ENCOUNTER — ANESTHESIA (OUTPATIENT)
Dept: SURGERY | Facility: CLINIC | Age: 21
End: 2021-02-19
Payer: COMMERCIAL

## 2021-02-22 ENCOUNTER — HOSPITAL ENCOUNTER (OUTPATIENT)
Facility: CLINIC | Age: 21
Discharge: HOME | End: 2021-02-22
Attending: SURGERY | Admitting: SURGERY
Payer: COMMERCIAL

## 2021-02-22 VITALS
DIASTOLIC BLOOD PRESSURE: 74 MMHG | RESPIRATION RATE: 18 BRPM | HEIGHT: 73 IN | WEIGHT: 145 LBS | SYSTOLIC BLOOD PRESSURE: 113 MMHG | BODY MASS INDEX: 19.22 KG/M2 | OXYGEN SATURATION: 100 % | TEMPERATURE: 98 F | HEART RATE: 53 BPM

## 2021-02-22 DIAGNOSIS — K60.30 FISTULA-IN-ANO: ICD-10-CM

## 2021-02-22 PROCEDURE — 250N000009 HC RX 250: Performed by: NURSE ANESTHETIST, CERTIFIED REGISTERED

## 2021-02-22 PROCEDURE — 360N000075 HC SURGERY LEVEL 2, PER MIN: Performed by: SURGERY

## 2021-02-22 PROCEDURE — 999N000141 HC STATISTIC PRE-PROCEDURE NURSING ASSESSMENT: Performed by: SURGERY

## 2021-02-22 PROCEDURE — 250N000011 HC RX IP 250 OP 636: Performed by: SURGERY

## 2021-02-22 PROCEDURE — 45990 SURG DX EXAM ANORECTAL: CPT | Performed by: SURGERY

## 2021-02-22 PROCEDURE — 250N000013 HC RX MED GY IP 250 OP 250 PS 637: Performed by: NURSE ANESTHETIST, CERTIFIED REGISTERED

## 2021-02-22 PROCEDURE — 250N000011 HC RX IP 250 OP 636: Performed by: NURSE ANESTHETIST, CERTIFIED REGISTERED

## 2021-02-22 PROCEDURE — 370N000017 HC ANESTHESIA TECHNICAL FEE, PER MIN: Performed by: SURGERY

## 2021-02-22 PROCEDURE — 710N000012 HC RECOVERY PHASE 2, PER MINUTE: Performed by: SURGERY

## 2021-02-22 PROCEDURE — 272N000001 HC OR GENERAL SUPPLY STERILE: Performed by: SURGERY

## 2021-02-22 PROCEDURE — 258N000003 HC RX IP 258 OP 636: Performed by: NURSE ANESTHETIST, CERTIFIED REGISTERED

## 2021-02-22 RX ORDER — DEXAMETHASONE SODIUM PHOSPHATE 4 MG/ML
INJECTION, SOLUTION INTRA-ARTICULAR; INTRALESIONAL; INTRAMUSCULAR; INTRAVENOUS; SOFT TISSUE PRN
Status: DISCONTINUED | OUTPATIENT
Start: 2021-02-22 | End: 2021-02-22

## 2021-02-22 RX ORDER — LIDOCAINE 40 MG/G
CREAM TOPICAL
Status: DISCONTINUED | OUTPATIENT
Start: 2021-02-22 | End: 2021-02-22 | Stop reason: HOSPADM

## 2021-02-22 RX ORDER — ONDANSETRON 2 MG/ML
INJECTION INTRAMUSCULAR; INTRAVENOUS PRN
Status: DISCONTINUED | OUTPATIENT
Start: 2021-02-22 | End: 2021-02-22

## 2021-02-22 RX ORDER — HYDROMORPHONE HYDROCHLORIDE 1 MG/ML
.3-.5 INJECTION, SOLUTION INTRAMUSCULAR; INTRAVENOUS; SUBCUTANEOUS EVERY 10 MIN PRN
Status: CANCELLED | OUTPATIENT
Start: 2021-02-22

## 2021-02-22 RX ORDER — FENTANYL CITRATE 50 UG/ML
INJECTION, SOLUTION INTRAMUSCULAR; INTRAVENOUS PRN
Status: DISCONTINUED | OUTPATIENT
Start: 2021-02-22 | End: 2021-02-22

## 2021-02-22 RX ORDER — ONDANSETRON 2 MG/ML
4 INJECTION INTRAMUSCULAR; INTRAVENOUS EVERY 30 MIN PRN
Status: CANCELLED | OUTPATIENT
Start: 2021-02-22

## 2021-02-22 RX ORDER — CELECOXIB 200 MG/1
200 CAPSULE ORAL ONCE
Status: COMPLETED | OUTPATIENT
Start: 2021-02-22 | End: 2021-02-22

## 2021-02-22 RX ORDER — SODIUM CHLORIDE, SODIUM LACTATE, POTASSIUM CHLORIDE, CALCIUM CHLORIDE 600; 310; 30; 20 MG/100ML; MG/100ML; MG/100ML; MG/100ML
INJECTION, SOLUTION INTRAVENOUS CONTINUOUS
Status: CANCELLED | OUTPATIENT
Start: 2021-02-22

## 2021-02-22 RX ORDER — CEFAZOLIN SODIUM 2 G/100ML
2 INJECTION, SOLUTION INTRAVENOUS SEE ADMIN INSTRUCTIONS
Status: DISCONTINUED | OUTPATIENT
Start: 2021-02-22 | End: 2021-02-22 | Stop reason: HOSPADM

## 2021-02-22 RX ORDER — CEFAZOLIN SODIUM 2 G/100ML
2 INJECTION, SOLUTION INTRAVENOUS
Status: DISCONTINUED | OUTPATIENT
Start: 2021-02-22 | End: 2021-02-22 | Stop reason: HOSPADM

## 2021-02-22 RX ORDER — DIMENHYDRINATE 50 MG/ML
25 INJECTION, SOLUTION INTRAMUSCULAR; INTRAVENOUS
Status: CANCELLED | OUTPATIENT
Start: 2021-02-22

## 2021-02-22 RX ORDER — NALOXONE HYDROCHLORIDE 0.4 MG/ML
0.4 INJECTION, SOLUTION INTRAMUSCULAR; INTRAVENOUS; SUBCUTANEOUS
Status: CANCELLED | OUTPATIENT
Start: 2021-02-22 | End: 2021-02-23

## 2021-02-22 RX ORDER — FENTANYL CITRATE 50 UG/ML
25-50 INJECTION, SOLUTION INTRAMUSCULAR; INTRAVENOUS
Status: CANCELLED | OUTPATIENT
Start: 2021-02-22

## 2021-02-22 RX ORDER — MAGNESIUM SULFATE HEPTAHYDRATE 40 MG/ML
2 INJECTION, SOLUTION INTRAVENOUS ONCE
Status: COMPLETED | OUTPATIENT
Start: 2021-02-22 | End: 2021-02-22

## 2021-02-22 RX ORDER — PROPOFOL 10 MG/ML
INJECTION, EMULSION INTRAVENOUS CONTINUOUS PRN
Status: DISCONTINUED | OUTPATIENT
Start: 2021-02-22 | End: 2021-02-22

## 2021-02-22 RX ORDER — ALBUTEROL SULFATE 0.83 MG/ML
2.5 SOLUTION RESPIRATORY (INHALATION) EVERY 4 HOURS PRN
Status: CANCELLED | OUTPATIENT
Start: 2021-02-22

## 2021-02-22 RX ORDER — ACETAMINOPHEN 325 MG/1
975 TABLET ORAL ONCE
Status: COMPLETED | OUTPATIENT
Start: 2021-02-22 | End: 2021-02-22

## 2021-02-22 RX ORDER — OXYCODONE HYDROCHLORIDE 5 MG/1
5 TABLET ORAL EVERY 6 HOURS PRN
Qty: 12 TABLET | Refills: 0 | Status: SHIPPED | OUTPATIENT
Start: 2021-02-22 | End: 2021-02-25

## 2021-02-22 RX ORDER — DOCUSATE SODIUM 100 MG/1
100 CAPSULE, LIQUID FILLED ORAL 2 TIMES DAILY
Refills: 0 | COMMUNITY
Start: 2021-02-22 | End: 2021-07-20

## 2021-02-22 RX ORDER — MEPERIDINE HYDROCHLORIDE 25 MG/ML
12.5 INJECTION INTRAMUSCULAR; INTRAVENOUS; SUBCUTANEOUS
Status: CANCELLED | OUTPATIENT
Start: 2021-02-22

## 2021-02-22 RX ORDER — ONDANSETRON 4 MG/1
4 TABLET, ORALLY DISINTEGRATING ORAL EVERY 30 MIN PRN
Status: CANCELLED | OUTPATIENT
Start: 2021-02-22

## 2021-02-22 RX ORDER — LIDOCAINE HYDROCHLORIDE 10 MG/ML
INJECTION, SOLUTION EPIDURAL; INFILTRATION; INTRACAUDAL; PERINEURAL PRN
Status: DISCONTINUED | OUTPATIENT
Start: 2021-02-22 | End: 2021-02-22

## 2021-02-22 RX ORDER — GABAPENTIN 300 MG/1
300 CAPSULE ORAL ONCE
Status: COMPLETED | OUTPATIENT
Start: 2021-02-22 | End: 2021-02-22

## 2021-02-22 RX ORDER — NALOXONE HYDROCHLORIDE 0.4 MG/ML
0.2 INJECTION, SOLUTION INTRAMUSCULAR; INTRAVENOUS; SUBCUTANEOUS
Status: CANCELLED | OUTPATIENT
Start: 2021-02-22 | End: 2021-02-23

## 2021-02-22 RX ORDER — SODIUM CHLORIDE, SODIUM LACTATE, POTASSIUM CHLORIDE, CALCIUM CHLORIDE 600; 310; 30; 20 MG/100ML; MG/100ML; MG/100ML; MG/100ML
INJECTION, SOLUTION INTRAVENOUS CONTINUOUS
Status: DISCONTINUED | OUTPATIENT
Start: 2021-02-22 | End: 2021-02-22 | Stop reason: HOSPADM

## 2021-02-22 RX ADMIN — LIDOCAINE HYDROCHLORIDE 50 MG: 10 INJECTION, SOLUTION EPIDURAL; INFILTRATION; INTRACAUDAL; PERINEURAL at 10:54

## 2021-02-22 RX ADMIN — CEFAZOLIN SODIUM 2 G: 2 INJECTION, SOLUTION INTRAVENOUS at 10:48

## 2021-02-22 RX ADMIN — MIDAZOLAM 2 MG: 1 INJECTION INTRAMUSCULAR; INTRAVENOUS at 10:48

## 2021-02-22 RX ADMIN — CELECOXIB 200 MG: 200 CAPSULE ORAL at 09:30

## 2021-02-22 RX ADMIN — DEXAMETHASONE SODIUM PHOSPHATE 4 MG: 4 INJECTION, SOLUTION INTRA-ARTICULAR; INTRALESIONAL; INTRAMUSCULAR; INTRAVENOUS; SOFT TISSUE at 10:57

## 2021-02-22 RX ADMIN — SODIUM CHLORIDE, POTASSIUM CHLORIDE, SODIUM LACTATE AND CALCIUM CHLORIDE 1000 ML: 600; 310; 30; 20 INJECTION, SOLUTION INTRAVENOUS at 09:20

## 2021-02-22 RX ADMIN — FENTANYL CITRATE 100 MCG: 50 INJECTION, SOLUTION INTRAMUSCULAR; INTRAVENOUS at 10:50

## 2021-02-22 RX ADMIN — GABAPENTIN 300 MG: 300 CAPSULE ORAL at 09:30

## 2021-02-22 RX ADMIN — LIDOCAINE HYDROCHLORIDE 0.1 ML: 10 INJECTION, SOLUTION EPIDURAL; INFILTRATION; INTRACAUDAL; PERINEURAL at 09:21

## 2021-02-22 RX ADMIN — MAGNESIUM SULFATE HEPTAHYDRATE 2 G: 2 INJECTION, SOLUTION INTRAVENOUS at 09:21

## 2021-02-22 RX ADMIN — ONDANSETRON 4 MG: 2 INJECTION INTRAMUSCULAR; INTRAVENOUS at 10:57

## 2021-02-22 RX ADMIN — ACETAMINOPHEN 975 MG: 325 TABLET, FILM COATED ORAL at 09:30

## 2021-02-22 RX ADMIN — PROPOFOL 150 MCG/KG/MIN: 10 INJECTION, EMULSION INTRAVENOUS at 10:54

## 2021-02-22 ASSESSMENT — MIFFLIN-ST. JEOR: SCORE: 1711.6

## 2021-02-22 NOTE — OP NOTE
Procedure Date: 02/22/21      Pre-Op Diagnosis: 1. 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 The indications, risks, benefits and alternatives to surgery were discussed and he understood the counseling offered and wishes to proceed.     Procedure: After informed consent was obtained, the patient was brought to the operating room and placed in lithotomy position. The perineum and buttocks were prepped and draped in the standard fashion, and a procedural time out performed. The external anus was examined. There were circumferential chronic skin changes with chronically thickened skin, superficial fissures, particularly at 2 O'clock and 11 O'clock positions.  There was no fluctuance or erythema.  Digital examination was unremarkable for significant diminished tone, stricture, or palpable neoplasm.  There were numerous perianal skin tags. A speculum was then delivered into the anus, and circumferential evaluation of the rectum and anal canal performed.     At the 6-7 O'clock position a sentinel tag was noted.  This was probed however appeared superficial and did not track.  Gentle circumferential probing with a curved probe revealed no other openings nor potential tracts.  The MRI was reviewed and area identified examined more closely and no fistula nor opening identified.  Again the area at 6-7 O'clock appeared superficial.  With no fistula identified nor abscess, the procedure was terminated.    All sponge, instrument, and needle counts were correct prior to the end of the case. The patient tolerated the procedure well, was allowed to recover from anesthesia, taken out of lithotomy position, and transferred stable to the  recovery room.     Anil Bales DO on 2/22/2021 at 11:31 AM

## 2021-02-22 NOTE — DISCHARGE INSTRUCTIONS
HOME CARE FOLLOWING ABDOMINAL SURGERY    INCISIONAL CARE:  Replace the bandage over your incision (or incisions) until all drainage stops, or if more comfortable to have in place.  If present, leave the steri-strips (white paper tapes) in place for 14 days after surgery.  If you have staples in your incision at the time of discharge, they will be removed at your follow-up appointment.  If Dermabond (a type of skin glue) is present, leave in place until it wears/flakes off.     BATHING:  Avoid baths for 1 week after surgery.  Showers are okay.  You may wash your hair at any time.  Gently pat your incision dry after bathing.    ACTIVITY:  Light Activity -- you may immediately be up and about as tolerated.  Driving -- you may drive when comfortable and off narcotic pain medications (example: Norco, Percocet, Hydrocodone).  Light Work -- resume when comfortable off pain medications.  (If you can drive, you probably can work.)  Strenuous Work/Activity -- limit lifting to 20 pounds for 4 weeks.  Then, progressively increase with time.  Active Sports (running, biking, etc.) -- cautiously resume after 6 weeks.  Most importantly listen to your body.  If an activity causes pain or discomfort please stop and try in a few days or decrease your intensity.    DISCOMFORT:  Use pain medications as prescribed by your surgeon.  Take the pain medication with some food, when possible, to minimize side effects.  Expect gradual improvement.      Narcotic Pain Medications:  Do not drive, make important decisions or operate heavy machinery while on narcotic pain medications.  Narcotic pain medications can be associated with nausea, sleep disturbance, and constipation.  Unless directed otherwise, please take an over the counter stool softener (Colace 100mg twice a day) unless directed otherwise.  As soon as you are able to stop narcotic pain medications the better. Long term use of narcotics is associated with tolerance (the need for more  medication for effect) and potential addiction.    DIET:  Return to diet you were on before surgery, unless you are given specific diet instructions.  Drink plenty of fluids.  While taking pain medications, increase dietary fiber or add a fiber supplementation like Metamucil or Citrucel to help prevent constipation - a possible side effect of pain medications.    NAUSEA:  If nauseated from the anesthetic/pain meds; rest in bed, get up cautiously with assistance, and drink clear liquids (juice, tea, broth).    RETURN APPOINTMENT:  Schedule a follow-up visit 2-3 weeks after discharge from the hospital.  Office Phone: [phone]     CONTACT US IF THE FOLLOWING DEVELOPS:   1. A fever that is above 101     2. If there is a large amount of drainage, bleeding, or swelling.   3. Severe pain that is not relieved by your prescription.   4. Drainage that is thick, cloudy, yellow, green or white.   5. Any other questions not answered by  Frequently Asked Questions  sheet.      FREQUENTLY ASKED QUESTIONS:    Q:  How should my incision look?    A:  Normally your incision will appear slightly swollen with light redness directly along the incision itself as it heals.  It may feel like a bump or ridge as the healing/scarring happens, and over time (3-4 months) this bump or ridge feeling should slowly go away.  In general, clear or pink watery drainage can be normal at first as your incision heals, but should decrease over time.    Q:  How do I know if my incision is infected?  A:  Look at your incision for signs of infection, like redness around the incision spreading to surrounding skin, or drainage of cloudy or foul-smelling drainage.  If you feel warm, check your temperature to see if you are running a fever.    **If any of these things occur, please notify the nurse at our office.  We may need you to come into the office for an incision check.      Q:  How do I take care of my incision?  A:  If you have a dressing in place -  Starting the day after surgery, replace the dressing 1-2 times a day until there is no further drainage from the incision.  At that time, a dressing is no longer needed.  Try to minimize tape on the skin if irritation is occurring at the tape sites.  If you have significant irritation from tape on the skin, please call the office to discuss other method of dressing your incision.    Small pieces of tape called  steri-strips  may be present directly overlying your incision; these may be removed 10 days after surgery unless otherwise specified by your surgeon.  If these tapes start to loosen at the ends, you may trim them back until they fall off or are removed.    A:  If you had  Dermabond  tissue glue used as a dressing (this causes your incision to look shiny with a clear covering over it) - This type of dressing wears off with time and does not require more dressings over the top unless it is draining around the glue as it wears off.  Do not apply ointments or lotions over the incisions until the glue has completely worn off.    Q:  There is a piece of tape or a sticky  lead  still on my skin.  Can I remove this?  A:  Sometimes the sticky  leads  used for monitoring during surgery or for evaluation in the emergency department are not all removed while you are in the hospital.  These sometimes have a tab or metal dot on them.  You can easily remove these on your own, like taking off a band-aid.  If there is a gel substance under the  lead , simply wipe/clean it off with a washcloth or paper towel.      Q:  What can I do to minimize constipation (very hard stools, or lack of stools)?  A:  Stay well hydrated.  Increase your dietary fiber intake or take a fiber supplement -with plenty of water.  Walk around frequently.  You may consider an over-the-counter stool-softener.  Your Pharmacist can assist you with choosing one that is stocked at your pharmacy.  Constipation is also one of the most common side effects of  pain medication.  If you are using pain medication, be pro-active and try to PREVENT problems with constipation by taking the steps above BEFORE constipation becomes a problem.    Q:  What do I do if I need more pain medications?  A:  Call the office to receive refills.  Be aware that certain pain meds cannot be called into a pharmacy and actually require a paper prescription.  A change may be made in your pain med as you progress thru your recovery period or if you have side effects to certain meds.    --Pain meds are NOT refilled after 5pm on weekdays, and NOT AT ALL on the weekends, so please look ahead to prevent problems.      Q:  Why am I having a hard time sleeping now that I am at home?  A:  Many medications you receive while you are in the hospital can impact your sleep for a number of days after your surgery/hospitalization.  Decreased level of activity and naps during the day may also make sleeping at night difficult.  Try to minimize day-time naps, and get up frequently during the day to walk around your home during your recovery time.  Sleep aides may be of some help, but are not recommended for long-term use.      Q:  I am having some back discomfort.  What should I do?  A:  This may be related to certain positioning that was required for your surgery, extended periods of time in bed, or other changes in your overall activity level.  You may try ice, heat, acetaminophen, or ibuprofen to treat this temporarily.  Note that many pain medications have acetaminophen in them and would state this on the prescription bottle.  Be sure not to exceed the maximum of 4000mg per day of acetaminophen.     **If the pain you are having does not resolve, is severe, or is a flare of back pain you have had on other occasions prior to surgery, please contact your primary physician for further recommendations or for an appointment to be examined at their office.    Q:  Why am I having headaches?  A:  Headaches can be caused  by many things:  caffeine withdrawal, use of pain meds, dehydration, high blood pressure, lack of sleep, over-activity/exhaustion, flare-up of usual migraine headaches.  If you feel this is related to muscle tension (a band-like feeling around the head, or a pressure at the low-back of the head) you may try ice or heat to this area.  You may need to drink more fluids (try electrolyte drink like Gatorade), rest, or take your usual migraine medications.   **If your headaches do not resolve, worsen, are accompanied by other symptoms, or if your blood pressure is high, please call your primary physician for recommendation and/or examination.    Q:  I am unable to urinate.  What do I do?  A:  A small percentage of people can have difficulty urinating initially after surgery.  This includes being able to urinate only a very small amount at a time and feeling discomfort or pressure in the very low abdomen.  This is called  urinary retention , and is actually an urgent situation.  Proceed to your nearest Emergency department for evaluation (not an Urgent Care Center).  Sometimes the bladder does not work correctly after certain medications you receive during surgery, or related to certain procedures.  You may need to have a catheter placed until your bladder recovers.  When planning to go to an Emergency department, it may help to call the ER to let them know you are coming in for this problem after a surgery.  This may help you get in quicker to be evaluated.  **If you have symptoms of a urinary tract infection, please contact your primary physician for the proper evaluation and treatment.          If you have other questions, please call the office Monday thru Friday between 8am and 5pm to discuss with the nurse.  # [phone]    There is a surgeon ON CALL on weekday evenings and over the weekend in case of urgent need only, and may be contacted at the same number.    If you are having an emergency, call 911 or proceed  to your nearest emergency department.    Nausea and Vomiting: Nausea and vomiting can occur any time after receiving anesthesia. If you experience nausea and vomiting we encourage you to move to a clear liquid diet and advance your diet as tolerated. If nausea and vomiting do not improve within 12 hours please call the surgeon or present to the Emergency department.     Break-through Bleeding: If your experience bleeding from your surgical site apply pressure and additional dressing per nurse instruction. For simple problems such as a saturated dressing, you may need to reinforce the dressing with more gauze and tape and put slight pressure on the site. If bleeding does not subside contact the surgeon or present to the Emergency Department.    Post-op Infection: If you develop a fever of 100.4 or greater, have pus like drainage, redness, swelling or severe pain at the surgical site not alleviated with pain medications; please contact the surgeon or present to the Emergency Department.                          Same Day Surgery Discharge Instructions  Special Precautions After Surgery - Adult    1. It is not unusual to feel lightheaded or faint, up to 24 hours after surgery or while taking pain medication.  If you have these symptoms; sit for a few minutes before standing and have someone assist you when getting up.  2. You should rest and relax for the next 24 hours and must have someone stay with you for at least 24 hours after your discharge.  3. DO NOT DRIVE any vehicle or operate mechanical equipment for 24 hours following the end of your surgery.  DO NOT DRIVE while taking narcotic pain medications that have been prescribed by your physician.  If you had a limb operated on, you must be able to use it fully to drive.  4. DO NOT drink alcoholic beverages for 24 hours following surgery or while taking prescription pain medication.  5. Drink clear liquids (apple juice, ginger ale, broth, 7-Up, etc.).  Progress to  your regular diet as you feel able.  6. Any questions call your physician and do not make important decisions for 24 hours.  ?          Medications:  ? Oycodone:  Next dose: as needed or 6 hours from first dose=  ? Follow the instructions on the bottle.     Additional discharge instructions: as written per MD.  __________________________________________________________________________________________________________________________________  IMPORTANT NUMBERS:    [facility] [place] Main Number:  [phone], [phone]  Pharmacy:  [phone]  Same Day Surgery:  [phone], Monday - Friday until 8:30 p.m.  Urgent Care:  [phone]  Emergency Room:  [phone]      [facility]:  [phone]                                                                             [facility]:  [phone] option 1  [facility]:  [phone]     [facility]:  [phone]   [facility]:  [phone]   Home Medical Equipment: [phone]  [facility]:  [phone]

## 2021-02-22 NOTE — ANESTHESIA PREPROCEDURE EVALUATION
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     Meningitis, unspecified(322.9)     at 2 mos old, no long term complications      Past Surgical History:   Procedure Laterality Date     COLONOSCOPY  2014    Procedure: COMBINED COLONOSCOPY, SINGLE BIOPSY/POLYPECTOMY BY BIOPSY;  Colonoscopy;  Surgeon: Una Alcazar MD;  Location: [facility]     TONSILLECTOMY & ADENOIDECTOMY  about 1 1/1 yo      Allergies   Allergen Reactions     Ciprofloxacin Hives      Social History     Tobacco Use     Smoking status: Never Smoker     Smokeless tobacco: Never Used   Substance Use Topics     Alcohol use: Yes     Comment: social      Wt Readings from Last 1 Encounters:   21 65.8 kg (145 lb)        Anesthesia Evaluation   Pt has had prior anesthetic. Type: General.    No history of anesthetic complications       ROS/MED HX  ENT/Pulmonary:       Neurologic:       Cardiovascular:       METS/Exercise Tolerance: >4 METS    Hematologic:  - neg hematologic  ROS     Musculoskeletal:  - neg musculoskeletal ROS     GI/Hepatic:     (+) GERD, Asymptomatic on medication, Inflammatory bowel disease (Crohns),     Renal/Genitourinary: Comment: Hx ANÍBAL      Endo:  - neg endo ROS     Psychiatric/Substance Use:     (+) psychiatric history anxiety and depression     Infectious Disease:  - neg infectious disease ROS     Malignancy:  - neg malignancy ROS     Other: Comment: Osgood Schlatter             Physical Exam    Airway        Mallampati: I   TM distance: > 3 FB   Neck ROM: full   Mouth opening: > 3 cm    Respiratory Devices  and Support         Dental  no notable dental history         Cardiovascular   cardiovascular exam normal          Pulmonary   pulmonary exam normal                OUTSIDE LABS:  CBC:   Lab Results   Component Value Date    WBC 11.2 (H) 01/13/2021    WBC 13.5 (H) 08/28/2020    HGB 16.4 01/13/2021    HGB 17.0 08/28/2020    HCT 48.5 01/13/2021    HCT 52.2 08/28/2020     01/13/2021     08/28/2020     BMP:   Lab Results   Component Value Date     01/13/2021     08/28/2020    POTASSIUM 3.7 01/13/2021    POTASSIUM 3.9 08/28/2020    CHLORIDE 105 01/13/2021    CHLORIDE 105 08/28/2020    CO2 27 01/13/2021    CO2 29 08/28/2020    BUN 8 01/13/2021    BUN 8 08/28/2020    CR 1.00 01/13/2021    CR 1.02 08/28/2020    GLC 85 01/13/2021    GLC 74 08/28/2020     COAGS:   Lab Results   Component Value Date    INR 0.99 01/13/2021     POC: No results found for: BGM, HCG, HCGS  HEPATIC:   Lab Results   Component Value Date    ALBUMIN 3.6 01/13/2021    PROTTOTAL 7.5 01/13/2021    ALT 17 01/13/2021    AST 19 01/13/2021    GGT <10 02/04/2014    ALKPHOS 42 01/13/2021    BILITOTAL 0.6 01/13/2021     OTHER:   Lab Results   Component Value Date    LACT 1.5 01/13/2021    TEJAL 9.5 01/13/2021    PHOS 4.6 04/18/2008    LIPASE 56 (L) 01/13/2021    TSH 1.78 02/04/2014    CRP 4.3 01/13/2021    SED 6 04/11/2016       Anesthesia Plan    ASA Status:  2      Anesthesia Type: MAC.     - Reason for MAC: Deep or markedly invasive procedure (G8)              Consents    Anesthesia Plan(s) and associated risks, benefits, and realistic alternatives discussed. Questions answered and patient/representative(s) expressed understanding.     - Discussed with:  Patient      - Extended Intubation/Ventilatory Support Discussed: no Extended Intubation.      - Patient is DNR/DNI Status: No    Use of blood products discussed: No .     Postoperative Care    Pain management: IV analgesics, Oral pain medications.   PONV prophylaxis: Ondansetron (or  other 5HT-3), Dexamethasone or Solumedrol     Comments:                DAVID Enriquez CRNA

## 2021-02-22 NOTE — H&P
"Update 2/22/21:  Patient reports diminished drainage and discomfort from site.  He did establish care with GI with plans for endoscopy and medication initiation.  He was seen by colorectal surgery, patient preferred to have surgery at our facility for location.  This is reasonable.  He denies current abdominal pain, nausea, vomiting, fevers, chills, purulent drainage.  He denies cough or shortness of breath, previously had COVID-19 in 10/20.    /76   Temp 97.6  F (36.4  C) (Oral)   Resp 20   Ht 1.854 m (6' 1\")   Wt 65.8 kg (145 lb)   SpO2 98%   BMI 19.13 kg/m    Alert, appropriate  RRR  CTAB    To OR for rectal exam under anesthesia, seton placement.  I discussed the indications, risks, benefits, alternatives.  Risks discussed include but are not limited to: bleeding, infection, need for additional treatment, nontherapeutic intervention, wound complication (such as separation or wound closure, or stenosis/narrowing of anal canal), recurrence, incontinence to flatus or stool, urinary retention, difficult pain control after surgery, and rare complications related to surgery and/or anesthesia such as venous thromboembolism and cardiorespiratory complications.    He is agreeable to procedure and wishes to proceed.      He will see me in 1 month for follow-up with hopes of Crohn's control and endoscopy to be done by then.    Anil Bales,  on 2/22/2021 at 10:46 AM    1/19/21:  Surgical Consultation/History and Physical  [facility]     Carlyle is seen in consultation for perianal abscess, at the request of Smiley Rivera NP.     Chief Complaint:  Perianal abscess     History of Present Illness: Carlyle Archibald is a 20 year old male presents for follow-up after ED with perianal abscess and fistula.  Patient has a history of 2 similar episodes in the past; initial episode treated with I and D in the ED.  His last occurrence was managed with antibiotics alone.  He follows with " [facility] for [facility].  He has been on Humira and states he has not been as well controlled since that time.  He has not seen his gastroenterologist for this.      At present he complains of intermittent drainage from the area.  Denies fevers, chills, nausea, vomiting, chest pain or shortness of breath.  He denies any other abdominal pain at this time.  He is unsure of his last colonoscopy.         Patient Active Problem List   Diagnosis     Personal history of disease     Osgood-Schlatter's disease     GERD (gastroesophageal reflux disease)     Crohn's disease (H)     Perianal abscess     Substance abuse (H)     Major depressive disorder, single episode, mild (H)     Anxiety      Past Medical History        Past Medical History:   Diagnosis Date     Closed fracture of unspecified part of humerus 8/2005     Broken Left Arm     GN IgG mesangial        at age 2, has renal f/u at [facility] yearly , last visit in 06 , nl and in remission.     Meningitis, unspecified(322.9)       at 2 mos old, no long term complications            Past Surgical History         Past Surgical History:   Procedure Laterality Date     COLONOSCOPY   1/17/2014     Procedure: COMBINED COLONOSCOPY, SINGLE BIOPSY/POLYPECTOMY BY BIOPSY;  Colonoscopy;  Surgeon: Una Alcazar MD;  Location: [facility]     TONSILLECTOMY & ADENOIDECTOMY   about 1 1/3 yo            Family History         Family History   Problem Relation Age of Onset     Allergies Mother       Lipids Mother       Allergies Father       Gastrointestinal Disease Father           Crohn's disease     Lipids Maternal Grandmother       Cancer Maternal Grandmother       Lipids Maternal Grandfather       Cancer Maternal Grandfather       Diabetes Paternal Grandfather       Cancer Paternal Grandmother       Anxiety Disorder Sister              Social History           Tobacco Use     Smoking status: Never Smoker     Smokeless tobacco: Never Used   Substance Use Topics     Alcohol use: No  "            History   Drug Use No         Active Medications          Current Outpatient Medications   Medication Sig Dispense Refill     Acetaminophen (TYLENOL PO) Take 1,000 mg by mouth every 6 hours as needed for mild pain or fever         adalimumab (HUMIRA) 40 MG/0.8ML prefilled syringe kit Inject 40 mg Subcutaneous every 14 days         amoxicillin-clavulanate (AUGMENTIN) 875-125 MG tablet Take 1 tablet by mouth 2 times daily for 10 days 20 tablet 0     escitalopram (LEXAPRO) 10 MG tablet Take 0.5 tablets (5 mg) by mouth daily for 14 days, THEN 1 tablet (10 mg) daily. 30 tablet 1     metroNIDAZOLE (FLAGYL) 500 MG tablet Take 1 tablet (500 mg) by mouth 4 times daily for 10 days 40 tablet 0     oxyCODONE (ROXICODONE) 5 MG tablet Take 1 tablet (5 mg) by mouth every 6 hours as needed for pain 12 tablet 0            No Known Allergies     Review of Systems:   10 point ROS otherwise negative     Physical Exam:  /87 (BP Location: Right arm, Patient Position: Sitting, Cuff Size: Adult Large)   Pulse 123   Temp 96.8  F (36  C) (Tympanic)   Ht 1.854 m (6' 1\")   Wt 67.1 kg (147 lb 14.9 oz)   BMI 19.52 kg/m       Constitutional- No acute distress, well nourished, non-toxic  Eyes: Anicteric, no injection.  PERRL  ENT:  Normocephalic, atraumatic, Nose midline, moist mucus membranes  Neck - supple, no LAD, Thyroid smooth, symmetric, Carotids without bruits  Respiratory- Clear to auscultation bilaterally, good inspiratory effort  Cardiovascular - Heart RRR, no lift's, thrills, murmurs, rubs, or gallop.  No peripheral edema.  No clubbing.  Abdomen - Soft, non-tender, +BS, no hepatosplenomegaly, no palpable masses  Rectal - BETO deferred at this time due to discomfort; left lateral gluteal cleft there is chronic skin changes, no overt pore or fistula evident.  No fluctuance, mild erythema, no current drainage  Neuro - No focal neuro deficits, Alert and oriented x 3  Psych: Appropriate mood and affect  Musculoskeletal: " Normal gait, symmetric strength.  FROM upper and lower extremities.  Skin: Warm, Dry           Lab Results   Component Value Date     WBC 11.2 01/13/2021            Lab Results   Component Value Date     RBC 5.28 01/13/2021      Lab Results   Component Value Date     HGB 16.4 01/13/2021            Lab Results   Component Value Date     HCT 48.5 01/13/2021            Lab Results   Component Value Date     MCV 92 01/13/2021            Lab Results   Component Value Date     MCH 31.1 01/13/2021            Lab Results   Component Value Date     MCHC 33.8 01/13/2021            Lab Results   Component Value Date     RDW 13.5 01/13/2021            Lab Results   Component Value Date      01/13/2021      Last Comprehensive Metabolic Panel:        Sodium   Date Value Ref Range Status   01/13/2021 136 133 - 144 mmol/L Final            Potassium   Date Value Ref Range Status   01/13/2021 3.7 3.4 - 5.3 mmol/L Final            Chloride   Date Value Ref Range Status   01/13/2021 105 94 - 109 mmol/L Final            Carbon Dioxide   Date Value Ref Range Status   01/13/2021 27 20 - 32 mmol/L Final            Anion Gap   Date Value Ref Range Status   01/13/2021 4 3 - 14 mmol/L Final            Glucose   Date Value Ref Range Status   01/13/2021 85 70 - 99 mg/dL Final      Urea Nitrogen   Date Value Ref Range Status   01/13/2021 8 7 - 30 mg/dL Final            Creatinine   Date Value Ref Range Status   01/13/2021 1.00 0.66 - 1.25 mg/dL Final              GFR Estimate   Date Value Ref Range Status   01/13/2021 >90 >60 mL/min/[1.73_m2] Final       Comment:       Non  GFR Calc  Starting 12/18/2018, serum creatinine based estimated GFR (eGFR) will be   calculated using the Chronic Kidney Disease Epidemiology Collaboration   (CKD-EPI) equation.         Calcium   Date Value Ref Range Status   01/13/2021 9.5 8.5 - 10.1 mg/dL Final            Bilirubin Total   Date Value Ref Range Status   01/13/2021 0.6 0.2 - 1.3 mg/dL  Final            Alkaline Phosphatase   Date Value Ref Range Status   01/13/2021 42 40 - 150 U/L Final            ALT   Date Value Ref Range Status   01/13/2021 17 0 - 70 U/L Final            AST   Date Value Ref Range Status   01/13/2021 19 0 - 45 U/L Final      CT Abdomen/Pelvis:  EXAM: CT ABDOMEN PELVIS W CONTRAST  LOCATION: [facility]  DATE/TIME: 1/13/2021 6:45 PM     INDICATION: Perianal pain and swelling with bleeding and pus, history of Crohn's and perianal fistula.;  COMPARISON: Pelvic MRI 09/15/2020, CT abdomen pelvis 08/28/2020  TECHNIQUE: CT scan of the abdomen and pelvis was performed following injection of IV contrast. Multiplanar reformats were obtained. Dose reduction techniques were used.  CONTRAST: 72 ml Isovue 370     FINDINGS:   LOWER CHEST: Normal.     HEPATOBILIARY: Normal.     PANCREAS: Normal.     SPLEEN: Normal.     ADRENAL GLANDS: Normal.     KIDNEYS/BLADDER: Normal.     BOWEL: There is mild terminal ileum wall thickening and enhancement similar to prior exam. No evidence for bowel obstruction. No free air, free fluid, or inflammatory change.     There is redemonstration of left perineal abscess which measures approximately 2 cm.     LYMPH NODES: Normal.     VASCULATURE: Unremarkable.     PELVIC ORGANS: Normal.     MUSCULOSKELETAL: Normal.                                                                      IMPRESSION:   1.  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 Overall size at approximately 2 cm is unchanged. No new abscess.  2.  Chronic recurrent mild terminal ileal inflammation.     Assessment:  1. Fistula-in-ano    2. Crohn's disease of both small and large intestine with fistula (H)

## 2021-02-22 NOTE — ANESTHESIA POSTPROCEDURE EVALUATION
Patient: Carlyle Archibald    Procedure(s):  EXAM UNDER ANESTHESIA, RECTUM,    Diagnosis:Fistula-in-ano [K60.3]  Diagnosis Additional Information: No value filed.    Anesthesia Type:  MAC    Note:  Disposition: Outpatient   Postop Pain Control: Uneventful            Sign Out: Well controlled pain   PONV: No   Neuro/Psych: Uneventful            Sign Out: Acceptable/Baseline neuro status   Airway/Respiratory: Uneventful            Sign Out: Acceptable/Baseline resp. status   CV/Hemodynamics: Uneventful            Sign Out: Acceptable CV status   Other NRE: NONE   DID A NON-ROUTINE EVENT OCCUR?          Last vitals:  Vitals:    02/22/21 1145 02/22/21 1200 02/22/21 1215   BP: 96/44 98/47 99/51   Pulse: 78 73 56   Resp: 16 18 16   Temp:  36.2  C (97.2  F)    SpO2: 96% 97% 97%       Last vitals prior to Anesthesia Care Transfer:  CRNA VITALS  2/22/2021 1100 - 2/22/2021 1200      2/22/2021             Resp Rate (observed):  (!) 4          Electronically Signed By: DAVID Enriquez CRNA  February 22, 2021  12:33 PM

## 2021-02-22 NOTE — ANESTHESIA CARE TRANSFER NOTE
Patient: Carlyle Archibald    Procedure(s):  EXAM UNDER ANESTHESIA, RECTUM,    Diagnosis: Fistula-in-ano [K60.3]  Diagnosis Additional Information: No value filed.    Anesthesia Type:   MAC     Note:    Oropharynx: oropharynx clear of all foreign objects  Level of Consciousness: drowsy  Oxygen Supplementation: room air    Independent Airway: airway patency not satisfactory and stable  Dentition: dentition changed  Vital Signs Stable: post-procedure vital signs reviewed and stable  Report to RN Given: handoff report given  Patient transferred to: Phase II    Handoff Report: Identifed the Patient, Identified the Reponsible Provider, Reviewed the pertinent medical history, Discussed the surgical course, Reviewed Intra-OP anesthesia mangement and issues during anesthesia, Set expectations for post-procedure period and Allowed opportunity for questions and acknowledgement of understanding      Vitals: (Last set prior to Anesthesia Care Transfer)  CRNA VITALS  2/22/2021 1100 - 2/22/2021 1200      2/22/2021             Resp Rate (observed):  (!) 4        Electronically Signed By: DAVID Enriquez CRNA  February 22, 2021  12:32 PM

## 2021-02-23 ENCOUNTER — PATIENT OUTREACH (OUTPATIENT)
Dept: CARE COORDINATION | Facility: CLINIC | Age: 21
End: 2021-02-23

## 2021-02-23 NOTE — PROGRESS NOTES
Clinic Care Coordination Contact  Chart entered to review for potential care coordination outreach after identification on risk report.  Outreach needed: No  Reason: Patient tested positive for COVID previously so surgery was postponed. Patient was able to have outpatient procedure done yesterday. No concerns. No CC outreach needed.     DAFNE Terrell/MELECIO Care Coordination Supervisor  [facility] - Care Coordination   2/23/2021 9:15 AM  [phone]

## 2021-03-23 ENCOUNTER — TRANSFERRED RECORDS (OUTPATIENT)
Dept: HEALTH INFORMATION MANAGEMENT | Facility: CLINIC | Age: 21
End: 2021-03-23

## 2021-04-15 ENCOUNTER — TRANSFERRED RECORDS (OUTPATIENT)
Dept: HEALTH INFORMATION MANAGEMENT | Facility: CLINIC | Age: 21
End: 2021-04-15

## 2021-06-01 ENCOUNTER — TRANSFERRED RECORDS (OUTPATIENT)
Dept: HEALTH INFORMATION MANAGEMENT | Facility: CLINIC | Age: 21
End: 2021-06-01

## 2021-06-01 LAB
ALT SERPL-CCNC: 23 U/L (ref 0–78)
AST SERPL-CCNC: 20 U/L (ref 0–37)

## 2021-07-20 ENCOUNTER — OFFICE VISIT (OUTPATIENT)
Dept: FAMILY MEDICINE | Facility: CLINIC | Age: 21
End: 2021-07-20
Payer: COMMERCIAL

## 2021-07-20 VITALS
DIASTOLIC BLOOD PRESSURE: 80 MMHG | HEART RATE: 67 BPM | BODY MASS INDEX: 18.77 KG/M2 | HEIGHT: 73 IN | TEMPERATURE: 97.6 F | SYSTOLIC BLOOD PRESSURE: 114 MMHG | WEIGHT: 141.6 LBS | RESPIRATION RATE: 16 BRPM | OXYGEN SATURATION: 97 %

## 2021-07-20 DIAGNOSIS — F41.9 ANXIETY: Primary | ICD-10-CM

## 2021-07-20 DIAGNOSIS — F32.0 MILD MAJOR DEPRESSION (H): ICD-10-CM

## 2021-07-20 PROCEDURE — 99214 OFFICE O/P EST MOD 30 MIN: CPT | Performed by: NURSE PRACTITIONER

## 2021-07-20 RX ORDER — BUPROPION HYDROCHLORIDE 150 MG/1
150 TABLET ORAL EVERY MORNING
Qty: 30 TABLET | Refills: 3 | Status: SHIPPED | OUTPATIENT
Start: 2021-07-20 | End: 2023-06-20

## 2021-07-20 ASSESSMENT — ANXIETY QUESTIONNAIRES
7. FEELING AFRAID AS IF SOMETHING AWFUL MIGHT HAPPEN: SEVERAL DAYS
3. WORRYING TOO MUCH ABOUT DIFFERENT THINGS: MORE THAN HALF THE DAYS
GAD7 TOTAL SCORE: 10
5. BEING SO RESTLESS THAT IT IS HARD TO SIT STILL: NOT AT ALL
IF YOU CHECKED OFF ANY PROBLEMS ON THIS QUESTIONNAIRE, HOW DIFFICULT HAVE THESE PROBLEMS MADE IT FOR YOU TO DO YOUR WORK, TAKE CARE OF THINGS AT HOME, OR GET ALONG WITH OTHER PEOPLE: SOMEWHAT DIFFICULT
6. BECOMING EASILY ANNOYED OR IRRITABLE: NEARLY EVERY DAY
2. NOT BEING ABLE TO STOP OR CONTROL WORRYING: MORE THAN HALF THE DAYS
1. FEELING NERVOUS, ANXIOUS, OR ON EDGE: SEVERAL DAYS

## 2021-07-20 ASSESSMENT — MIFFLIN-ST. JEOR: SCORE: 1696.17

## 2021-07-20 ASSESSMENT — PATIENT HEALTH QUESTIONNAIRE - PHQ9
5. POOR APPETITE OR OVEREATING: SEVERAL DAYS
SUM OF ALL RESPONSES TO PHQ QUESTIONS 1-9: 13

## 2021-07-20 NOTE — PROGRESS NOTES
Assessment & Plan     Anxiety  Patient with history of anxiety- now recurrent - failed previous therapy due to side effects   - Start  buPROPion (WELLBUTRIN XL) 150 MG 24 hr tablet; Take 1 tablet (150 mg) by mouth every morning  - MENTAL HEALTH REFERRAL  - Adult; Psychiatry; Psychiatry; [facility]: [facility] Psychiatry Service/Bridge to Long-Term Psychiatry as indicated ([phone]); No - Patient must be evaluated prior to placing referral OR document reason for refer...; Future    Mild major depression (H)  Patient with history of depression- now recurrent - failed previous therapy due to side effects   - MENTAL HEALTH REFERRAL  - Adult; Psychiatry; Psychiatry; G: [facility] Psychiatry Service/Bridge to Long-Term Psychiatry as indicated ([phone]); No - Patient must be evaluated prior to placing referral OR document reason for refer...; Future   Start  buPROPion (WELLBUTRIN XL) 150 MG 24 hr tablet; Take 1 tablet (150 mg) by mouth every morning  Patient was given [employer] ([facility] [place]) card to schedule appointment for counseling     Follow up in 2-3 months or sooner prn     :[id number]}         No follow-ups on file.    DAVID [facility] IZZY Barahona is a 20 year old who presents for the following health issues     HPI     Abnormal Mood Symptoms  Onset/Duration: symptoms have been on and off for years, worse recently  Description: short tempered  Depression (if yes, do PHQ-9): YES  Anxiety (if yes, do CLRAITZA-7): YES  Accompanying Signs & Symptoms:  Still participating in activities that you used to enjoy: YES  Fatigue: YES, thinks mostly from work  Irritability: YES  Difficulty concentrating: YES  Changes in appetite: YES, not eating as much  Problems with sleep: no  Heart racing/beating fast: YES  Abnormally elevated, expansive, or irritable mood: YES  Persistently increased activity or energy: no  Thoughts of hurting yourself or others:  no  History:  Recent stress or major life event: YES- friends father passed recently  Prior depression or anxiety: yes, treated with fluoxetine in the past  Family history of depression or anxiety: YES- both parents  Alcohol/drug use: YES- weekend alcohol use  Difficulty sleeping: no  Precipitating or alleviating factors: something to do, focus on other things  Therapies tried and outcome: none, did do therapy a few years ago    Per patient's chart- patient has tried Zoloft, Fluoxetine and Lexapro- patient feels like  He stopped taking these due to side effects of loss of appetite and nausea.   Patient feels like he is sleeping well at night.   Patient has tried counseling in the past.   PHQ 6/14/2019 8/14/2019 12/19/2019   PHQ-9 Total Score 9 17 18   Q9: Thoughts of better off dead/self-harm past 2 weeks Not at all More than half the days Not at all     CLARITZA-7 SCORE 6/14/2019 8/14/2019 12/19/2019   Total Score - - -   Total Score 8 10 11         Review of Systems   Constitutional, HEENT, cardiovascular, pulmonary, GI, , musculoskeletal, neuro, skin, endocrine and psych systems are negative, except as otherwise noted.      Objective    There were no vitals taken for this visit.  There is no height or weight on file to calculate BMI.  Physical Exam   GENERAL APPEARANCE: healthy, alert and no distress  PSYCH: mentation appears normal and affect normal/bright

## 2021-07-20 NOTE — PATIENT INSTRUCTIONS
Thank you for choosing [facility].  You may be receiving an email and/or telephone survey request from [facility] Customer Experience regarding your visit today.  Please take a few minutes to respond to the survey to let us know how we are doing.      If you have questions or concerns, please contact us via [employer] or you can contact your care team at [phone] option 2.    Our Clinic hours are:  Monday - Thursday 7am-6pm  Friday 7am-5pm    The [place] outpatient lab hours are:  Monday - Friday 7am-4:30pm    Appointments are required, call [phone]    If you have clinical questions after hours or would like to schedule an appointment,  call the clinic at [phone].

## 2021-07-21 ASSESSMENT — ANXIETY QUESTIONNAIRES: GAD7 TOTAL SCORE: 10

## 2021-09-21 ENCOUNTER — TRANSFERRED RECORDS (OUTPATIENT)
Dept: HEALTH INFORMATION MANAGEMENT | Facility: CLINIC | Age: 21
End: 2021-09-21

## 2021-09-21 LAB
ALT SERPL-CCNC: 15 IU/L (ref 0–44)
AST SERPL-CCNC: 30 IU/L (ref 0–40)
CREATININE (EXTERNAL): 1.22 MG/DL (ref 0.76–1.27)
GFR ESTIMATED (EXTERNAL): 83 ML/MIN/1.73M2
GFR ESTIMATED (IF AFRICAN AMERICAN) (EXTERNAL): 96 ML/MIN/1.73M2
GLUCOSE (EXTERNAL): 84 MG/DL (ref 65–99)
POTASSIUM (EXTERNAL): 5.4 MMOL/L (ref 3.5–5.2)

## 2021-10-05 ENCOUNTER — TRANSFERRED RECORDS (OUTPATIENT)
Dept: HEALTH INFORMATION MANAGEMENT | Facility: CLINIC | Age: 21
End: 2021-10-05

## 2021-12-22 ENCOUNTER — TRANSFERRED RECORDS (OUTPATIENT)
Dept: HEALTH INFORMATION MANAGEMENT | Facility: CLINIC | Age: 21
End: 2021-12-22
Payer: COMMERCIAL

## 2022-10-26 DIAGNOSIS — K50.80 CROHN'S DISEASE OF BOTH SMALL AND LARGE INTESTINE WITHOUT COMPLICATION (H): Primary | ICD-10-CM

## 2022-11-01 ENCOUNTER — TRANSFERRED RECORDS (OUTPATIENT)
Dept: HEALTH INFORMATION MANAGEMENT | Facility: CLINIC | Age: 22
End: 2022-11-01

## 2022-11-01 LAB
ALT SERPL-CCNC: 21 IU/L (ref 0–44)
AST SERPL-CCNC: 29 IU/L (ref 0–40)
CREATININE (EXTERNAL): 1.15 MG/DL (ref 0.76–1.27)
GFR ESTIMATED (EXTERNAL): 91 ML/MIN/1.73
GLUCOSE (EXTERNAL): 81 MG/DL (ref 70–99)
POTASSIUM (EXTERNAL): 4.5 MMOL/L (ref 3.5–5.2)

## 2023-04-15 ENCOUNTER — HEALTH MAINTENANCE LETTER (OUTPATIENT)
Age: 23
End: 2023-04-15

## 2023-06-19 ENCOUNTER — TELEPHONE (OUTPATIENT)
Dept: FAMILY MEDICINE | Facility: CLINIC | Age: 23
End: 2023-06-19
Payer: COMMERCIAL

## 2023-06-19 NOTE — TELEPHONE ENCOUNTER
Patient was instructed to return call to [facility] main line at [phone] to reschedule appt.    Lesli Welsh RN  [facility]      
Please call patient,  He needs to be seen in clinic by a provider.    He is scheduled to see me virtually tomorrow 6/20/2023.    He needs to establish care with a new provider.  Last provider visit here was nearly 2 years ago.    Sincerely,  Jerson Horne MD    
Admission Reconciliation is Completed  Discharge Reconciliation is Completed

## 2023-06-20 ENCOUNTER — VIRTUAL VISIT (OUTPATIENT)
Dept: FAMILY MEDICINE | Facility: CLINIC | Age: 23
End: 2023-06-20
Payer: COMMERCIAL

## 2023-06-20 DIAGNOSIS — K50.119 CROHN'S DISEASE OF LARGE INTESTINE WITH COMPLICATION (H): Primary | ICD-10-CM

## 2023-06-20 DIAGNOSIS — F32.0 MILD MAJOR DEPRESSION (H): ICD-10-CM

## 2023-06-20 PROCEDURE — 99214 OFFICE O/P EST MOD 30 MIN: CPT | Mod: VID | Performed by: FAMILY MEDICINE

## 2023-06-20 RX ORDER — VENLAFAXINE HYDROCHLORIDE 37.5 MG/1
CAPSULE, EXTENDED RELEASE ORAL
Qty: 60 CAPSULE | Refills: 1 | Status: SHIPPED | OUTPATIENT
Start: 2023-06-20 | End: 2024-02-19

## 2023-06-20 ASSESSMENT — ANXIETY QUESTIONNAIRES
IF YOU CHECKED OFF ANY PROBLEMS ON THIS QUESTIONNAIRE, HOW DIFFICULT HAVE THESE PROBLEMS MADE IT FOR YOU TO DO YOUR WORK, TAKE CARE OF THINGS AT HOME, OR GET ALONG WITH OTHER PEOPLE: SOMEWHAT DIFFICULT
6. BECOMING EASILY ANNOYED OR IRRITABLE: NEARLY EVERY DAY
2. NOT BEING ABLE TO STOP OR CONTROL WORRYING: NEARLY EVERY DAY
GAD7 TOTAL SCORE: 18
7. FEELING AFRAID AS IF SOMETHING AWFUL MIGHT HAPPEN: MORE THAN HALF THE DAYS
5. BEING SO RESTLESS THAT IT IS HARD TO SIT STILL: MORE THAN HALF THE DAYS
GAD7 TOTAL SCORE: 18
1. FEELING NERVOUS, ANXIOUS, OR ON EDGE: NEARLY EVERY DAY
3. WORRYING TOO MUCH ABOUT DIFFERENT THINGS: NEARLY EVERY DAY

## 2023-06-20 ASSESSMENT — PATIENT HEALTH QUESTIONNAIRE - PHQ9
SUM OF ALL RESPONSES TO PHQ QUESTIONS 1-9: 22
5. POOR APPETITE OR OVEREATING: MORE THAN HALF THE DAYS

## 2023-06-20 NOTE — PATIENT INSTRUCTIONS
Please start the Venlafaxine 37.5 mg once a day for 2 weeks and then increase to 2 capules a day.    Follow up with Smiley Rivera NP in about 3 weeks.  I will have Izabella call you to set up an appointment.    I will have Angelique our [facility] therapist here reach out to discuss depression and anxiety.    If your symptoms get worse and have thought about harming yourself or anyone else, please go to the emergency department.          Thank you for choosing [facility].  You may be receiving an email and/or telephone survey request from [facility] [employer] Customer Experience regarding your visit today.  Please take a few minutes to respond to the survey to let us know how we are doing.      If you have questions or concerns, please contact us via [employer] or you can contact your care team at [phone] option 2.    Our Clinic hours are:  Monday - Thursday 7am-6pm  Friday 7am-5pm    The [place] outpatient lab hours are:  Monday - Friday 7am-4:30pm    Appointments are required, call [phone]    If you have clinical questions after hours or would like to schedule an appointment,  call the clinic at [phone].

## 2023-06-20 NOTE — NURSING NOTE
Left a voice mail on the primary number to contact the office back to schedule an appointment per Dr. Horne. 06/20/2023 at 9:30 AM

## 2023-06-20 NOTE — PROGRESS NOTES
Jun is a 22 year old who is being evaluated via a billable video visit.      How would you like to obtain your AVS? Mail a copy  If the video visit is dropped, the invitation should be resent by: Text to cell phone: [phone]  Will anyone else be joining your video visit? No        Assessment & Plan     Mild major depression (H)  Patient has struggled with depression for years.  He has tried fluoxetine, sertraline and bupropion medications.  He is using Delta 9 CBD supplement.  He finds that his mood is still down.  Not using any other drugs.  He is open to talking to a counselor and trying a new medication.    Patient is not suicidal.  Reviewed with him if this should occur to go to the ED.    Start a new trial of venlafaxine medication.  Information given.  Follow up with PCP in 3 weeks to check for improvement.  I will also send a message to Angelique to see him too.   - venlafaxine (EFFEXOR XR) 37.5 MG 24 hr capsule; Take 1 capsule (37.5 mg) by mouth daily for 14 days, THEN 2 capsules (75 mg) daily for 14 days.               Depression Screening Follow Up        6/20/2023     8:37 AM   PHQ   PHQ-9 Total Score 22   Q9: Thoughts of better off dead/self-harm past 2 weeks More than half the days         6/20/2023     8:37 AM   Last PHQ-9   1.  Little interest or pleasure in doing things 1   2.  Feeling down, depressed, or hopeless 3   3.  Trouble falling or staying asleep, or sleeping too much 3   4.  Feeling tired or having little energy 3   5.  Poor appetite or overeating 3   6.  Feeling bad about yourself 3   7.  Trouble concentrating 2   8.  Moving slowly or restless 2   Q9: Thoughts of better off dead/self-harm past 2 weeks 2   PHQ-9 Total Score 22   Difficulty at work, home, or with people Very difficult               Follow Up      Follow Up Actions Taken  Crisis resource information provided in the After Visit Summary  Consult with [facility], monitored patient safety and notified provider.  Patient to follow up  with PCP.  Clinic staff to schedule appointment if able.    Discussed the following ways the patient can remain in a safe environment:        Jerson Horne MD  [facility] Bar[facility]ona is a 22 year old, presenting for the following health issues:  Video Visit (Mental health)        6/20/2023     8:34 AM   Additional Questions   Roomed by Izabelal HEWITT CMA   Accompanied by Self         6/20/2023     8:34 AM   Patient Reported Additional Medications   Patient reports taking the following new medications None     HPI     Depression and Anxiety Follow-Up    How are you doing with your depression since your last visit? Worsened     How are you doing with your anxiety since your last visit?  Worsened     Are you having other symptoms that might be associated with depression or anxiety? No    Have you had a significant life event? No ; nothing recent just prolonging concerns    Do you have any concerns with your use of alcohol or other drugs? No    Social History     Tobacco Use     Smoking status: Never     Passive exposure: Never     Smokeless tobacco: Never   Vaping Use     Vaping status: Some Days     Substances: Nicotine     Devices: Pre-filled pod     Passive vaping exposure: Yes   Substance Use Topics     Alcohol use: Yes     Comment: social     Drug use: No         12/19/2019     2:42 PM 7/20/2021     7:29 AM 6/20/2023     8:37 AM   PHQ   PHQ-9 Total Score 18 13 22   Q9: Thoughts of better off dead/self-harm past 2 weeks Not at all Not at all More than half the days         12/19/2019     2:42 PM 7/20/2021     7:29 AM 6/20/2023     8:41 AM   CLARITZA-7 SCORE   Total Score 11 10 18         6/20/2023     8:37 AM   Last PHQ-9   1.  Little interest or pleasure in doing things 1   2.  Feeling down, depressed, or hopeless 3   3.  Trouble falling or staying asleep, or sleeping too much 3   4.  Feeling tired or having little energy 3   5.  Poor appetite or overeating 3   6.  Feeling bad about  yourself 3   7.  Trouble concentrating 2   8.  Moving slowly or restless 2   Q9: Thoughts of better off dead/self-harm past 2 weeks 2   PHQ-9 Total Score 22   Difficulty at work, home, or with people Very difficult         6/20/2023     8:41 AM   CLARITZA-7    1. Feeling nervous, anxious, or on edge 3   2. Not being able to stop or control worrying 3   3. Worrying too much about different things 3   4. Trouble relaxing 2   5. Being so restless that it is hard to sit still 2   6. Becoming easily annoyed or irritable 3   7. Feeling afraid, as if something awful might happen 2   CLARITZA-7 Total Score 18   If you checked any problems, how difficult have they made it for you to do your work, take care of things at home, or get along with other people? Somewhat difficult         Follow Up Actions Taken  Crisis resource information provided in the After Visit Summary  Consult with [facility] [place], monitored patient safety and notified provider.  Patient to follow up with PCP.  Clinic staff to schedule appointment if able.     Discussed the following ways the patient can remain in a safe environment:    Suicide Assessment Five-step Evaluation and Treatment (SAFE-T)        How many servings of fruits and vegetables do you eat daily?  2-3    On average, how many sweetened beverages do you drink each day (Examples: soda, juice, sweet tea, etc.  Do NOT count diet or artificially sweetened beverages)?   2-3    How many days per week do you exercise enough to make your heart beat faster? 7    How many minutes a day do you exercise enough to make your heart beat faster? 30 - 60    How many days per week do you miss taking your medication? 0          Review of Systems   Patient has had depression for years.  He has been on 3 meds before.  Willing to try another med.  He has GI issues so many of the medication cause stomach issues.  He is open to talking with a counselor.     Not using an other substances.  For supplement he is using Delta 9  CBD.    Followed by [facility] for Crohns disease and on Stelara.        Objective    Vitals - Patient Reported  Pain Score: No Pain (0)        Physical Exam   GENERAL: Healthy, alert and no distress  EYES: Eyes grossly normal to inspection.  No discharge or erythema, or obvious scleral/conjunctival abnormalities.  RESP: No audible wheeze, cough, or visible cyanosis.  No visible retractions or increased work of breathing.    SKIN: Visible skin clear. No significant rash, abnormal pigmentation or lesions.  NEURO: Cranial nerves grossly intact.  Mentation and speech appropriate for age.  PSYCH: Mentation appears normal, affect normal/bright, judgement and insight intact, normal speech and appearance well-groomed.                Video-Visit Details    Type of service:  Video Visit     Originating Location (pt. Location): Home  Distant Location (provider location):  Off-site  Platform used for Video Visit: Alberto

## 2023-06-23 ENCOUNTER — MYC MEDICAL ADVICE (OUTPATIENT)
Dept: BEHAVIORAL HEALTH | Facility: CLINIC | Age: 23
End: 2023-06-23
Payer: COMMERCIAL

## 2023-06-23 ENCOUNTER — TELEPHONE (OUTPATIENT)
Dept: BEHAVIORAL HEALTH | Facility: CLINIC | Age: 23
End: 2023-06-23
Payer: COMMERCIAL

## 2023-07-03 ENCOUNTER — APPOINTMENT (OUTPATIENT)
Dept: URBAN - METROPOLITAN AREA CLINIC 260 | Age: 23
Setting detail: DERMATOLOGY
End: 2023-07-04

## 2023-07-03 VITALS — HEIGHT: 60 IN | WEIGHT: 150 LBS

## 2023-07-03 DIAGNOSIS — D22 MELANOCYTIC NEVI: ICD-10-CM

## 2023-07-03 DIAGNOSIS — B35.4 TINEA CORPORIS: ICD-10-CM

## 2023-07-03 DIAGNOSIS — L81.4 OTHER MELANIN HYPERPIGMENTATION: ICD-10-CM

## 2023-07-03 DIAGNOSIS — H01.13 ECZEMATOUS DERMATITIS OF EYELID: ICD-10-CM

## 2023-07-03 DIAGNOSIS — Z71.89 OTHER SPECIFIED COUNSELING: ICD-10-CM

## 2023-07-03 DIAGNOSIS — D49.2 NEOPLASM OF UNSPECIFIED BEHAVIOR OF BONE, SOFT TISSUE, AND SKIN: ICD-10-CM

## 2023-07-03 PROBLEM — H01.136 ECZEMATOUS DERMATITIS OF LEFT EYE, UNSPECIFIED EYELID: Status: ACTIVE | Noted: 2023-07-03

## 2023-07-03 PROBLEM — D22.5 MELANOCYTIC NEVI OF TRUNK: Status: ACTIVE | Noted: 2023-07-03

## 2023-07-03 PROBLEM — H01.133 ECZEMATOUS DERMATITIS OF RIGHT EYE, UNSPECIFIED EYELID: Status: ACTIVE | Noted: 2023-07-03

## 2023-07-03 PROCEDURE — 11102 TANGNTL BX SKIN SINGLE LES: CPT

## 2023-07-03 PROCEDURE — OTHER EDUCATIONAL RESOURCES PROVIDED: OTHER

## 2023-07-03 PROCEDURE — OTHER MIPS QUALITY: OTHER

## 2023-07-03 PROCEDURE — OTHER BIOPSY BY SHAVE METHOD: OTHER

## 2023-07-03 PROCEDURE — 99204 OFFICE O/P NEW MOD 45 MIN: CPT | Mod: 25

## 2023-07-03 PROCEDURE — OTHER PRESCRIPTION MEDICATION MANAGEMENT: OTHER

## 2023-07-03 PROCEDURE — OTHER PHOTO-DOCUMENTATION: OTHER

## 2023-07-03 PROCEDURE — OTHER COUNSELING: OTHER

## 2023-07-03 PROCEDURE — OTHER PRESCRIPTION: OTHER

## 2023-07-03 RX ORDER — HYDROCORTISONE 25 MG/G
2.5% OINTMENT TOPICAL BID
Qty: 28.35 | Refills: 1 | Status: ERX | COMMUNITY
Start: 2023-07-03

## 2023-07-03 RX ORDER — TRIAMCINOLONE ACETONIDE 1 MG/G
0.1% CREAM TOPICAL BID
Qty: 30 | Refills: 1 | Status: ERX | COMMUNITY
Start: 2023-07-03

## 2023-07-03 RX ORDER — KETOCONAZOLE 20 MG/G
2% CREAM TOPICAL BID
Qty: 30 | Refills: 1 | Status: ERX | COMMUNITY
Start: 2023-07-03

## 2023-07-03 ASSESSMENT — LOCATION SIMPLE DESCRIPTION DERM
LOCATION SIMPLE: LEFT EYELID
LOCATION SIMPLE: LEFT UPPER BACK
LOCATION SIMPLE: RIGHT AXILLA
LOCATION SIMPLE: RIGHT EYELID
LOCATION SIMPLE: LOWER BACK
LOCATION SIMPLE: UPPER BACK
LOCATION SIMPLE: LEFT AXILLARY VAULT

## 2023-07-03 ASSESSMENT — LOCATION DETAILED DESCRIPTION DERM
LOCATION DETAILED: INFERIOR THORACIC SPINE
LOCATION DETAILED: RIGHT ANTERIOR AXILLA
LOCATION DETAILED: LEFT LATERAL CANTHUS
LOCATION DETAILED: SUPERIOR LUMBAR SPINE
LOCATION DETAILED: LEFT MEDIAL UPPER BACK
LOCATION DETAILED: RIGHT LATERAL CANTHUS
LOCATION DETAILED: LEFT AXILLARY VAULT

## 2023-07-03 ASSESSMENT — LOCATION ZONE DERM
LOCATION ZONE: EYELID
LOCATION ZONE: TRUNK
LOCATION ZONE: AXILLAE

## 2023-07-03 NOTE — PROCEDURE: PHOTO-DOCUMENTATION
Photo Preface (Leave Blank If You Do Not Want): Photographs of affected skin were obtained today
Details (Free Text): Back
Detail Level: Zone

## 2023-07-03 NOTE — PROCEDURE: MIPS QUALITY
Detail Level: Detailed
Quality 110: Preventive Care And Screening: Influenza Immunization: Influenza Immunization Ordered or Recommended, but not Administered due to system reason
Quality 226: Preventive Care And Screening: Tobacco Use: Screening And Cessation Intervention: Patient screened for tobacco use, is a smoker AND received Cessation Counseling within measurement period or in the six months prior to the measurement period
Quality 130: Documentation Of Current Medications In The Medical Record: Current Medications Documented
Quality 431: Preventive Care And Screening: Unhealthy Alcohol Use - Screening: Patient not identified as an unhealthy alcohol user when screened for unhealthy alcohol use using a systematic screening method

## 2023-07-03 NOTE — PROCEDURE: PRESCRIPTION MEDICATION MANAGEMENT
Render In Strict Bullet Format?: Yes
Initiate Treatment: Ketoconazole 2% cream BID, mix with Triamcinolone 0.1% cream
Plan: Follow up in
Detail Level: Zone
Initiate Treatment: Hydrocortisone 2.5% ointment BID x 2 weeks

## 2023-07-03 NOTE — PROCEDURE: BIOPSY BY SHAVE METHOD
Detail Level: Detailed
Depth Of Biopsy: dermis
Was A Bandage Applied: Yes
Size Of Lesion In Cm: 0
Biopsy Type: H and E
Biopsy Method: Dermablade
Anesthesia Type: 1% lidocaine with epinephrine
Anesthesia Volume In Cc (Will Not Render If 0): 0.3
Hemostasis: Drysol
Wound Care: Petrolatum
Dressing: bandage
Destruction After The Procedure: No
Type Of Destruction Used: Curettage
Curettage Text: The wound bed was treated with curettage after the biopsy was performed.
Cryotherapy Text: The wound bed was treated with cryotherapy after the biopsy was performed.
Electrodesiccation Text: The wound bed was treated with electrodesiccation after the biopsy was performed.
Electrodesiccation And Curettage Text: The wound bed was treated with electrodesiccation and curettage after the biopsy was performed.
Silver Nitrate Text: The wound bed was treated with silver nitrate after the biopsy was performed.
Lab: -[id number]
Path Notes (To The Dermatopathologist): Please confirm margins
Consent: Written consent was obtained and risks were reviewed including but not limited to scarring, infection, bleeding, scabbing, incomplete removal, nerve damage and allergy to anesthesia.
Post-Care Instructions: I reviewed with the patient in detail post-care instructions. Patient is to keep the biopsy site dry overnight, and then apply bacitracin twice daily until healed. Patient may apply hydrogen peroxide soaks to remove any crusting.
Notification Instructions: Patient will be notified of biopsy results. However, patient instructed to call the office if not contacted within 2 weeks.
Billing Type: Third-Party Bill
Information: Selecting Yes will display possible errors in your note based on the variables you have selected. This validation is only offered as a suggestion for you. PLEASE NOTE THAT THE VALIDATION TEXT WILL BE REMOVED WHEN YOU FINALIZE YOUR NOTE. IF YOU WANT TO FAX A PRELIMINARY NOTE YOU WILL NEED TO TOGGLE THIS TO 'NO' IF YOU DO NOT WANT IT IN YOUR FAXED NOTE.

## 2023-07-06 ENCOUNTER — TRANSFERRED RECORDS (OUTPATIENT)
Dept: HEALTH INFORMATION MANAGEMENT | Facility: CLINIC | Age: 23
End: 2023-07-06
Payer: COMMERCIAL

## 2023-07-10 ENCOUNTER — TRANSFERRED RECORDS (OUTPATIENT)
Dept: HEALTH INFORMATION MANAGEMENT | Facility: CLINIC | Age: 23
End: 2023-07-10
Payer: COMMERCIAL

## 2023-07-10 LAB
ALT SERPL-CCNC: 22 IU/L (ref 0–44)
AST SERPL-CCNC: 30 IU/L (ref 0–40)

## 2023-08-15 ENCOUNTER — APPOINTMENT (OUTPATIENT)
Dept: URBAN - METROPOLITAN AREA CLINIC 260 | Age: 23
Setting detail: DERMATOLOGY
End: 2023-08-16

## 2023-08-15 VITALS — HEIGHT: 72 IN | WEIGHT: 150 LBS

## 2023-08-15 DIAGNOSIS — B35.4 TINEA CORPORIS: ICD-10-CM

## 2023-08-15 PROCEDURE — 99213 OFFICE O/P EST LOW 20 MIN: CPT

## 2023-08-15 PROCEDURE — OTHER COUNSELING: OTHER

## 2023-08-15 PROCEDURE — OTHER PRESCRIPTION: OTHER

## 2023-08-15 RX ORDER — KETOCONAZOLE 20 MG/G
2% CREAM TOPICAL BID
Qty: 30 | Refills: 1 | Status: ERX

## 2023-08-15 RX ORDER — TRIAMCINOLONE ACETONIDE 1 MG/G
0.1% CREAM TOPICAL BID
Qty: 30 | Refills: 1 | Status: ERX

## 2023-08-15 ASSESSMENT — BSA RASH: BSA RASH: 1

## 2023-08-15 ASSESSMENT — LOCATION ZONE DERM: LOCATION ZONE: AXILLAE

## 2023-08-15 ASSESSMENT — LOCATION DETAILED DESCRIPTION DERM
LOCATION DETAILED: RIGHT ANTERIOR AXILLA
LOCATION DETAILED: LEFT AXILLARY VAULT

## 2023-08-15 ASSESSMENT — LOCATION SIMPLE DESCRIPTION DERM
LOCATION SIMPLE: RIGHT AXILLA
LOCATION SIMPLE: LEFT AXILLARY VAULT

## 2023-08-15 ASSESSMENT — SEVERITY ASSESSMENT: SEVERITY: ALMOST CLEAR

## 2023-08-15 NOTE — PROCEDURE: COUNSELING
Patient Specific Counseling (Will Not Stick From Patient To Patient): - Continue TMC 0.1% and Ketoconazole 2% for flares PRN\\n- F/U in 1 year if well controlled
Detail Level: Zone

## 2023-09-12 ENCOUNTER — TRANSFERRED RECORDS (OUTPATIENT)
Dept: HEALTH INFORMATION MANAGEMENT | Facility: CLINIC | Age: 23
End: 2023-09-12
Payer: COMMERCIAL

## 2023-10-23 ENCOUNTER — TELEPHONE (OUTPATIENT)
Dept: FAMILY MEDICINE | Facility: CLINIC | Age: 23
End: 2023-10-23
Payer: COMMERCIAL

## 2023-10-24 NOTE — TELEPHONE ENCOUNTER
[place] unemployment insurance form prepared and routed to Smiley Rivera for review. Patient may need appointment in order to complete form.    Complex Repair And Z Plasty Text: The defect edges were debeveled with a #15 scalpel blade.  The primary defect was closed partially with a complex linear closure.  Given the location of the remaining defect, shape of the defect and the proximity to free margins a Z plasty was deemed most appropriate for complete closure of the defect.  Using a sterile surgical marker, an appropriate advancement flap was drawn incorporating the defect and placing the expected incisions within the relaxed skin tension lines where possible.    The area thus outlined was incised deep to adipose tissue with a #15 scalpel blade.  The skin margins were undermined to an appropriate distance in all directions utilizing iris scissors.

## 2023-10-26 NOTE — TELEPHONE ENCOUNTER
Completed most of the form.  Can we call patient and get an end date for out of work?  If still not working then needs to schedule a follow up with me or another provider to reassess.    If seeing GI - send me next appointment date.  His paperwork says unable to work from 10/14 through ?    Smiley Rivera, DNP

## 2023-11-02 NOTE — TELEPHONE ENCOUNTER
Please attempt a call during business hours. Patient has not logged onto My Chart account in over a year.

## 2023-11-03 NOTE — TELEPHONE ENCOUNTER
Encounter was closed without contacting patient per my request...Closed By: Izabella Lowery RN on 11/2/23 at 12:42 PM       Form was signed by Smiley Rivera and mailed to patient per patient's initial request. Copy sent to scan and copy placed in cabinet.

## 2024-01-14 ENCOUNTER — HOSPITAL ENCOUNTER (EMERGENCY)
Facility: CLINIC | Age: 24
Discharge: HOME | End: 2024-01-14
Attending: EMERGENCY MEDICINE | Admitting: EMERGENCY MEDICINE
Payer: COMMERCIAL

## 2024-01-14 VITALS
OXYGEN SATURATION: 96 % | HEIGHT: 73 IN | TEMPERATURE: 97.5 F | DIASTOLIC BLOOD PRESSURE: 87 MMHG | HEART RATE: 83 BPM | RESPIRATION RATE: 20 BRPM | BODY MASS INDEX: 19.22 KG/M2 | SYSTOLIC BLOOD PRESSURE: 124 MMHG | WEIGHT: 145 LBS

## 2024-01-14 DIAGNOSIS — S02.5XXA CLOSED FRACTURE OF TOOTH, INITIAL ENCOUNTER: ICD-10-CM

## 2024-01-14 DIAGNOSIS — S01.81XA CHIN LACERATION, INITIAL ENCOUNTER: ICD-10-CM

## 2024-01-14 DIAGNOSIS — R55 SYNCOPE AND COLLAPSE: ICD-10-CM

## 2024-01-14 PROCEDURE — 250N000011 HC RX IP 250 OP 636: Performed by: EMERGENCY MEDICINE

## 2024-01-14 PROCEDURE — 99284 EMERGENCY DEPT VISIT MOD MDM: CPT | Mod: 25 | Performed by: EMERGENCY MEDICINE

## 2024-01-14 PROCEDURE — 99283 EMERGENCY DEPT VISIT LOW MDM: CPT | Mod: 25

## 2024-01-14 PROCEDURE — 12011 RPR F/E/E/N/L/M 2.5 CM/<: CPT | Performed by: EMERGENCY MEDICINE

## 2024-01-14 PROCEDURE — 90471 IMMUNIZATION ADMIN: CPT | Performed by: EMERGENCY MEDICINE

## 2024-01-14 PROCEDURE — 12011 RPR F/E/E/N/L/M 2.5 CM/<: CPT

## 2024-01-14 PROCEDURE — 90715 TDAP VACCINE 7 YRS/> IM: CPT | Performed by: EMERGENCY MEDICINE

## 2024-01-14 RX ADMIN — CLOSTRIDIUM TETANI TOXOID ANTIGEN (FORMALDEHYDE INACTIVATED), CORYNEBACTERIUM DIPHTHERIAE TOXOID ANTIGEN (FORMALDEHYDE INACTIVATED), BORDETELLA PERTUSSIS TOXOID ANTIGEN (GLUTARALDEHYDE INACTIVATED), BORDETELLA PERTUSSIS FILAMENTOUS HEMAGGLUTININ ANTIGEN (FORMALDEHYDE INACTIVATED), BORDETELLA PERTUSSIS PERTACTIN ANTIGEN, AND BORDETELLA PERTUSSIS FIMBRIAE 2/3 ANTIGEN 0.5 ML: 5; 2; 2.5; 5; 3; 5 INJECTION, SUSPENSION INTRAMUSCULAR at 09:55

## 2024-01-14 ASSESSMENT — ACTIVITIES OF DAILY LIVING (ADL): ADLS_ACUITY_SCORE: 35

## 2024-01-14 NOTE — ED PROVIDER NOTES
History     Chief Complaint   Patient presents with    Syncope    Laceration     HPI  Carlyle Archibald is a 23 year old male with history of Crohn's disease, who presents for evaluation of laceration under chin in context of syncopal event.  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  He started to feel lightheaded and woozy and excused himself.  He took a few steps in the next thing he woke up on the floor with a coworker standing over him.  No history of seizures and no reported tonic-clonic movements.  No postictal state.  No neck pain.  Did chip a lower left molar (18).       The patient's PMHx, Surgical Hx, Allergies, and Medications were all reviewed with the patient.    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 Provider to review          Past Medical History:    Past Medical History:   Diagnosis Date    Closed fracture of unspecified part of humerus 8/2005    GN IgG mesangial      Meningitis, unspecified(322.9)        Past Surgical History:    Past Surgical History:   Procedure Laterality Date    COLONOSCOPY  1/17/2014    Procedure: COMBINED COLONOSCOPY, SINGLE BIOPSY/POLYPECTOMY BY BIOPSY;  Colonoscopy;  Surgeon: Una Alcazar  "MD Reina;  Location: [facility]    EXAM UNDER ANESTHESIA, FISTULOTOMY RECTUM, COMBINED N/A 2/22/2021    Procedure: EXAM UNDER ANESTHESIA, RECTUM,;  Surgeon: Anil Bales DO;  Location: [facility]    TONSILLECTOMY & ADENOIDECTOMY  about 1 1/3 yo       Family History:    Family History   Problem Relation Age of Onset    Allergies Mother     Lipids Mother     Allergies Father     Gastrointestinal Disease Father         Crohn's disease    Lipids Maternal Grandmother     Cancer Maternal Grandmother     Lipids Maternal Grandfather     Cancer Maternal Grandfather     Diabetes Paternal Grandfather     Cancer Paternal Grandmother     Anxiety Disorder Sister        Social History:  Marital Status:  Single [1]  Social History     Tobacco Use    Smoking status: Never     Passive exposure: Never    Smokeless tobacco: Never   Vaping Use    Vaping Use: Some days    Substances: Nicotine    Devices: Pre-filled pod   Substance Use Topics    Alcohol use: Yes     Comment: social    Drug use: No        Medications:    ustekinumab (STELARA) 90 MG/ML  venlafaxine (EFFEXOR XR) 37.5 MG 24 hr capsule          Review of Systems  Pertinent positives and negatives mentioned in HPI    Physical Exam   BP: (!) 112/90  Pulse: 89  Temp: 97.5  F (36.4  C)  Resp: 18  Height: 185.4 cm (6' 1\")  Weight: 65.8 kg (145 lb)  SpO2: 97 %    Physical Exam  GEN: Awake, alert, and cooperative. No acute distress.  Resting comfortably in semireclined position  HENT: 3 cm linear laceration under chin.  No other overt signs of trauma.  Small chip to tooth 18.  No facial tenderness.  No hemotympanum.  No otorrhea.  No retroauricular ecchymosis or tenderness.  EYES: EOM intact. Conjunctiva clear. No discharge.  No RAPD  NECK: Symmetric, freely mobile.  No posterior tenderness  CV : Regular rate and rhythm.  No murmurs appreciated  PULM: Normal effort.  Speaking in full sentences  NEURO: Normal speech. Following commands. CN II-XII grossly intact. Answering " questions and interacting appropriately.   EXT: No gross deformity. Warm and well perfused.  INT: Warm. No diaphoresis. See HENT above.     ED Course        [facility]    -Laceration Repair    Date/Time: 1/14/2024 10:34 AM    Performed by: Juan Grijalva MD  Authorized by: Juan Grijalva MD    Risks, benefits and alternatives discussed.      ANESTHESIA (see MAR for exact dosages):     Anesthesia method:  Local infiltration    Local anesthetic:  Bupivacaine 0.25% w/o epi  LACERATION DETAILS     Location: Chin.    Length (cm):  3    Depth (mm):  2    REPAIR TYPE:     Repair type:  Simple    EXPLORATION:     Hemostasis achieved with:  Direct pressure    Wound exploration: entire depth of wound probed and visualized      Wound extent: no foreign body      Contaminated: no      TREATMENT:     Irrigation solution:  Sterile saline    Irrigation volume:  500    Visualized foreign bodies/material removed: no      SKIN REPAIR     Repair method:  Sutures    Suture size:  4-0    Suture material:  Nylon    Suture technique:  Simple interrupted    Number of sutures:  10    APPROXIMATION     Approximation:  Close    POST-PROCEDURE DETAILS     Dressing:  Antibiotic ointment             EKG: Interpreted by Juan Grijalva MD sinus rhythm with rate of 72 bpm.  Normal axis.  Normal R wave progression.  Normal intervals.  Subtle J-point elevation diffusely consistent with benign early repolarization.  No delta wave.  No signs of HOCM.  No signs of ARVD.  No signs of Brugada.  Impression: Sinus rhythm with BERNARDINO       Critical Care time:  none               No results found for this or any previous visit (from the past 24 hour(s)).    Medications   Tdap (tetanus-diphtheria-acell pertussis) (ADACEL) injection 0.5 mL (0.5 mLs Intramuscular $Given 1/14/24 0955)       Assessments & Plan (with Medical Decision Making)   23 year old male with past medical history of Crohn's disease with syncopal event  and resultant chipped tooth and 3 cm laceration under chin.  Patient had prodromal symptoms which would support vasovagal episode and make cardiac syncope less likely.  EKG with no signs of WPW, HOCM, ARVD, Brugada or QT prolongation.  No postictal state or reported seizure-like activity, no history of seizures, very low suspicion for seizures.  Tetanus updated.  Laceration repaired primarily.  Do not feel head imaging is necessary.  This would be supported by [place] rules.  No neck pain. Type I fracture of tooth 18, no urgent follow up. Can see dentist on non emergent basis, Will need 10 stitches removed in 7 days.  Like him to discuss syncope with his primary provider.  ED return precautions discussed.         I have reviewed the nursing notes.         New Prescriptions    No medications on file       Final diagnoses:   Syncope and collapse   Chin laceration, initial encounter   Closed fracture of tooth, initial encounter     Juan Grijalva MD        1/14/2024   [facility] EMERGENCY DEPT    Disclaimer: This note consists of words and symbols derived from keyboarding and dictation using voice recognition software.  As a result, there may be errors that have gone undetected.  Please consider this when interpreting information found in this note.               Juan Grijalva MD  01/14/24 1033       Juan Grijalva MD  01/14/24 1033

## 2024-01-14 NOTE — ED TRIAGE NOTES
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 He started to feel warm and went to walk to the bathroom. Made it about ~ 5 steps and fell to the ground. Awoke lying in prone position. Laceration to under chin. ~ 4 cm. Bleeding controlled. Denies neck or head pain. Reports hx of chron's disease. Did not eat this morning. Had syncopal episodes when he was younger (~ 4 yo). Needs updated tetanus.      Triage Assessment (Adult)       Row Name 01/14/24 0822          Triage Assessment    Airway WDL WDL        Respiratory WDL    Respiratory WDL WDL        Skin Circulation/Temperature WDL    Skin Circulation/Temperature WDL X        Cardiac WDL    Cardiac WDL WDL        Peripheral/Neurovascular WDL    Peripheral Neurovascular WDL WDL        Pupils (CN II)    Pupil PERRLA yes     Pupil Size Left 5 mm     Pupil Size Right 5 mm        [place] Coma Scale    Best Eye Response 4-->(E4) spontaneous     Best Motor Response 6-->(M6) obeys commands     Best Verbal Response 5-->(V5) oriented     David Coma Scale Score 15

## 2024-01-14 NOTE — DISCHARGE INSTRUCTIONS
Please keep the wound clean and dry for 24-48 hours. The affected area should be kept elevated as much as possible.  After 24 hours, the dressing may be removed and the wound may be gently cleaned with warm water and soap.  You may apply petroleum based ointment as desired.  You should return in 7 days to your primary care physician or the emergency department for suture removal.    Any time the skin is damaged, scar formation is unavoidable. You can minimize the scar by following the above instructions and preventing infection. The scar will continue to evolve for at least 1 year. Final appearance of the scar will not be known until at least that time. Please apply sun screen to the scar before any sun exposure for the first year as sunburn or even tanning may cause the scar to become discolored and lead to poor cosmetic outcomes. If after 1 year, you are unhappy with the appearance of the scar you should seek follow-up with the appropriate health care professional to discuss further options.    You should return to the emergency department or your primary care physician immediately if you develop warmth, redness, swelling, drainage from the wound, or have fevers.    If you have additional episodes of fainting, chest pain, trouble breathing, feeling of irregular heartbeat, return to ED for further evaluation. Otherwise, follow up with your primary care provider.

## 2024-01-17 ENCOUNTER — TRANSFERRED RECORDS (OUTPATIENT)
Dept: HEALTH INFORMATION MANAGEMENT | Facility: CLINIC | Age: 24
End: 2024-01-17
Payer: COMMERCIAL

## 2024-01-29 ENCOUNTER — TRANSFERRED RECORDS (OUTPATIENT)
Dept: HEALTH INFORMATION MANAGEMENT | Facility: CLINIC | Age: 24
End: 2024-01-29
Payer: COMMERCIAL

## 2024-01-29 LAB
ALT SERPL-CCNC: 28 IU/L (ref 0–44)
AST SERPL-CCNC: 40 IU/L (ref 0–40)

## 2024-02-08 ENCOUNTER — HOSPITAL ENCOUNTER (OUTPATIENT)
Dept: MRI IMAGING | Facility: CLINIC | Age: 24
Discharge: HOME | End: 2024-02-08
Attending: INTERNAL MEDICINE | Admitting: INTERNAL MEDICINE
Payer: COMMERCIAL

## 2024-02-08 DIAGNOSIS — K50.813 CROHN'S DISEASE OF BOTH SMALL AND LARGE INTESTINE WITH FISTULA (H): ICD-10-CM

## 2024-02-08 PROCEDURE — 255N000002 HC RX 255 OP 636: Performed by: INTERNAL MEDICINE

## 2024-02-08 PROCEDURE — A9585 GADOBUTROL INJECTION: HCPCS | Performed by: INTERNAL MEDICINE

## 2024-02-08 PROCEDURE — 250N000011 HC RX IP 250 OP 636: Performed by: INTERNAL MEDICINE

## 2024-02-08 PROCEDURE — 74183 MRI ABD W/O CNTR FLWD CNTR: CPT

## 2024-02-08 PROCEDURE — 258N000003 HC RX IP 258 OP 636: Performed by: INTERNAL MEDICINE

## 2024-02-08 RX ORDER — GADOBUTROL 604.72 MG/ML
6.5 INJECTION INTRAVENOUS ONCE
Status: COMPLETED | OUTPATIENT
Start: 2024-02-08 | End: 2024-02-08

## 2024-02-08 RX ADMIN — SODIUM CHLORIDE 50 ML: 9 INJECTION, SOLUTION INTRAVENOUS at 16:01

## 2024-02-08 RX ADMIN — GLUCAGON 0.5 MG: 1 INJECTION, POWDER, LYOPHILIZED, FOR SOLUTION INTRAMUSCULAR; INTRAVENOUS at 15:40

## 2024-02-08 RX ADMIN — GADOBUTROL 6.5 ML: 604.72 INJECTION INTRAVENOUS at 16:00

## 2024-02-09 ENCOUNTER — TRANSFERRED RECORDS (OUTPATIENT)
Dept: HEALTH INFORMATION MANAGEMENT | Facility: CLINIC | Age: 24
End: 2024-02-09
Payer: COMMERCIAL

## 2024-02-14 ENCOUNTER — TRANSFERRED RECORDS (OUTPATIENT)
Dept: HEALTH INFORMATION MANAGEMENT | Facility: CLINIC | Age: 24
End: 2024-02-14
Payer: COMMERCIAL

## 2024-02-16 ENCOUNTER — OFFICE VISIT (OUTPATIENT)
Dept: FAMILY MEDICINE | Facility: CLINIC | Age: 24
End: 2024-02-16
Payer: COMMERCIAL

## 2024-02-16 VITALS
OXYGEN SATURATION: 98 % | HEIGHT: 72 IN | HEART RATE: 113 BPM | RESPIRATION RATE: 20 BRPM | TEMPERATURE: 98.1 F | SYSTOLIC BLOOD PRESSURE: 118 MMHG | BODY MASS INDEX: 20.06 KG/M2 | WEIGHT: 148.1 LBS | DIASTOLIC BLOOD PRESSURE: 74 MMHG

## 2024-02-16 DIAGNOSIS — L02.215 PERINEAL ABSCESS: Primary | ICD-10-CM

## 2024-02-16 PROCEDURE — 99213 OFFICE O/P EST LOW 20 MIN: CPT | Performed by: STUDENT IN AN ORGANIZED HEALTH CARE EDUCATION/TRAINING PROGRAM

## 2024-02-16 RX ORDER — RISANKIZUMAB-RZAA 60 MG/ML
INJECTION INTRAVENOUS
COMMUNITY
Start: 2024-01-17

## 2024-02-16 ASSESSMENT — ANXIETY QUESTIONNAIRES
GAD7 TOTAL SCORE: 7
IF YOU CHECKED OFF ANY PROBLEMS ON THIS QUESTIONNAIRE, HOW DIFFICULT HAVE THESE PROBLEMS MADE IT FOR YOU TO DO YOUR WORK, TAKE CARE OF THINGS AT HOME, OR GET ALONG WITH OTHER PEOPLE: SOMEWHAT DIFFICULT
7. FEELING AFRAID AS IF SOMETHING AWFUL MIGHT HAPPEN: SEVERAL DAYS
3. WORRYING TOO MUCH ABOUT DIFFERENT THINGS: SEVERAL DAYS
5. BEING SO RESTLESS THAT IT IS HARD TO SIT STILL: SEVERAL DAYS
7. FEELING AFRAID AS IF SOMETHING AWFUL MIGHT HAPPEN: SEVERAL DAYS
4. TROUBLE RELAXING: SEVERAL DAYS
GAD7 TOTAL SCORE: 7
2. NOT BEING ABLE TO STOP OR CONTROL WORRYING: SEVERAL DAYS
8. IF YOU CHECKED OFF ANY PROBLEMS, HOW DIFFICULT HAVE THESE MADE IT FOR YOU TO DO YOUR WORK, TAKE CARE OF THINGS AT HOME, OR GET ALONG WITH OTHER PEOPLE?: SOMEWHAT DIFFICULT
6. BECOMING EASILY ANNOYED OR IRRITABLE: SEVERAL DAYS
GAD7 TOTAL SCORE: 7
1. FEELING NERVOUS, ANXIOUS, OR ON EDGE: SEVERAL DAYS

## 2024-02-16 ASSESSMENT — PATIENT HEALTH QUESTIONNAIRE - PHQ9
10. IF YOU CHECKED OFF ANY PROBLEMS, HOW DIFFICULT HAVE THESE PROBLEMS MADE IT FOR YOU TO DO YOUR WORK, TAKE CARE OF THINGS AT HOME, OR GET ALONG WITH OTHER PEOPLE: SOMEWHAT DIFFICULT
SUM OF ALL RESPONSES TO PHQ QUESTIONS 1-9: 12
SUM OF ALL RESPONSES TO PHQ QUESTIONS 1-9: 12

## 2024-02-16 ASSESSMENT — PAIN SCALES - GENERAL: PAINLEVEL: SEVERE PAIN (6)

## 2024-02-16 ASSESSMENT — ENCOUNTER SYMPTOMS: NERVOUS/ANXIOUS: 1

## 2024-02-16 NOTE — PROGRESS NOTES
Assessment & Plan     Perineal abscesses   > Patient has noted abscess for many years, did undergo I&D in the past, ever since being on immunosuppressant (Skyrizi) patient has noted abscesses have been worsening  -Patient in the past did have abscess in the same area and did undergo I&D, but states that it was incredibly painful for him and that the localized numbing medication (lidocaine?) did not help to alleviate the pain  -Normally in clinic I would feel comfortable performing an incision and drainage, however given his previous experience to  I&D, and the location/size of the abscess would appreciate expertise from surgical specialty  -Since he is immunocompromised and the size of the abscess has been worsening, will send prescription over for antibiotics  -Prescription sent for amoxicillin-clavulanate (AUGMENTIN) 875-125 MG tablet; Take 1 tablet by mouth 2 times daily for 7 days  - Adult General Surg Referral; Future  -Strict ER and return precautions discussed  - The risks, benefits and treatment options of prescribed medications or other treatments have been discussed with the patient. The patient verbalized their understanding and should call or follow up if no improvement or if they develop further problems      Adalberto Barahona is a 23 year old, presenting for the following health issues:  Recheck Medication (Concerns that the Skyrizi is making his abscess  worse) and Abscess        2/16/2024     2:24 PM   Additional Questions   Roomed by Betzy BAILEY LPN   Accompanied by self         2/16/2024     2:24 PM   Patient Reported Additional Medications   Patient reports taking the following new medications no new meds     History of Present Illness       Reason for visit:  Abssece  Symptom onset:  3-7 days ago (ever since he started his new medication for Chron's the abscess has flared up and gotten painful.)  Symptoms include:  Pain, Perianal abscess  Symptom intensity:  Moderate  Symptom progression:   "Staying the same  Had these symptoms before:  Yes  Has tried/received treatment for these symptoms:  Yes  Previous treatment was successful:  Yes  What makes it worse:  None  What makes it better:  None    He eats 0-1 servings of fruits and vegetables daily.He consumes 1 sweetened beverage(s) daily.He exercises with enough effort to increase his heart rate 9 or less minutes per day.  He exercises with enough effort to increase his heart rate 3 or less days per week.   He is taking medications regularly.     Torrey has been improving his Chron's symptoms   Has had this abscess before, states it has been there for a long time (since he was diagnosed with chron's 13 years ago)    He did undergo I&D at the time which helped \"for a while\" but he has noticed that since starting the Skyrizi the abscess has grown and become more painful     Review of Systems   Constitutional:  Negative for chills and fever.   HENT:  Negative for ear pain.    Eyes:  Negative for pain.   Respiratory:  Negative for cough.    Cardiovascular:  Negative for chest pain.   Gastrointestinal:  Negative for abdominal pain.   Genitourinary:  Negative for dysuria.   Musculoskeletal:  Negative for neck pain.   Skin:  Negative for rash. Positive for abscess  Neurological:  Negative for headaches.         Objective    /74 (BP Location: Right arm, Patient Position: Sitting, Cuff Size: Adult Regular)   Pulse 113   Temp 98.1  F (36.7  C) (Tympanic)   Resp 20   Ht 1.83 m (6' 0.05\")   Wt 67.2 kg (148 lb 1.6 oz)   SpO2 98%   BMI 20.06 kg/m    Body mass index is 20.06 kg/m .  Physical Exam  Genitourinary:         Comments: Areas marked and red is where patient was noted to have abscesses, the abscess along the perineum was larger than the one noted at the base of the scrotum   Both abscesses were firm, tender to palpation, felt somewhat indurated, but still slightly mobile             Signed Electronically by: KATHERIN HALL MD    "

## 2024-02-17 ENCOUNTER — MYC MEDICAL ADVICE (OUTPATIENT)
Dept: FAMILY MEDICINE | Facility: CLINIC | Age: 24
End: 2024-02-17
Payer: COMMERCIAL

## 2024-02-17 ENCOUNTER — TELEPHONE (OUTPATIENT)
Dept: NURSING | Facility: CLINIC | Age: 24
End: 2024-02-17
Payer: COMMERCIAL

## 2024-02-17 NOTE — TELEPHONE ENCOUNTER
Pt calling with concerns about    This morning the abscess ruptured   8/10 pain - constant   Tylenol and ibuprofen is not helping  Requesting a stronger pain medication    Advised Pt triage cannot call [facility] providers for pain medications.  If pain is uncontrollable Pt should go to ED for help getting it under control    Pt also states he will send a mychart msg to PCP.    Lashanda Bartlett RN, Nurse Advisor 4:44 PM 2/17/2024

## 2024-02-19 ENCOUNTER — OFFICE VISIT (OUTPATIENT)
Dept: SURGERY | Facility: CLINIC | Age: 24
End: 2024-02-19
Attending: STUDENT IN AN ORGANIZED HEALTH CARE EDUCATION/TRAINING PROGRAM
Payer: COMMERCIAL

## 2024-02-19 VITALS
DIASTOLIC BLOOD PRESSURE: 92 MMHG | HEART RATE: 96 BPM | TEMPERATURE: 98.2 F | WEIGHT: 148 LBS | SYSTOLIC BLOOD PRESSURE: 122 MMHG | BODY MASS INDEX: 20.05 KG/M2

## 2024-02-19 DIAGNOSIS — F32.0 MILD MAJOR DEPRESSION (H): ICD-10-CM

## 2024-02-19 DIAGNOSIS — L02.215 PERINEAL ABSCESS: ICD-10-CM

## 2024-02-19 DIAGNOSIS — K50.119 CROHN'S DISEASE OF LARGE INTESTINE WITH COMPLICATION (H): Primary | ICD-10-CM

## 2024-02-19 DIAGNOSIS — F41.9 ANXIETY: ICD-10-CM

## 2024-02-19 PROCEDURE — 99243 OFF/OP CNSLTJ NEW/EST LOW 30: CPT | Performed by: STUDENT IN AN ORGANIZED HEALTH CARE EDUCATION/TRAINING PROGRAM

## 2024-02-19 ASSESSMENT — PAIN SCALES - GENERAL: PAINLEVEL: MILD PAIN (3)

## 2024-02-19 NOTE — NURSING NOTE
"Initial BP (!) 122/92 (BP Location: Right arm, Patient Position: Chair, Cuff Size: Adult Regular)   Pulse 96   Temp 98.2  F (36.8  C) (Tympanic)   Wt 67.1 kg (148 lb)   BMI 20.05 kg/m   Estimated body mass index is 20.05 kg/m  as calculated from the following:    Height as of 2/16/24: 1.83 m (6' 0.05\").    Weight as of this encounter: 67.1 kg (148 lb). .  Alla Mccann LPN    "

## 2024-02-19 NOTE — PATIENT INSTRUCTIONS
Plan:   1. Continue amoxicillin as prescribed  2. Referral placed for colorectal surgery evaluation   3. Follow up with gastroentrologist for continued medical management of Crohn's disease

## 2024-02-19 NOTE — TELEPHONE ENCOUNTER
[facility] to provider.   Patient was seen last week and referred to general surgery for abscess, will see them this afternoon.    Is there something besides Tylenol and ibuprofen you can prescribe in the meantime?  Appears preferred pharmacy is [facility].     Lesli Welsh RN  [facility]

## 2024-02-19 NOTE — PROGRESS NOTES
Surgical Consultation/History and Physical  [facility] Surgery    Carlyle is seen in consultation for perianal abscesses, at the request of Smiley Rivera DNP.    Chief Complaint:  Crohn's disease    History of Present Illness: Carlyle Archibald is a 23 year old male presents with a history of Crohn's and recurrent perianal abscesses.  He states that he was first diagnosed with Crohn's about 10 years ago.  He previously required incision and drainage of perianal abscesses about 6 years ago, which was very uncomfortable for him.  He was seen by his primary care provider last week for evaluation of perianal abscesses.  Incision and drainage was not attempted at that time and patient was started on amoxicillin.  Since then, he states that the pain and swelling has improved as the abscesses have spontaneously drained.  Denies any fevers or chills, no nausea or emesis, states that he has had regular, nonbloody bowel movements.  He does note that he was recently transition from stelara to skyrizi about 1 month ago. Has had multiple upper and lower endoscopies before, most recently in 2021.  Has not had abdominal surgery for his Crohn's disease in the past.  He states he follows with the gastroenterology team at [facility], but does not regularly see a colorectal surgeon.    Patient Active Problem List   Diagnosis    Personal history of disease    Osgood-Schlatter's disease    GERD (gastroesophageal reflux disease)    Perianal abscess    Mild major depression (H24)    Anxiety    Fistula-in-ano    Crohn's disease of large intestine with complication (H)       Past Medical History:   Diagnosis Date    Closed fracture of unspecified part of humerus 8/2005    Broken Left Arm    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    Meningitis, unspecified(322.9)     at 2 mos old, no long term complications       Past Surgical History:   Procedure Laterality Date     COLONOSCOPY  1/17/2014    Procedure: COMBINED COLONOSCOPY, SINGLE BIOPSY/POLYPECTOMY BY BIOPSY;  Colonoscopy;  Surgeon: Una Alcazar MD;  Location: [facility]    EXAM UNDER ANESTHESIA, FISTULOTOMY RECTUM, COMBINED N/A 2/22/2021    Procedure: EXAM UNDER ANESTHESIA, RECTUM,;  Surgeon: Anil Bales DO;  Location: [facility]    TONSILLECTOMY & ADENOIDECTOMY  about 1 1/3 yo       Family History   Problem Relation Age of Onset    Allergies Mother     Lipids Mother     Allergies Father     Gastrointestinal Disease Father         Crohn's disease    Lipids Maternal Grandmother     Cancer Maternal Grandmother     Lipids Maternal Grandfather     Cancer Maternal Grandfather     Diabetes Paternal Grandfather     Cancer Paternal Grandmother     Anxiety Disorder Sister        Social History     Tobacco Use    Smoking status: Never     Passive exposure: Never    Smokeless tobacco: Never   Substance Use Topics    Alcohol use: Yes     Comment: social        History   Drug Use No       Current Outpatient Medications   Medication Sig Dispense Refill    amoxicillin-clavulanate (AUGMENTIN) 875-125 MG tablet Take 1 tablet by mouth 2 times daily for 7 days 14 tablet 0    risankizumab-rzaa (SKYRIZI) 600 MG/10ML injection Administer Skyrizi 600mg every 4 weeks by IV route      ustekinumab (STELARA) 90 MG/ML Inject 90 mg Subcutaneous Every 8 weeks (Patient not taking: Reported on 2/16/2024)      venlafaxine (EFFEXOR XR) 37.5 MG 24 hr capsule Take 1 capsule (37.5 mg) by mouth daily for 14 days, THEN 2 capsules (75 mg) daily for 14 days. 60 capsule 1       Allergies   Allergen Reactions    Ciprofloxacin Hives    Sulfamethizole Rash       Review of Systems:   10 point ROS negative other than what was listed in HPI physical Exam:  There were no vitals taken for this visit.    Constitutional- No acute distress, well nourished, non-toxic  Eyes: Anicteric, no injection.  PERRL  ENT:  Normocephalic, atraumatic, Nose midline, moist  mucus membranes  Neck - supple, no LAD  Respiratory- Nonlabored breathing on room air  Cardiovascular - Extremities warm and well perfused.  Abdomen - Soft, non-tender, no hepatosplenomegaly, no palpable masses  Rectal -multiple perianal sinus tracts as well as induration without fluctuance, primarily on left anterior perianal skin.  Small draining area on left perianal skin without bleeding.  Digital rectal exam with good tone, no masses, guaiac negative.  No appreciable lesions or bleeding visualized on anoscopy.  Neuro - No focal neuro deficits, Alert and oriented x 3  Psych: Appropriate mood and affect  Musculoskeletal: Normal gait, symmetric strength.  FROM upper and lower extremities.  Skin: Warm, Dry    Assessment:  1. Carlyle Archibald is a 23 year old male with past medical history significant for Crohn's disease who presents for evaluation of chronic, recurrent perianal abscesses.  On exam, these appear to be noninflamed and have spontaneously started draining.  No indication for urgent incision and drainage.  He has been taking amoxicillin as prescribed by his primary care provider.    No indication for urgent surgical intervention at this time given the abscesses have spontaneously drained, however I recommend that the patient be evaluated by colorectal surgery to at least establish care in the event that he will eventually need surgical intervention for his Crohn's disease.  He was advised to follow-up with his gastroenterologist for ongoing medical management of his Crohn's disease, as well as to discuss timing of further surveillance colonoscopies.  Patient expressed understanding and is amenable to the proposed plan.    Plan:   1. Continue amoxicillin as prescribed  2. Referral placed for colorectal surgery evaluation   3. Follow up with gastroentrologist for continued medical management of Crohn's disease        Adair Jordan MD on 2/19/2024 at 3:19 PM

## 2024-02-19 NOTE — LETTER
2/19/2024         RE: Carlyle Archibald  [address] [id number]        Dear Colleague,    Thank you for referring your patient, Carlyle Archibald, to the [facility]. Please see a copy of my visit note below.    Surgical Consultation/History and Physical  [facility]    Carlyle is seen in consultation for perianal abscesses, at the request of Smiley Rivera DNP.    Chief Complaint:  Crohn's disease    History of Present Illness: Carlyle Archibald is a 23 year old male presents with a history of Crohn's and recurrent perianal abscesses.  He states that he was first diagnosed with Crohn's about 10 years ago.  He previously required incision and drainage of perianal abscesses about 6 years ago, which was very uncomfortable for him.  He was seen by his primary care provider last week for evaluation of perianal abscesses.  Incision and drainage was not attempted at that time and patient was started on amoxicillin.  Since then, he states that the pain and swelling has improved as the abscesses have spontaneously drained.  Denies any fevers or chills, no nausea or emesis, states that he has had regular, nonbloody bowel movements.  He does note that he was recently transition from stelara to skyrizi about 1 month ago. Has had multiple upper and lower endoscopies before, most recently in 2021.  Has not had abdominal surgery for his Crohn's disease in the past.  He states he follows with the gastroenterology team at [facility], but does not regularly see a colorectal surgeon.    Patient Active Problem List   Diagnosis     Personal history of disease     Osgood-Schlatter's disease     GERD (gastroesophageal reflux disease)     Perianal abscess     Mild major depression (H24)     Anxiety     Fistula-in-ano     Crohn's disease of large intestine with complication (H)       Past Medical History:   Diagnosis Date     Closed fracture of unspecified part of humerus 8/2005    Broken  Left Arm     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     Meningitis, unspecified(322.9)     at 2 mos old, no long term complications       Past Surgical History:   Procedure Laterality Date     COLONOSCOPY  1/17/2014    Procedure: COMBINED COLONOSCOPY, SINGLE BIOPSY/POLYPECTOMY BY BIOPSY;  Colonoscopy;  Surgeon: Una Alcazar MD;  Location: [facility]     EXAM UNDER ANESTHESIA, FISTULOTOMY RECTUM, COMBINED N/A 2/22/2021    Procedure: EXAM UNDER ANESTHESIA, RECTUM,;  Surgeon: Anil Bales DO;  Location: [facility]     TONSILLECTOMY & ADENOIDECTOMY  about 1 1/3 yo       Family History   Problem Relation Age of Onset     Allergies Mother      Lipids Mother      Allergies Father      Gastrointestinal Disease Father         Crohn's disease     Lipids Maternal Grandmother      Cancer Maternal Grandmother      Lipids Maternal Grandfather      Cancer Maternal Grandfather      Diabetes Paternal Grandfather      Cancer Paternal Grandmother      Anxiety Disorder Sister        Social History     Tobacco Use     Smoking status: Never     Passive exposure: Never     Smokeless tobacco: Never   Substance Use Topics     Alcohol use: Yes     Comment: social        History   Drug Use No       Current Outpatient Medications   Medication Sig Dispense Refill     amoxicillin-clavulanate (AUGMENTIN) 875-125 MG tablet Take 1 tablet by mouth 2 times daily for 7 days 14 tablet 0     risankizumab-rzaa (SKYRIZI) 600 MG/10ML injection Administer Skyrizi 600mg every 4 weeks by IV route       ustekinumab (STELARA) 90 MG/ML Inject 90 mg Subcutaneous Every 8 weeks (Patient not taking: Reported on 2/16/2024)       venlafaxine (EFFEXOR XR) 37.5 MG 24 hr capsule Take 1 capsule (37.5 mg) by mouth daily for 14 days, THEN 2 capsules (75 mg) daily for 14 days. 60 capsule 1       Allergies   Allergen Reactions     Ciprofloxacin Hives     Sulfamethizole Rash       Review of  Systems:   10 point ROS negative other than what was listed in HPI physical Exam:  There were no vitals taken for this visit.    Constitutional- No acute distress, well nourished, non-toxic  Eyes: Anicteric, no injection.  PERRL  ENT:  Normocephalic, atraumatic, Nose midline, moist mucus membranes  Neck - supple, no LAD  Respiratory- Nonlabored breathing on room air  Cardiovascular - Extremities warm and well perfused.  Abdomen - Soft, non-tender, no hepatosplenomegaly, no palpable masses  Rectal -multiple perianal sinus tracts as well as induration without fluctuance, primarily on left anterior perianal skin.  Small draining area on left perianal skin without bleeding.  Digital rectal exam with good tone, no masses, guaiac negative.  No appreciable lesions or bleeding visualized on anoscopy.  Neuro - No focal neuro deficits, Alert and oriented x 3  Psych: Appropriate mood and affect  Musculoskeletal: Normal gait, symmetric strength.  FROM upper and lower extremities.  Skin: Warm, Dry    Assessment:  1. Carlyle Archibald is a 23 year old male with past medical history significant for Crohn's disease who presents for evaluation of chronic, recurrent perianal abscesses.  On exam, these appear to be noninflamed and have spontaneously started draining.  No indication for urgent incision and drainage.  He has been taking amoxicillin as prescribed by his primary care provider.    No indication for urgent surgical intervention at this time given the abscesses have spontaneously drained, however I recommend that the patient be evaluated by colorectal surgery to at least establish care in the event that he will eventually need surgical intervention for his Crohn's disease.  He was advised to follow-up with his gastroenterologist for ongoing medical management of his Crohn's disease, as well as to discuss timing of further surveillance colonoscopies.  Patient expressed understanding and is amenable to the proposed  plan.    Plan:   1. Continue amoxicillin as prescribed  2. Referral placed for colorectal surgery evaluation   3. Follow up with gastroentrologist for continued medical management of Crohn's disease        Adair Jordan MD on 2/19/2024 at 3:19 PM      Again, thank you for allowing me to participate in the care of your patient.        Sincerely,        Adair Jordan MD

## 2024-02-20 NOTE — PROGRESS NOTES
"Colon and Rectal Surgery Clinic Note    RE: Carlyle Archibald.  : 2000.  LOU: 3/15/2024.    Reason for visit: Fistulizing perianal Crohn's disease.       HPI: 23-year-old male who was diagnosed with ileocecal and perianal Crohn's disease in .  This is currently being managed with Skyrizi.  Underwent I&D of perianal abscess by Dr. Bales on 2021.  Currently presents with intermittent clear anal drainage.  He will have 2-3 episodes per year of intense swelling where a \"cyst\" ruptures on its own or he presents to the emergency department for I&D.  It seems like his Skyrizi is controlling his Crohn's disease quite well as he is having 2 bowel movements per day with no fecal urgency or incontinence.  Denies blood per rectum.  His appetite is good.  He denies weight loss.  Family history significant for Crohn's disease in his father.  MR Pelvis 2/15/2020  IMPRESSION: Small left-sided perianal abscess with fistulous  communication to the approximate 1-2:00 position of the anus.  CT A/P 21  MPRESSION:   1.  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 Overall size at approximately 2 cm is unchanged. No new abscess.  2.  Chronic recurrent mild terminal ileal inflammation.   Colonoscopy 3/2021      Colonoscopy 2021          MRE 24  IMPRESSION:  1.  Moderate bowel wall thickening and mucosal hyperenhancement  involving approximately 8 cm of terminal ileum has a similar  appearance to the CT of 2021, given differences in modality.  2.  No other small bowel abnormalities. No bowel obstruction.  - Diagnosed: 2014, at age: 13 years.    - Location (visual):    Ileocolonic (L3)                                                                        Unknown (EGD was not performed):                                         Upper GI disease proximal to ligament of Treitz (L4a)                                         Upper GI disease distal to " ligament of Treitz  and proximal to distal 1/3 ileum (L4b)     - Extent (histopath): TI, Entire Colon     - Behavior:     Non-stricturing, non-penetrating (B1)                            Perianal (p)   - Growth:       No evidence of growth delay (G0)                            - TPMT phenotype:     Normal 27.1  [id number]  - IBD7:                            Not done     - PPD status:        2/2014: pending  - Extraintestinal manifestations: None (erythema nodosum, pyoderma gangrenosum, arthritis, arthralgia, fevers, PSC, others.)  - Previous IBD-related medications and reasons for their discontinuation:    - Immunization status: Fully Immunized for age  - Varicella, measles, mumps IgG status: Unknown, Positive titers: 2/2014: pending     - Eye exam:                date and result     - Previous abdominal surgeries: dates  - Previous admissions for IBD: dates    Medical history:  Past Medical History:   Diagnosis Date    Closed fracture of unspecified part of humerus 8/2005    Broken Left Arm    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    Meningitis, unspecified(322.9)     at 2 mos old, no long term complications     Surgical history:  Past Surgical History:   Procedure Laterality Date    COLONOSCOPY  1/17/2014    Procedure: COMBINED COLONOSCOPY, SINGLE BIOPSY/POLYPECTOMY BY BIOPSY;  Colonoscopy;  Surgeon: Una Alcazar MD;  Location: [facility]    EXAM UNDER ANESTHESIA, FISTULOTOMY RECTUM, COMBINED N/A 2/22/2021    Procedure: EXAM UNDER ANESTHESIA, RECTUM,;  Surgeon: Anil Bales DO;  Location: [facility]    TONSILLECTOMY & ADENOIDECTOMY  about 1 1/1 yo     Family history:  Family History   Problem Relation Age of Onset    Allergies Mother     Lipids Mother     Allergies Father     Gastrointestinal Disease Father         Crohn's disease    Anxiety Disorder Sister     Lipids Maternal Grandmother     Cancer Maternal Grandmother         skin     Lipids Maternal Grandfather     Cancer Maternal Grandfather         skin    Cancer Paternal Grandmother         breast    Diabetes Paternal Grandfather      Medications:  Current Outpatient Medications   Medication Sig Dispense Refill    risankizumab-rzaa (SKYRIZI) 600 MG/10ML injection Administer Skyrizi 600mg every 4 weeks by IV route       Allergies:  Allergies   Allergen Reactions    Ciprofloxacin Hives    Sulfamethizole Rash     Social history:   Social History     Tobacco Use    Smoking status: Never     Passive exposure: Never    Smokeless tobacco: Never   Substance Use Topics    Alcohol use: Yes     Comment: social     Marital status: single.    Physical Examination:  /86 (BP Location: Left arm, Patient Position: Sitting, Cuff Size: Adult Regular)   Pulse 77   Temp 98.3  F (36.8  C) (Oral)   Wt 67.2 kg (148 lb 3.2 oz)   SpO2 98%   BMI 20.07 kg/m    General: Well hydrated. No acute distress.  Perianal external examination:  Perianal skin: Severe circumferential irritation with superficial fissuring.  Classical purple hue.  Lesions: Yes: Increased induration at the left anterior aspect of the perianal skin extending towards the perineum.  No clear evidence of fluctuance.  There are superficial fissures but I did not see a clear external orifice representing a fistula.  Eversion of buttocks: There was not evidence of an anal fissure. Details: N/A.  Skin tags or external hemorrhoids: Yes: Small left posterior skin tag.  Digital rectal examination: Was deferred in order not to cause further trauma.    Anoscopy: Was deferred in order not to cause further trauma    Investigations:  None.    Procedures:  None.    ASSESSMENT  23-year-old male with ileocecal and perianal fistulizing Crohn's disease.  No evidence of active perianal infection but he does have a known fistula.  Disease seems to be well-controlled on Skyrizi. Risks, benefits, and alternatives of operative treatment were thoroughly discussed  with the patient, he/she understands these well and agrees to proceed.    PLAN  1.  Schedule 3T MR pelvis (fistula protocol).    2.  Schedule EUA with possible seton placement. Will need PAC. No need to hold Skyrizi.    45 minutes spent on the date of encounter performing chart review, history and exam, documentation and further activities.     Víctor Burroughs MD, MSc, FACS, FASCRS.    Chief, Division of Colon & Rectal Surgery  Stanley M. Goldberg, MD Endowed Chair, Colon & Rectal Surgery  Department of Surgery  [facility]      Referring Provider:  Adair Jordan MD  [address] [place] [address]     Primary Care Provider:  Smiley Rivera    CC:  Anil Bales MD

## 2024-02-20 NOTE — CONFIDENTIAL NOTE
Called the patient regarding his abscess. Of note, during the weekend his abscess did spontaneously drain. Although it is still draining, his pain is now better compared to when he sent the message.  At this time he states that he is able to manage his pain with Tylenol and ibuprofen as well as warm compress.  No need to send pain medication over to his pharmacy.    Cece Chambers MD

## 2024-02-28 ENCOUNTER — TRANSFERRED RECORDS (OUTPATIENT)
Dept: HEALTH INFORMATION MANAGEMENT | Facility: CLINIC | Age: 24
End: 2024-02-28
Payer: COMMERCIAL

## 2024-02-28 ENCOUNTER — PRE VISIT (OUTPATIENT)
Dept: SURGERY | Facility: CLINIC | Age: 24
End: 2024-02-28
Payer: COMMERCIAL

## 2024-02-28 NOTE — CONFIDENTIAL NOTE
COLON AND RECTAL SURGERY PRE-VISIT:  Diagnosis, Referred by & from: Crohn's Disease. Referral per Dr. Jordan.   Appt date: 3/15/2024 with Dr. Burroughs at [facility]   NOTES STATUS DETAILS   OFFICE NOTE from referring provider [facility]:  2/19/2024- Dr. Jordan (Surgery) [Perinanal abscess]   OFFICE NOTE from other specialist Received [facility]:  2/16/2024- Dr. Chambers ([facility]) [Perineal abscesses]    [facility]:  1/17/2024- Dr. Oseguera   DISCHARGE SUMMARY from hospital N/A    DISCHARGE REPORT from the ER N/A    OPERATIVE REPORT Internal [facility]:  2/22/2021- Dr. Bales [Rectal Examination Under Anesthesia ]   MEDICATION LIST N/A    LABS N/A    DIAGNOSTIC PROCEDURES     PFC TESTING N/A    COLONOSCOPY    PATHOLOGY        PATHOLOGY Received [facility]:  3/23/2021- Dr. Chiu  In Report.    [facility]:  1/17/2014- Dr. Temple  Internal.   UPPER ENDOSCOPY (EGD)    PATHOLOGY Received [facility]:  12/22/2021- Dr. Yao  In Report.   FLEX SIGMOIDOSCOPY N/A    ERCP N/A    IMAGING (DISC & REPORT)      CT Internal [facility]:  1/13/2021- CT AP  8/28/2020- CT AP  4/21/2016- CT AP   MRI [facility]:  2/8/2024- [facility]  9/15/2020- MR Pelvis  2/19/2014- [facility]   XRAY N/A    ULTRASOUND  (ENDOANAL/ENDORECTAL) N/A      Records Requested   02/28/24 at 12:00 pm: No other records requested at this time. PRE-VISIT COMPLETE.     All relevant records are bookmarked under REGAN Dorsey.      Piotr Gonzalez, REINALDOT  Colon and Rectal Surgery

## 2024-03-15 ENCOUNTER — OFFICE VISIT (OUTPATIENT)
Dept: SURGERY | Facility: CLINIC | Age: 24
End: 2024-03-15
Payer: COMMERCIAL

## 2024-03-15 VITALS
WEIGHT: 148.2 LBS | OXYGEN SATURATION: 98 % | HEART RATE: 77 BPM | TEMPERATURE: 98.3 F | BODY MASS INDEX: 20.07 KG/M2 | SYSTOLIC BLOOD PRESSURE: 121 MMHG | DIASTOLIC BLOOD PRESSURE: 86 MMHG

## 2024-03-15 DIAGNOSIS — K60.30 FISTULA-IN-ANO: Primary | ICD-10-CM

## 2024-03-15 DIAGNOSIS — K60.30 FISTULA-IN-ANO: ICD-10-CM

## 2024-03-15 DIAGNOSIS — K50.119 CROHN'S DISEASE OF LARGE INTESTINE WITH COMPLICATION (H): Primary | ICD-10-CM

## 2024-03-15 DIAGNOSIS — K50.119 CROHN'S DISEASE OF LARGE INTESTINE WITH COMPLICATION (H): ICD-10-CM

## 2024-03-15 PROCEDURE — 99215 OFFICE O/P EST HI 40 MIN: CPT | Performed by: COLON & RECTAL SURGERY

## 2024-03-15 RX ORDER — MENTHOL AND ZINC OXIDE .44; 20.625 G/100G; G/100G
OINTMENT TOPICAL 4 TIMES DAILY PRN
Qty: 113 G | Refills: 0 | Status: SHIPPED | OUTPATIENT
Start: 2024-03-15

## 2024-03-15 ASSESSMENT — PAIN SCALES - GENERAL: PAINLEVEL: NO PAIN (0)

## 2024-03-15 NOTE — LETTER
"    3/15/2024         RE: Carlyle Archibald  [address] [facility] [address]        Dear Colleague,    Thank you for referring your patient, Carlyle Archibald, to the [facility] [place]. Please see a copy of my visit note below.    Colon and Rectal Surgery Clinic Note    RE: Carlyle Archibald.  : 2000.  LOU: 3/15/2024.    Reason for visit: Fistulizing perianal Crohn's disease.       HPI: 23-year-old male who was diagnosed with ileocecal and perianal Crohn's disease in .  This is currently being managed with Skyrizi.  Underwent I&D of perianal abscess by Dr. Bales on 2021.  Currently presents with intermittent clear anal drainage.  He will have 2-3 episodes per year of intense swelling where a \"cyst\" ruptures on its own or he presents to the emergency department for I&D.  It seems like his Skyrizi is controlling his Crohn's disease quite well as he is having 2 bowel movements per day with no fecal urgency or incontinence.  Denies blood per rectum.  His appetite is good.  He denies weight loss.  Family history significant for Crohn's disease in his father.  MR Pelvis 2/15/2020  IMPRESSION: Small left-sided perianal abscess with fistulous  communication to the approximate 1-2:00 position of the anus.  CT A/P 21  MPRESSION:   1.  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 Overall size at approximately 2 cm is unchanged. No new abscess.  2.  Chronic recurrent mild terminal ileal inflammation.   Colonoscopy 3/2021      Colonoscopy 2021          [facility] 24  IMPRESSION:  1.  Moderate bowel wall thickening and mucosal hyperenhancement  involving approximately 8 cm of terminal ileum has a similar  appearance to the CT of 2021, given differences in modality.  2.  No other small bowel abnormalities. No bowel obstruction.  - Diagnosed: 2014, at age: 13 years.    - Location (visual):    Ileocolonic (L3)                 "                                                        Unknown (EGD was not performed):                                         Upper GI disease proximal to ligament of Treitz (L4a)                                         Upper GI disease distal to ligament of Treitz  and proximal to distal 1/3 ileum (L4b)     - Extent (histopath): TI, Entire Colon     - Behavior:     Non-stricturing, non-penetrating (B1)                            Perianal (p)   - Growth:       No evidence of growth delay (G0)                            - TPMT phenotype:     Normal 27.1  [id number]  - IBD7:                            Not done     - PPD status:        2/2014: pending  - Extraintestinal manifestations: None (erythema nodosum, pyoderma gangrenosum, arthritis, arthralgia, fevers, PSC, others.)  - Previous IBD-related medications and reasons for their discontinuation:    - Immunization status: Fully Immunized for age  - Varicella, measles, mumps IgG status: Unknown, Positive titers: 2/2014: pending     - Eye exam:                date and result     - Previous abdominal surgeries: dates  - Previous admissions for IBD: dates    Medical history:  Past Medical History:   Diagnosis Date     Closed fracture of unspecified part of humerus 8/2005    Broken Left Arm     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     Meningitis, unspecified(322.9)     at 2 mos old, no long term complications     Surgical history:  Past Surgical History:   Procedure Laterality Date     COLONOSCOPY  1/17/2014    Procedure: COMBINED COLONOSCOPY, SINGLE BIOPSY/POLYPECTOMY BY BIOPSY;  Colonoscopy;  Surgeon: Una Alcazar MD;  Location: [facility]     EXAM UNDER ANESTHESIA, FISTULOTOMY RECTUM, COMBINED N/A 2/22/2021    Procedure: EXAM UNDER ANESTHESIA, RECTUM,;  Surgeon: Anil Bales DO;  Location: [facility]     TONSILLECTOMY & ADENOIDECTOMY  about 1 1/1 yo     Family history:  Family History    Problem Relation Age of Onset     Allergies Mother      Lipids Mother      Allergies Father      Gastrointestinal Disease Father         Crohn's disease     Anxiety Disorder Sister      Lipids Maternal Grandmother      Cancer Maternal Grandmother         skin     Lipids Maternal Grandfather      Cancer Maternal Grandfather         skin     Cancer Paternal Grandmother         breast     Diabetes Paternal Grandfather      Medications:  Current Outpatient Medications   Medication Sig Dispense Refill     risankizumab-rzaa (SKYRIZI) 600 MG/10ML injection Administer Skyrizi 600mg every 4 weeks by IV route       Allergies:  Allergies   Allergen Reactions     Ciprofloxacin Hives     Sulfamethizole Rash     Social history:   Social History     Tobacco Use     Smoking status: Never     Passive exposure: Never     Smokeless tobacco: Never   Substance Use Topics     Alcohol use: Yes     Comment: social     Marital status: single.    Physical Examination:  /86 (BP Location: Left arm, Patient Position: Sitting, Cuff Size: Adult Regular)   Pulse 77   Temp 98.3  F (36.8  C) (Oral)   Wt 67.2 kg (148 lb 3.2 oz)   SpO2 98%   BMI 20.07 kg/m    General: Well hydrated. No acute distress.  Perianal external examination:  Perianal skin: Severe circumferential irritation with superficial fissuring.  Classical purple hue.  Lesions: Yes: Increased induration at the left anterior aspect of the perianal skin extending towards the perineum.  No clear evidence of fluctuance.  There are superficial fissures but I did not see a clear external orifice representing a fistula.  Eversion of buttocks: There was not evidence of an anal fissure. Details: N/A.  Skin tags or external hemorrhoids: Yes: Small left posterior skin tag.  Digital rectal examination: Was deferred in order not to cause further trauma.    Anoscopy: Was deferred in order not to cause further  trauma    Investigations:  None.    Procedures:  None.    ASSESSMENT  23-year-old male with ileocecal and perianal fistulizing Crohn's disease.  No evidence of active perianal infection but he does have a known fistula.  Disease seems to be well-controlled on Skyrizi. Risks, benefits, and alternatives of operative treatment were thoroughly discussed with the patient, he/she understands these well and agrees to proceed.    PLAN  1.  Schedule 3T MR pelvis (fistula protocol).    2.  Schedule EUA with possible seton placement. Will need PAC. No need to hold Skyrizi.    45 minutes spent on the date of encounter performing chart review, history and exam, documentation and further activities.     Víctor Burroughs MD, MSc, FACS, FASCRS.    Chief, Division of Colon & Rectal Surgery  Stanley M. Goldberg, MD Endowed Chair, Colon & Rectal Surgery  Department of Surgery  [facility]      Referring Provider:  Adair Jordan MD  [address] [place] [address]     Primary Care Provider:  Smiley Rivera    CC:  Anil Bales MD      Again, thank you for allowing me to participate in the care of your patient.        Sincerely,        Víctor Burroughs MD

## 2024-03-15 NOTE — NURSING NOTE
Chief Complaint   Patient presents with    New Patient     Crohn's disease of large intestine with complication       Vitals:    03/15/24 1240   BP: 121/86   BP Location: Left arm   Patient Position: Sitting   Cuff Size: Adult Regular   Pulse: 77   Temp: 98.3  F (36.8  C)   TempSrc: Oral   SpO2: 98%   Weight: 67.2 kg (148 lb 3.2 oz)       Body mass index is 20.07 kg/m .      Be Nova MA

## 2024-03-15 NOTE — PATIENT INSTRUCTIONS
Follow up:  Schedule MRI Rectum- fistula [phone]  Schedule examination under anesthesia with seton placement  [phone] Bet[facility] surgery scheduling      Please call with any questions or concerns regarding your clinic visit today.    It is a pleasure being involved in your health care.    Contacts post-consultation depending on your need:    Radiology Appointments [phone]    Schedule Clinic Appointments [phone] # 1   M-F 7:30 - 5 pm    [facility] [phone]    Clinic Fax Number [phone]    Surgery Scheduling [phone]    My Chart is available 24 hours a day and is a secure way to access your records and communicate with your care team.  I strongly recommend signing up if you haven't already done so, if you are comfortable with computers.  If you would like to inquire about this or are having problems with My Chart access, you may call [phone] or go online at [email or web address].  Please allow at least 24 hours for a response and extra time on weekends and Holidays.

## 2024-03-25 ENCOUNTER — TELEPHONE (OUTPATIENT)
Dept: SURGERY | Facility: CLINIC | Age: 24
End: 2024-03-25
Payer: COMMERCIAL

## 2024-03-25 NOTE — TELEPHONE ENCOUNTER
Scheduling outpatient surgery with Dr. Burroughs on a date after MRI on 4/15/2024:    M for patient to call back regarding scheduling.     Betzy Andrews  Ruthy-op Coordinator  [facility]  Direct Phone: [phone]

## 2024-03-28 ENCOUNTER — TELEPHONE (OUTPATIENT)
Dept: PSYCHOLOGY | Facility: CLINIC | Age: 24
End: 2024-03-28
Payer: COMMERCIAL

## 2024-03-28 NOTE — TELEPHONE ENCOUNTER
Patient was a no call no show to the scheduled 4pm appointment with this provider today. Provider called the patient at 4:04pm, but they did not answer. At the time of this writing, provider has not heard back from the patient.

## 2024-03-29 ENCOUNTER — TRANSFERRED RECORDS (OUTPATIENT)
Dept: HEALTH INFORMATION MANAGEMENT | Facility: CLINIC | Age: 24
End: 2024-03-29
Payer: COMMERCIAL

## 2024-04-15 ENCOUNTER — HOSPITAL ENCOUNTER (OUTPATIENT)
Dept: MRI IMAGING | Facility: CLINIC | Age: 24
Discharge: HOME | End: 2024-04-15
Attending: COLON & RECTAL SURGERY | Admitting: COLON & RECTAL SURGERY
Payer: COMMERCIAL

## 2024-04-15 DIAGNOSIS — K60.30 FISTULA-IN-ANO: ICD-10-CM

## 2024-04-15 DIAGNOSIS — K50.119 CROHN'S DISEASE OF LARGE INTESTINE WITH COMPLICATION (H): ICD-10-CM

## 2024-04-15 PROCEDURE — A9585 GADOBUTROL INJECTION: HCPCS | Performed by: COLON & RECTAL SURGERY

## 2024-04-15 PROCEDURE — 72197 MRI PELVIS W/O & W/DYE: CPT

## 2024-04-15 PROCEDURE — 255N000002 HC RX 255 OP 636: Performed by: COLON & RECTAL SURGERY

## 2024-04-15 PROCEDURE — 72197 MRI PELVIS W/O & W/DYE: CPT | Mod: 26 | Performed by: RADIOLOGY

## 2024-04-15 RX ORDER — GADOBUTROL 604.72 MG/ML
7.5 INJECTION INTRAVENOUS ONCE
Status: COMPLETED | OUTPATIENT
Start: 2024-04-15 | End: 2024-04-15

## 2024-04-15 RX ADMIN — GADOBUTROL 7.5 ML: 604.72 INJECTION INTRAVENOUS at 11:58

## 2024-04-22 ENCOUNTER — DOCUMENTATION ONLY (OUTPATIENT)
Dept: BEHAVIORAL HEALTH | Facility: CLINIC | Age: 24
End: 2024-04-22
Payer: COMMERCIAL

## 2024-04-29 ENCOUNTER — TRANSFERRED RECORDS (OUTPATIENT)
Dept: HEALTH INFORMATION MANAGEMENT | Facility: CLINIC | Age: 24
End: 2024-04-29

## 2024-05-16 ENCOUNTER — OFFICE VISIT (OUTPATIENT)
Dept: FAMILY MEDICINE | Facility: CLINIC | Age: 24
End: 2024-05-16
Payer: COMMERCIAL

## 2024-05-16 VITALS
SYSTOLIC BLOOD PRESSURE: 122 MMHG | DIASTOLIC BLOOD PRESSURE: 92 MMHG | RESPIRATION RATE: 20 BRPM | OXYGEN SATURATION: 99 % | HEART RATE: 72 BPM | WEIGHT: 146.9 LBS | BODY MASS INDEX: 19.9 KG/M2 | TEMPERATURE: 97.3 F | HEIGHT: 72 IN

## 2024-05-16 DIAGNOSIS — K60.30 FISTULA-IN-ANO: ICD-10-CM

## 2024-05-16 DIAGNOSIS — K50.119 CROHN'S DISEASE OF LARGE INTESTINE WITH COMPLICATION (H): ICD-10-CM

## 2024-05-16 DIAGNOSIS — Z01.818 PREOP GENERAL PHYSICAL EXAM: Primary | ICD-10-CM

## 2024-05-16 PROCEDURE — 99213 OFFICE O/P EST LOW 20 MIN: CPT | Performed by: NURSE PRACTITIONER

## 2024-05-16 PROCEDURE — G2211 COMPLEX E/M VISIT ADD ON: HCPCS | Performed by: NURSE PRACTITIONER

## 2024-05-16 ASSESSMENT — PATIENT HEALTH QUESTIONNAIRE - PHQ9
SUM OF ALL RESPONSES TO PHQ QUESTIONS 1-9: 9
10. IF YOU CHECKED OFF ANY PROBLEMS, HOW DIFFICULT HAVE THESE PROBLEMS MADE IT FOR YOU TO DO YOUR WORK, TAKE CARE OF THINGS AT HOME, OR GET ALONG WITH OTHER PEOPLE: SOMEWHAT DIFFICULT
SUM OF ALL RESPONSES TO PHQ QUESTIONS 1-9: 9

## 2024-05-16 ASSESSMENT — PAIN SCALES - GENERAL: PAINLEVEL: NO PAIN (0)

## 2024-05-16 NOTE — PROGRESS NOTES
Preoperative Evaluation  [facility]  [address] [facility] [address]  Phone: [phone]  Primary Provider: Smiley Rivera DNP  Pre-op Performing Provider: DAVID Fisher CNP  May 16, 2024             5/16/2024   Surgical Information   What procedure is being done? Seton Implant   Facility or Hospital where procedure/surgery will be performed: [facility]    Who is doing the procedure / surgery? Dr Karina Barraza   Date of surgery / procedure: 5/17/24   Time of surgery / procedure: 3:15   Where do you plan to recover after surgery? at home with family     Fax number for surgical facility: [phone]    Assessment & Plan     The proposed surgical procedure is considered LOW risk.    Preop general physical exam  METS > 4, recent labs unremarkable.    Crohn's disease of large intestine with complication (H)  Managed with Skyrizi. Last dose 3 weeks ago, next dose not for 3 weeks. No need to hold Skyrizi per surgeon's notes.    Fistula-in-ano  Planned procedure to address.        - No identified additional risk factors other than previously addressed    Antiplatelet or Anticoagulation Medication Instructions   - Patient is on no antiplatelet or anticoagulation medications.    Additional Medication Instructions  Take all scheduled medications on the day of surgery    Recommendation  Approval given to proceed with proposed procedure, without further diagnostic evaluation.        Adalberto Barahona is a 23 year old, presenting for the following:  Pre-Op Exam (Pre op for surgery 5/17/24 [facility] )        HPI related to upcoming procedure: PMH significant for Crohn's disease managed on Skyrizi, has a known perianal fistula, planned procedure to address.        5/16/2024   Pre-Op Questionnaire   Have you ever had a heart attack or stroke? No   Have you ever had surgery on your heart or blood vessels, such as a stent placement, a  coronary artery bypass, or surgery on an artery in your head, neck, heart, or legs? No   Do you have chest pain with activity? No   Do you have a history of heart failure? No   Do you currently have a cold, bronchitis or symptoms of other infection? No   Do you have a cough, shortness of breath, or wheezing? No   Do you or anyone in your family have previous history of blood clots? No   Do you or does anyone in your family have a serious bleeding problem such as prolonged bleeding following surgeries or cuts? No   Have you ever had problems with anemia or been told to take iron pills? No   Have you had any abnormal blood loss such as black, tarry or bloody stools? No   Have you ever had a blood transfusion? No   Are you willing to have a blood transfusion if it is medically needed before, during, or after your surgery? Yes   Have you or any of your relatives ever had problems with anesthesia? No   Do you have sleep apnea, excessive snoring or daytime drowsiness? No   Do you have any artifical heart valves or other implanted medical devices like a pacemaker, defibrillator, or continuous glucose monitor? No   Do you have artificial joints? No   Are you allergic to latex? No     Health Care Directive  Patient does not have a Health Care Directive or Living Will:     Preoperative Review of    reviewed - no record of controlled substances prescribed.      Status of Chronic Conditions:  See problem list for active medical problems.  Problems all longstanding and stable, except as noted/documented.  See ROS for pertinent symptoms related to these conditions.    Patient Active Problem List    Diagnosis Date Noted    Crohn's disease of large intestine with complication (H) 06/20/2023     Priority: Medium    Fistula-in-ano 02/08/2021     Priority: Medium     Added automatically from request for surgery [id number]      Anxiety 03/27/2017     Priority: Medium    Mild major depression (H24) 11/08/2016     Priority: Medium     Perianal abscess 02/04/2014     Priority: Medium    GERD (gastroesophageal reflux disease) 01/07/2014     Priority: Medium    Osgood-Schlatter's disease 09/10/2012     Priority: Medium     Left knee      Personal history of disease 09/08/2006     Priority: Medium     Problem list name updated by automated process. Provider to review        Past Medical History:   Diagnosis Date    Closed fracture of unspecified part of humerus 8/2005    Broken Left Arm    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    Meningitis, unspecified(322.9)     at 2 mos old, no long term complications     Past Surgical History:   Procedure Laterality Date    COLONOSCOPY  1/17/2014    Procedure: COMBINED COLONOSCOPY, SINGLE BIOPSY/POLYPECTOMY BY BIOPSY;  Colonoscopy;  Surgeon: Una Alcazar MD;  Location: [facility]    EXAM UNDER ANESTHESIA, FISTULOTOMY RECTUM, COMBINED N/A 2/22/2021    Procedure: EXAM UNDER ANESTHESIA, RECTUM,;  Surgeon: Anil Bales DO;  Location: [facility]    TONSILLECTOMY & ADENOIDECTOMY  about 1 1/1 yo     Current Outpatient Medications   Medication Sig Dispense Refill    menthol-zinc oxide (CALMOSEPTINE) 0.44-20.625 % OINT ointment Apply topically 4 times daily as needed for skin protection 113 g 0    risankizumab-rzaa (SKYRIZI) 600 MG/10ML injection Administer Skyrizi 600mg every 4 weeks by IV route         Allergies   Allergen Reactions    Ciprofloxacin Hives    Sulfamethizole Rash        Social History     Tobacco Use    Smoking status: Never     Passive exposure: Never    Smokeless tobacco: Never   Substance Use Topics    Alcohol use: Yes     Comment: social     Family History   Problem Relation Age of Onset    Allergies Mother     Lipids Mother     Allergies Father     Gastrointestinal Disease Father         Crohn's disease    Anxiety Disorder Sister     Lipids Maternal Grandmother     Cancer Maternal Grandmother         skin    Lipids  "Maternal Grandfather     Cancer Maternal Grandfather         skin    Cancer Paternal Grandmother         breast    Diabetes Paternal Grandfather      History   Drug Use No             Review of Systems  Constitutional, neuro, ENT, endocrine, pulmonary, cardiac, gastrointestinal, genitourinary, musculoskeletal, integument and psychiatric systems are negative, except as otherwise noted.    Objective    BP (!) 122/92 (BP Location: Left arm, Patient Position: Sitting, Cuff Size: Adult Regular)   Pulse 72   Temp 97.3  F (36.3  C) (Tympanic)   Resp 20   Ht 1.829 m (6')   Wt 66.6 kg (146 lb 14.4 oz)   SpO2 99%   BMI 19.92 kg/m     Estimated body mass index is 19.92 kg/m  as calculated from the following:    Height as of this encounter: 1.829 m (6').    Weight as of this encounter: 66.6 kg (146 lb 14.4 oz).  Physical Exam  GENERAL: alert and no distress  EYES: Eyes grossly normal to inspection, PERRL and conjunctivae and sclerae normal  HENT: ear canals and TM's normal, nose and mouth without ulcers or lesions  NECK: no adenopathy, no asymmetry, masses, or scars  RESP: lungs clear to auscultation - no rales, rhonchi or wheezes  CV: regular rate and rhythm, normal S1 S2, no S3 or S4, no murmur, click or rub, no peripheral edema  ABDOMEN: soft, nontender, no hepatosplenomegaly, no masses and bowel sounds normal  MS: no gross musculoskeletal defects noted, no edema  SKIN: no suspicious lesions or rashes  NEURO: Normal strength and tone, mentation intact and speech normal  PSYCH: mentation appears normal, affect normal/bright    No results for input(s): \"HGB\", \"PLT\", \"INR\", \"NA\", \"POTASSIUM\", \"CR\", \"A1C\" in the last 8760 hours.     Diagnostics  No labs were ordered during this visit.   No EKG required, no history of coronary heart disease, significant arrhythmia, peripheral arterial disease or other structural heart disease.    Revised Cardiac Risk Index (RCRI)  The patient has the following serious cardiovascular risks " for perioperative complications:   - No serious cardiac risks = 0 points     RCRI Interpretation: 0 points: Class I (very low risk - 0.4% complication rate)         Signed Electronically by: DAVID Fisher CNP  Copy of this evaluation report is provided to requesting physician.

## 2024-05-17 ENCOUNTER — TRANSFERRED RECORDS (OUTPATIENT)
Dept: HEALTH INFORMATION MANAGEMENT | Facility: CLINIC | Age: 24
End: 2024-05-17
Payer: COMMERCIAL

## 2024-06-22 ENCOUNTER — HEALTH MAINTENANCE LETTER (OUTPATIENT)
Age: 24
End: 2024-06-22

## 2024-10-22 ASSESSMENT — PATIENT HEALTH QUESTIONNAIRE - PHQ9: SUM OF ALL RESPONSES TO PHQ QUESTIONS 1-9: 12

## 2025-07-12 ENCOUNTER — HEALTH MAINTENANCE LETTER (OUTPATIENT)
Age: 25
End: 2025-07-12

## (undated) DEVICE — DECANTER VIAL 2006S

## (undated) DEVICE — SYR 20ML LL W/O NDL

## (undated) DEVICE — DRSG GAUZE 4X4" 3033

## (undated) DEVICE — SUCTION TIP YANKAUER STR K87

## (undated) DEVICE — LUBRICATING JELLY 4.25OZ

## (undated) DEVICE — TUBING SUCTION 12"X1/4" N612

## (undated) DEVICE — GLOVE PROTEXIS W/NEU-THERA 7.5  2D73TE75

## (undated) DEVICE — GLOVE PROTEXIS BLUE W/NEU-THERA 8.0  2D73EB80

## (undated) DEVICE — PACK LAPAROSCOPY/PELVISCOPY STD

## (undated) DEVICE — SU CHROMIC 4-0 RB-1 27" U203H

## (undated) DEVICE — NDL 22GA 1.5"

## (undated) DEVICE — BLADE KNIFE SURG 15 371115

## (undated) DEVICE — GOWN XLG DISP 9545

## (undated) RX ORDER — MAGNESIUM SULFATE HEPTAHYDRATE 40 MG/ML
INJECTION, SOLUTION INTRAVENOUS
Status: DISPENSED
Start: 2021-02-22

## (undated) RX ORDER — CEFAZOLIN SODIUM 2 G/100ML
INJECTION, SOLUTION INTRAVENOUS
Status: DISPENSED
Start: 2021-02-22

## (undated) RX ORDER — DEXAMETHASONE SODIUM PHOSPHATE 4 MG/ML
INJECTION, SOLUTION INTRA-ARTICULAR; INTRALESIONAL; INTRAMUSCULAR; INTRAVENOUS; SOFT TISSUE
Status: DISPENSED
Start: 2021-02-22

## (undated) RX ORDER — BUPIVACAINE HYDROCHLORIDE 5 MG/ML
INJECTION, SOLUTION PERINEURAL
Status: DISPENSED
Start: 2021-02-22

## (undated) RX ORDER — GABAPENTIN 300 MG/1
CAPSULE ORAL
Status: DISPENSED
Start: 2021-02-22

## (undated) RX ORDER — LIDOCAINE HYDROCHLORIDE 10 MG/ML
INJECTION, SOLUTION EPIDURAL; INFILTRATION; INTRACAUDAL; PERINEURAL
Status: DISPENSED
Start: 2021-02-22

## (undated) RX ORDER — CELECOXIB 200 MG/1
CAPSULE ORAL
Status: DISPENSED
Start: 2021-02-22

## (undated) RX ORDER — LIDOCAINE HYDROCHLORIDE AND EPINEPHRINE 10; 10 MG/ML; UG/ML
INJECTION, SOLUTION INFILTRATION; PERINEURAL
Status: DISPENSED
Start: 2021-02-22

## (undated) RX ORDER — FENTANYL CITRATE 50 UG/ML
INJECTION, SOLUTION INTRAMUSCULAR; INTRAVENOUS
Status: DISPENSED
Start: 2021-02-22

## (undated) RX ORDER — ACETAMINOPHEN 325 MG/1
TABLET ORAL
Status: DISPENSED
Start: 2021-02-22

## (undated) RX ORDER — ONDANSETRON 2 MG/ML
INJECTION INTRAMUSCULAR; INTRAVENOUS
Status: DISPENSED
Start: 2021-02-22

## (undated) RX ORDER — PROPOFOL 10 MG/ML
INJECTION, EMULSION INTRAVENOUS
Status: DISPENSED
Start: 2021-02-22